# Patient Record
Sex: MALE | Race: OTHER | Employment: OTHER | URBAN - METROPOLITAN AREA
[De-identification: names, ages, dates, MRNs, and addresses within clinical notes are randomized per-mention and may not be internally consistent; named-entity substitution may affect disease eponyms.]

---

## 2017-06-12 ENCOUNTER — HOSPITAL ENCOUNTER (OUTPATIENT)
Age: 67
Discharge: HOME | End: 2017-06-12
Attending: PHYSICIAN ASSISTANT
Payer: MEDICARE

## 2017-06-12 DIAGNOSIS — M75.51 BURSITIS OF SHOULDER, RIGHT: ICD-10-CM

## 2017-06-12 PROCEDURE — 73221 MRI JOINT UPR EXTREM W/O DYE: CPT

## 2018-01-26 ENCOUNTER — HOSPITAL ENCOUNTER (OUTPATIENT)
Dept: PREADMISSION TESTING | Age: 68
Discharge: HOME | End: 2018-01-26
Payer: MEDICARE

## 2018-01-26 ENCOUNTER — HOSPITAL ENCOUNTER (OUTPATIENT)
Dept: GENERAL RADIOLOGY | Age: 68
Discharge: HOME | End: 2018-01-26
Payer: MEDICARE

## 2018-01-26 DIAGNOSIS — Z01.818 PRE-OP TESTING: ICD-10-CM

## 2018-01-26 DIAGNOSIS — Z01.811 PRE-OP CHEST EXAM: ICD-10-CM

## 2018-01-26 DIAGNOSIS — Z01.818 PRE-OP EXAMINATION: ICD-10-CM

## 2018-01-26 LAB
ABO + RH BLD: NORMAL
ALBUMIN SERPL-MCNC: 4.2 G/DL (ref 3.4–5)
ALBUMIN/GLOB SERPL: 1.4 {RATIO} (ref 0.8–1.7)
ALP SERPL-CCNC: 60 U/L (ref 45–117)
ALT SERPL-CCNC: 27 U/L (ref 16–61)
ANION GAP SERPL CALC-SCNC: 7 MMOL/L (ref 3–18)
APPEARANCE UR: CLEAR
APTT PPP: 26.3 SEC (ref 23–36.4)
AST SERPL-CCNC: 17 U/L (ref 15–37)
BASOPHILS # BLD: 0 K/UL (ref 0–0.1)
BASOPHILS NFR BLD: 1 % (ref 0–2)
BILIRUB SERPL-MCNC: 0.5 MG/DL (ref 0.2–1)
BILIRUB UR QL: NEGATIVE
BLOOD GROUP ANTIBODIES SERPL: NORMAL
BUN SERPL-MCNC: 19 MG/DL (ref 7–18)
BUN/CREAT SERPL: 22 (ref 12–20)
CALCIUM SERPL-MCNC: 9 MG/DL (ref 8.5–10.1)
CHLORIDE SERPL-SCNC: 102 MMOL/L (ref 100–108)
CO2 SERPL-SCNC: 29 MMOL/L (ref 21–32)
COLOR UR: YELLOW
CREAT SERPL-MCNC: 0.88 MG/DL (ref 0.6–1.3)
DIFFERENTIAL METHOD BLD: ABNORMAL
EOSINOPHIL # BLD: 0.1 K/UL (ref 0–0.4)
EOSINOPHIL NFR BLD: 2 % (ref 0–5)
ERYTHROCYTE [DISTWIDTH] IN BLOOD BY AUTOMATED COUNT: 13.2 % (ref 11.6–14.5)
GLOBULIN SER CALC-MCNC: 3 G/DL (ref 2–4)
GLUCOSE SERPL-MCNC: 94 MG/DL (ref 74–99)
GLUCOSE UR STRIP.AUTO-MCNC: NEGATIVE MG/DL
HCT VFR BLD AUTO: 40.6 % (ref 36–48)
HGB BLD-MCNC: 13.7 G/DL (ref 13–16)
HGB UR QL STRIP: NEGATIVE
INR PPP: 0.9 (ref 0.8–1.2)
KETONES UR QL STRIP.AUTO: NEGATIVE MG/DL
LEUKOCYTE ESTERASE UR QL STRIP.AUTO: NEGATIVE
LYMPHOCYTES # BLD: 1.1 K/UL (ref 0.9–3.6)
LYMPHOCYTES NFR BLD: 27 % (ref 21–52)
MCH RBC QN AUTO: 29.8 PG (ref 24–34)
MCHC RBC AUTO-ENTMCNC: 33.7 G/DL (ref 31–37)
MCV RBC AUTO: 88.5 FL (ref 74–97)
MONOCYTES # BLD: 0.3 K/UL (ref 0.05–1.2)
MONOCYTES NFR BLD: 8 % (ref 3–10)
NEUTS SEG # BLD: 2.6 K/UL (ref 1.8–8)
NEUTS SEG NFR BLD: 62 % (ref 40–73)
NITRITE UR QL STRIP.AUTO: NEGATIVE
PH UR STRIP: 7.5 [PH] (ref 5–8)
PLATELET # BLD AUTO: 178 K/UL (ref 135–420)
PMV BLD AUTO: 9.4 FL (ref 9.2–11.8)
POTASSIUM SERPL-SCNC: 4.3 MMOL/L (ref 3.5–5.5)
PROT SERPL-MCNC: 7.2 G/DL (ref 6.4–8.2)
PROT UR STRIP-MCNC: NEGATIVE MG/DL
PROTHROMBIN TIME: 12 SEC (ref 11.5–15.2)
RBC # BLD AUTO: 4.59 M/UL (ref 4.7–5.5)
SODIUM SERPL-SCNC: 138 MMOL/L (ref 136–145)
SP GR UR REFRACTOMETRY: 1.02 (ref 1–1.03)
SPECIMEN EXP DATE BLD: NORMAL
UROBILINOGEN UR QL STRIP.AUTO: 0.2 EU/DL (ref 0.2–1)
WBC # BLD AUTO: 4.2 K/UL (ref 4.6–13.2)

## 2018-01-26 PROCEDURE — 36415 COLL VENOUS BLD VENIPUNCTURE: CPT | Performed by: ORTHOPAEDIC SURGERY

## 2018-01-26 PROCEDURE — 93005 ELECTROCARDIOGRAM TRACING: CPT

## 2018-01-26 PROCEDURE — 87086 URINE CULTURE/COLONY COUNT: CPT | Performed by: ORTHOPAEDIC SURGERY

## 2018-01-26 PROCEDURE — 85610 PROTHROMBIN TIME: CPT | Performed by: ORTHOPAEDIC SURGERY

## 2018-01-26 PROCEDURE — 80053 COMPREHEN METABOLIC PANEL: CPT | Performed by: ORTHOPAEDIC SURGERY

## 2018-01-26 PROCEDURE — 85025 COMPLETE CBC W/AUTO DIFF WBC: CPT | Performed by: ORTHOPAEDIC SURGERY

## 2018-01-26 PROCEDURE — 86900 BLOOD TYPING SEROLOGIC ABO: CPT | Performed by: ORTHOPAEDIC SURGERY

## 2018-01-26 PROCEDURE — 81003 URINALYSIS AUTO W/O SCOPE: CPT | Performed by: ORTHOPAEDIC SURGERY

## 2018-01-26 PROCEDURE — 85730 THROMBOPLASTIN TIME PARTIAL: CPT | Performed by: ORTHOPAEDIC SURGERY

## 2018-01-26 PROCEDURE — 71046 X-RAY EXAM CHEST 2 VIEWS: CPT

## 2018-01-27 LAB
ATRIAL RATE: 63 BPM
BACTERIA SPEC CULT: NORMAL
CALCULATED P AXIS, ECG09: 70 DEGREES
CALCULATED R AXIS, ECG10: 21 DEGREES
CALCULATED T AXIS, ECG11: 23 DEGREES
DIAGNOSIS, 93000: NORMAL
P-R INTERVAL, ECG05: 138 MS
Q-T INTERVAL, ECG07: 382 MS
QRS DURATION, ECG06: 90 MS
QTC CALCULATION (BEZET), ECG08: 390 MS
SERVICE CMNT-IMP: NORMAL
VENTRICULAR RATE, ECG03: 63 BPM

## 2018-02-07 ENCOUNTER — ANESTHESIA EVENT (OUTPATIENT)
Dept: SURGERY | Age: 68
DRG: 483 | End: 2018-02-07
Payer: MEDICARE

## 2018-02-08 ENCOUNTER — HOSPITAL ENCOUNTER (INPATIENT)
Age: 68
LOS: 1 days | Discharge: HOME | DRG: 483 | End: 2018-02-09
Attending: ORTHOPAEDIC SURGERY | Admitting: ORTHOPAEDIC SURGERY
Payer: MEDICARE

## 2018-02-08 ENCOUNTER — ANESTHESIA (OUTPATIENT)
Dept: SURGERY | Age: 68
DRG: 483 | End: 2018-02-08
Payer: MEDICARE

## 2018-02-08 ENCOUNTER — APPOINTMENT (OUTPATIENT)
Dept: GENERAL RADIOLOGY | Age: 68
DRG: 483 | End: 2018-02-08
Attending: ORTHOPAEDIC SURGERY
Payer: MEDICARE

## 2018-02-08 DIAGNOSIS — M19.011 PRIMARY OSTEOARTHRITIS OF RIGHT SHOULDER: Primary | ICD-10-CM

## 2018-02-08 PROCEDURE — 74011000250 HC RX REV CODE- 250: Performed by: NURSE ANESTHETIST, CERTIFIED REGISTERED

## 2018-02-08 PROCEDURE — 77030021688 HC BIT DRL QC J&J -B: Performed by: ORTHOPAEDIC SURGERY

## 2018-02-08 PROCEDURE — 74011000258 HC RX REV CODE- 258

## 2018-02-08 PROCEDURE — 74011250636 HC RX REV CODE- 250/636: Performed by: NURSE ANESTHETIST, CERTIFIED REGISTERED

## 2018-02-08 PROCEDURE — 74011250636 HC RX REV CODE- 250/636: Performed by: ORTHOPAEDIC SURGERY

## 2018-02-08 PROCEDURE — 65270000029 HC RM PRIVATE

## 2018-02-08 PROCEDURE — C9290 INJ, BUPIVACAINE LIPOSOME: HCPCS | Performed by: ORTHOPAEDIC SURGERY

## 2018-02-08 PROCEDURE — 74011250637 HC RX REV CODE- 250/637: Performed by: ORTHOPAEDIC SURGERY

## 2018-02-08 PROCEDURE — 74011250636 HC RX REV CODE- 250/636

## 2018-02-08 PROCEDURE — 0RRJ0JZ REPLACEMENT OF RIGHT SHOULDER JOINT WITH SYNTHETIC SUBSTITUTE, OPEN APPROACH: ICD-10-PCS | Performed by: ORTHOPAEDIC SURGERY

## 2018-02-08 PROCEDURE — 77030018836 HC SOL IRR NACL ICUM -A: Performed by: ORTHOPAEDIC SURGERY

## 2018-02-08 PROCEDURE — 77030012547: Performed by: ORTHOPAEDIC SURGERY

## 2018-02-08 PROCEDURE — 74011250636 HC RX REV CODE- 250/636: Performed by: ANESTHESIOLOGY

## 2018-02-08 PROCEDURE — 77030020782 HC GWN BAIR PAWS FLX 3M -B: Performed by: ORTHOPAEDIC SURGERY

## 2018-02-08 PROCEDURE — 77030006576 HC BIT DRL QC J&J -C: Performed by: ORTHOPAEDIC SURGERY

## 2018-02-08 PROCEDURE — 77030027792 HC SMRTMX MIN KT J&J -B: Performed by: ORTHOPAEDIC SURGERY

## 2018-02-08 PROCEDURE — 3E0T3BZ INTRODUCTION OF ANESTHETIC AGENT INTO PERIPHERAL NERVES AND PLEXI, PERCUTANEOUS APPROACH: ICD-10-PCS | Performed by: ANESTHESIOLOGY

## 2018-02-08 PROCEDURE — 64415 NJX AA&/STRD BRCH PLXS IMG: CPT | Performed by: ANESTHESIOLOGY

## 2018-02-08 PROCEDURE — 77030019605: Performed by: ORTHOPAEDIC SURGERY

## 2018-02-08 PROCEDURE — 77030003029 HC SUT VCRL J&J -B: Performed by: ORTHOPAEDIC SURGERY

## 2018-02-08 PROCEDURE — 74011000258 HC RX REV CODE- 258: Performed by: ORTHOPAEDIC SURGERY

## 2018-02-08 PROCEDURE — 77030031410 HC IMMOB SHLDR SLNG SUP BREG -B: Performed by: ORTHOPAEDIC SURGERY

## 2018-02-08 PROCEDURE — 77030032490 HC SLV COMPR SCD KNE COVD -B: Performed by: ORTHOPAEDIC SURGERY

## 2018-02-08 PROCEDURE — 76210000006 HC OR PH I REC 0.5 TO 1 HR: Performed by: ORTHOPAEDIC SURGERY

## 2018-02-08 PROCEDURE — 74011000250 HC RX REV CODE- 250: Performed by: ORTHOPAEDIC SURGERY

## 2018-02-08 PROCEDURE — C1776 JOINT DEVICE (IMPLANTABLE): HCPCS | Performed by: ORTHOPAEDIC SURGERY

## 2018-02-08 PROCEDURE — 77030002966 HC SUT PDS J&J -A: Performed by: ORTHOPAEDIC SURGERY

## 2018-02-08 PROCEDURE — 77030002996 HC SUT SLK J&J -A: Performed by: ORTHOPAEDIC SURGERY

## 2018-02-08 PROCEDURE — 77030011640 HC PAD GRND REM COVD -A: Performed by: ORTHOPAEDIC SURGERY

## 2018-02-08 PROCEDURE — 77030002933 HC SUT MCRYL J&J -A: Performed by: ORTHOPAEDIC SURGERY

## 2018-02-08 PROCEDURE — 74011250637 HC RX REV CODE- 250/637: Performed by: NURSE ANESTHETIST, CERTIFIED REGISTERED

## 2018-02-08 PROCEDURE — 74011000250 HC RX REV CODE- 250

## 2018-02-08 PROCEDURE — 97161 PT EVAL LOW COMPLEX 20 MIN: CPT

## 2018-02-08 PROCEDURE — 77030003666 HC NDL SPINAL BD -A: Performed by: ORTHOPAEDIC SURGERY

## 2018-02-08 PROCEDURE — 76060000038 HC ANESTHESIA 3.5 TO 4 HR: Performed by: ORTHOPAEDIC SURGERY

## 2018-02-08 PROCEDURE — 76942 ECHO GUIDE FOR BIOPSY: CPT | Performed by: ANESTHESIOLOGY

## 2018-02-08 PROCEDURE — 77030013708 HC HNDPC SUC IRR PULS STRY –B: Performed by: ORTHOPAEDIC SURGERY

## 2018-02-08 PROCEDURE — 77030027138 HC INCENT SPIROMETER -A

## 2018-02-08 PROCEDURE — 76010000174 HC OR TIME 3.5 TO 4 HR INTENSV-TIER 1: Performed by: ORTHOPAEDIC SURGERY

## 2018-02-08 PROCEDURE — 73030 X-RAY EXAM OF SHOULDER: CPT

## 2018-02-08 PROCEDURE — 77030031139 HC SUT VCRL2 J&J -A: Performed by: ORTHOPAEDIC SURGERY

## 2018-02-08 PROCEDURE — 77030002922 HC SUT FBRWRE ARTH -B: Performed by: ORTHOPAEDIC SURGERY

## 2018-02-08 PROCEDURE — C1713 ANCHOR/SCREW BN/BN,TIS/BN: HCPCS | Performed by: ORTHOPAEDIC SURGERY

## 2018-02-08 PROCEDURE — 99218 HC RM OBSERVATION: CPT

## 2018-02-08 PROCEDURE — 77030011265 HC ELECTRD BLD HEX COVD -A: Performed by: ORTHOPAEDIC SURGERY

## 2018-02-08 PROCEDURE — 77030008467 HC STPLR SKN COVD -B: Performed by: ORTHOPAEDIC SURGERY

## 2018-02-08 PROCEDURE — 77030021370 HC WRP CLD THER ICMN DJOR -B: Performed by: ORTHOPAEDIC SURGERY

## 2018-02-08 DEVICE — IMPLANTABLE DEVICE: Type: IMPLANTABLE DEVICE | Site: SHOULDER | Status: FUNCTIONAL

## 2018-02-08 DEVICE — BODY HUM SZ 10 135DEG PROX SHLDR PORCOAT FOR ANAT PLATFRM: Type: IMPLANTABLE DEVICE | Site: SHOULDER | Status: FUNCTIONAL

## 2018-02-08 DEVICE — COMPONENT TOT SHLDR ARTHRPLSTY TOT: Type: IMPLANTABLE DEVICE | Site: SHOULDER | Status: FUNCTIONAL

## 2018-02-08 DEVICE — STEM HUM L113MM DIA10MM STD SHLDR PORCOAT FOR PLATFRM: Type: IMPLANTABLE DEVICE | Site: SHOULDER | Status: FUNCTIONAL

## 2018-02-08 DEVICE — CEMENT BNE GENTAMICIN 40 GM HI VISC SINGLE DOSE CMW 1: Type: IMPLANTABLE DEVICE | Site: SHOULDER | Status: FUNCTIONAL

## 2018-02-08 RX ORDER — ONDANSETRON 2 MG/ML
INJECTION INTRAMUSCULAR; INTRAVENOUS AS NEEDED
Status: DISCONTINUED | OUTPATIENT
Start: 2018-02-08 | End: 2018-02-08 | Stop reason: HOSPADM

## 2018-02-08 RX ORDER — OXYCODONE HYDROCHLORIDE 5 MG/1
5 TABLET ORAL
Status: DISCONTINUED | OUTPATIENT
Start: 2018-02-08 | End: 2018-02-09 | Stop reason: HOSPADM

## 2018-02-08 RX ORDER — ROPIVACAINE HYDROCHLORIDE 5 MG/ML
30 INJECTION, SOLUTION EPIDURAL; INFILTRATION; PERINEURAL
Status: DISCONTINUED | OUTPATIENT
Start: 2018-02-08 | End: 2018-02-08

## 2018-02-08 RX ORDER — OXYCODONE HYDROCHLORIDE 10 MG/1
10 TABLET ORAL
Status: DISCONTINUED | OUTPATIENT
Start: 2018-02-08 | End: 2018-02-09 | Stop reason: HOSPADM

## 2018-02-08 RX ORDER — LIDOCAINE HYDROCHLORIDE 10 MG/ML
0.1 INJECTION, SOLUTION EPIDURAL; INFILTRATION; INTRACAUDAL; PERINEURAL AS NEEDED
Status: DISCONTINUED | OUTPATIENT
Start: 2018-02-08 | End: 2018-02-08 | Stop reason: HOSPADM

## 2018-02-08 RX ORDER — ESMOLOL HYDROCHLORIDE 10 MG/ML
INJECTION INTRAVENOUS AS NEEDED
Status: DISCONTINUED | OUTPATIENT
Start: 2018-02-08 | End: 2018-02-08 | Stop reason: HOSPADM

## 2018-02-08 RX ORDER — ACETAMINOPHEN 325 MG/1
650 TABLET ORAL EVERY 6 HOURS
Status: DISCONTINUED | OUTPATIENT
Start: 2018-02-08 | End: 2018-02-09 | Stop reason: HOSPADM

## 2018-02-08 RX ORDER — EPHEDRINE SULFATE/0.9% NACL/PF 25 MG/5 ML
SYRINGE (ML) INTRAVENOUS AS NEEDED
Status: DISCONTINUED | OUTPATIENT
Start: 2018-02-08 | End: 2018-02-08 | Stop reason: HOSPADM

## 2018-02-08 RX ORDER — PROPOFOL 10 MG/ML
INJECTION, EMULSION INTRAVENOUS AS NEEDED
Status: DISCONTINUED | OUTPATIENT
Start: 2018-02-08 | End: 2018-02-08 | Stop reason: HOSPADM

## 2018-02-08 RX ORDER — DOCUSATE SODIUM 100 MG/1
100 CAPSULE, LIQUID FILLED ORAL 2 TIMES DAILY
Status: DISCONTINUED | OUTPATIENT
Start: 2018-02-08 | End: 2018-02-09 | Stop reason: HOSPADM

## 2018-02-08 RX ORDER — HYDROMORPHONE HYDROCHLORIDE 2 MG/ML
0.5 INJECTION, SOLUTION INTRAMUSCULAR; INTRAVENOUS; SUBCUTANEOUS
Status: DISCONTINUED | OUTPATIENT
Start: 2018-02-08 | End: 2018-02-08 | Stop reason: HOSPADM

## 2018-02-08 RX ORDER — SODIUM CHLORIDE 0.9 % (FLUSH) 0.9 %
5-10 SYRINGE (ML) INJECTION AS NEEDED
Status: DISCONTINUED | OUTPATIENT
Start: 2018-02-08 | End: 2018-02-08 | Stop reason: HOSPADM

## 2018-02-08 RX ORDER — FAMOTIDINE 20 MG/1
20 TABLET, FILM COATED ORAL ONCE
Status: COMPLETED | OUTPATIENT
Start: 2018-02-08 | End: 2018-02-08

## 2018-02-08 RX ORDER — ROSUVASTATIN CALCIUM 5 MG/1
10 TABLET, COATED ORAL
Status: DISCONTINUED | OUTPATIENT
Start: 2018-02-08 | End: 2018-02-09 | Stop reason: HOSPADM

## 2018-02-08 RX ORDER — KETOROLAC TROMETHAMINE 30 MG/ML
30 INJECTION, SOLUTION INTRAMUSCULAR; INTRAVENOUS
Status: DISCONTINUED | OUTPATIENT
Start: 2018-02-08 | End: 2018-02-08

## 2018-02-08 RX ORDER — CELECOXIB 100 MG/1
200 CAPSULE ORAL 2 TIMES DAILY
Status: DISCONTINUED | OUTPATIENT
Start: 2018-02-08 | End: 2018-02-09 | Stop reason: HOSPADM

## 2018-02-08 RX ORDER — NEOSTIGMINE METHYLSULFATE 5 MG/5 ML
SYRINGE (ML) INTRAVENOUS AS NEEDED
Status: DISCONTINUED | OUTPATIENT
Start: 2018-02-08 | End: 2018-02-08 | Stop reason: HOSPADM

## 2018-02-08 RX ORDER — KETOROLAC TROMETHAMINE 30 MG/ML
15 INJECTION, SOLUTION INTRAMUSCULAR; INTRAVENOUS
Status: COMPLETED | OUTPATIENT
Start: 2018-02-08 | End: 2018-02-08

## 2018-02-08 RX ORDER — ONDANSETRON 2 MG/ML
4 INJECTION INTRAMUSCULAR; INTRAVENOUS
Status: DISCONTINUED | OUTPATIENT
Start: 2018-02-08 | End: 2018-02-09 | Stop reason: HOSPADM

## 2018-02-08 RX ORDER — PHENYLEPHRINE HCL IN 0.9% NACL 1 MG/10 ML
SYRINGE (ML) INTRAVENOUS AS NEEDED
Status: DISCONTINUED | OUTPATIENT
Start: 2018-02-08 | End: 2018-02-08 | Stop reason: HOSPADM

## 2018-02-08 RX ORDER — FENTANYL CITRATE 50 UG/ML
100 INJECTION, SOLUTION INTRAMUSCULAR; INTRAVENOUS
Status: DISCONTINUED | OUTPATIENT
Start: 2018-02-08 | End: 2018-02-08 | Stop reason: HOSPADM

## 2018-02-08 RX ORDER — ROPIVACAINE HYDROCHLORIDE 2 MG/ML
30 INJECTION, SOLUTION EPIDURAL; INFILTRATION; PERINEURAL
Status: COMPLETED | OUTPATIENT
Start: 2018-02-08 | End: 2018-02-08

## 2018-02-08 RX ORDER — SUCCINYLCHOLINE CHLORIDE 20 MG/ML
INJECTION INTRAMUSCULAR; INTRAVENOUS AS NEEDED
Status: DISCONTINUED | OUTPATIENT
Start: 2018-02-08 | End: 2018-02-08 | Stop reason: HOSPADM

## 2018-02-08 RX ORDER — MELATONIN
1000 DAILY
Status: DISCONTINUED | OUTPATIENT
Start: 2018-02-09 | End: 2018-02-09 | Stop reason: HOSPADM

## 2018-02-08 RX ORDER — SODIUM CHLORIDE, SODIUM LACTATE, POTASSIUM CHLORIDE, CALCIUM CHLORIDE 600; 310; 30; 20 MG/100ML; MG/100ML; MG/100ML; MG/100ML
50 INJECTION, SOLUTION INTRAVENOUS CONTINUOUS
Status: DISCONTINUED | OUTPATIENT
Start: 2018-02-08 | End: 2018-02-08 | Stop reason: HOSPADM

## 2018-02-08 RX ORDER — MIDAZOLAM HYDROCHLORIDE 1 MG/ML
2 INJECTION, SOLUTION INTRAMUSCULAR; INTRAVENOUS
Status: DISCONTINUED | OUTPATIENT
Start: 2018-02-08 | End: 2018-02-08 | Stop reason: HOSPADM

## 2018-02-08 RX ORDER — FENTANYL CITRATE 50 UG/ML
INJECTION, SOLUTION INTRAMUSCULAR; INTRAVENOUS AS NEEDED
Status: DISCONTINUED | OUTPATIENT
Start: 2018-02-08 | End: 2018-02-08 | Stop reason: HOSPADM

## 2018-02-08 RX ORDER — ROCURONIUM BROMIDE 10 MG/ML
INJECTION, SOLUTION INTRAVENOUS AS NEEDED
Status: DISCONTINUED | OUTPATIENT
Start: 2018-02-08 | End: 2018-02-08 | Stop reason: HOSPADM

## 2018-02-08 RX ORDER — DEXAMETHASONE SODIUM PHOSPHATE 4 MG/ML
INJECTION, SOLUTION INTRA-ARTICULAR; INTRALESIONAL; INTRAMUSCULAR; INTRAVENOUS; SOFT TISSUE AS NEEDED
Status: DISCONTINUED | OUTPATIENT
Start: 2018-02-08 | End: 2018-02-08 | Stop reason: HOSPADM

## 2018-02-08 RX ORDER — ONDANSETRON 2 MG/ML
4 INJECTION INTRAMUSCULAR; INTRAVENOUS ONCE
Status: DISCONTINUED | OUTPATIENT
Start: 2018-02-08 | End: 2018-02-08 | Stop reason: HOSPADM

## 2018-02-08 RX ORDER — CEFAZOLIN SODIUM 2 G/50ML
2 SOLUTION INTRAVENOUS ONCE
Status: COMPLETED | OUTPATIENT
Start: 2018-02-08 | End: 2018-02-08

## 2018-02-08 RX ORDER — SODIUM CHLORIDE 0.9 % (FLUSH) 0.9 %
5-10 SYRINGE (ML) INJECTION EVERY 8 HOURS
Status: DISCONTINUED | OUTPATIENT
Start: 2018-02-08 | End: 2018-02-08 | Stop reason: HOSPADM

## 2018-02-08 RX ORDER — OXYCODONE HYDROCHLORIDE 15 MG/1
15 TABLET ORAL
Status: DISCONTINUED | OUTPATIENT
Start: 2018-02-08 | End: 2018-02-09 | Stop reason: HOSPADM

## 2018-02-08 RX ORDER — LIDOCAINE HYDROCHLORIDE 20 MG/ML
INJECTION, SOLUTION EPIDURAL; INFILTRATION; INTRACAUDAL; PERINEURAL AS NEEDED
Status: DISCONTINUED | OUTPATIENT
Start: 2018-02-08 | End: 2018-02-08 | Stop reason: HOSPADM

## 2018-02-08 RX ORDER — NALOXONE HYDROCHLORIDE 0.4 MG/ML
0.4 INJECTION, SOLUTION INTRAMUSCULAR; INTRAVENOUS; SUBCUTANEOUS AS NEEDED
Status: DISCONTINUED | OUTPATIENT
Start: 2018-02-08 | End: 2018-02-09 | Stop reason: HOSPADM

## 2018-02-08 RX ORDER — SODIUM CHLORIDE 0.9 % (FLUSH) 0.9 %
5-10 SYRINGE (ML) INJECTION EVERY 8 HOURS
Status: DISCONTINUED | OUTPATIENT
Start: 2018-02-08 | End: 2018-02-09 | Stop reason: HOSPADM

## 2018-02-08 RX ORDER — SODIUM CHLORIDE 0.9 % (FLUSH) 0.9 %
5-10 SYRINGE (ML) INJECTION AS NEEDED
Status: DISCONTINUED | OUTPATIENT
Start: 2018-02-08 | End: 2018-02-09 | Stop reason: HOSPADM

## 2018-02-08 RX ORDER — BENAZEPRIL HYDROCHLORIDE 10 MG/1
5 TABLET ORAL DAILY
Status: DISCONTINUED | OUTPATIENT
Start: 2018-02-09 | End: 2018-02-09 | Stop reason: HOSPADM

## 2018-02-08 RX ORDER — GLYCOPYRROLATE 0.2 MG/ML
INJECTION INTRAMUSCULAR; INTRAVENOUS AS NEEDED
Status: DISCONTINUED | OUTPATIENT
Start: 2018-02-08 | End: 2018-02-08 | Stop reason: HOSPADM

## 2018-02-08 RX ORDER — HEPARIN SODIUM 5000 [USP'U]/ML
5000 INJECTION, SOLUTION INTRAVENOUS; SUBCUTANEOUS EVERY 8 HOURS
Status: DISCONTINUED | OUTPATIENT
Start: 2018-02-08 | End: 2018-02-09 | Stop reason: HOSPADM

## 2018-02-08 RX ORDER — PROMETHAZINE HYDROCHLORIDE 25 MG/ML
12.5 INJECTION, SOLUTION INTRAMUSCULAR; INTRAVENOUS
Status: DISCONTINUED | OUTPATIENT
Start: 2018-02-08 | End: 2018-02-08 | Stop reason: HOSPADM

## 2018-02-08 RX ORDER — CEFAZOLIN SODIUM 2 G/50ML
2 SOLUTION INTRAVENOUS EVERY 8 HOURS
Status: COMPLETED | OUTPATIENT
Start: 2018-02-08 | End: 2018-02-09

## 2018-02-08 RX ADMIN — GLYCOPYRROLATE 0.8 MG: 0.2 INJECTION INTRAMUSCULAR; INTRAVENOUS at 11:01

## 2018-02-08 RX ADMIN — OXYCODONE HYDROCHLORIDE 10 MG: 10 TABLET ORAL at 23:32

## 2018-02-08 RX ADMIN — CEFAZOLIN SODIUM 2 G: 2 SOLUTION INTRAVENOUS at 23:34

## 2018-02-08 RX ADMIN — HEPARIN SODIUM 5000 UNITS: 5000 INJECTION, SOLUTION INTRAVENOUS; SUBCUTANEOUS at 23:33

## 2018-02-08 RX ADMIN — ROCURONIUM BROMIDE 20 MG: 10 INJECTION, SOLUTION INTRAVENOUS at 08:50

## 2018-02-08 RX ADMIN — LIDOCAINE HYDROCHLORIDE 100 MG: 20 INJECTION, SOLUTION EPIDURAL; INFILTRATION; INTRACAUDAL; PERINEURAL at 07:36

## 2018-02-08 RX ADMIN — ESMOLOL HYDROCHLORIDE 20 MG: 10 INJECTION INTRAVENOUS at 11:06

## 2018-02-08 RX ADMIN — FENTANYL CITRATE 50 MCG: 50 INJECTION, SOLUTION INTRAMUSCULAR; INTRAVENOUS at 08:50

## 2018-02-08 RX ADMIN — MIDAZOLAM HYDROCHLORIDE 1 MG: 1 INJECTION, SOLUTION INTRAMUSCULAR; INTRAVENOUS at 07:09

## 2018-02-08 RX ADMIN — ACETAMINOPHEN 650 MG: 325 TABLET, FILM COATED ORAL at 13:34

## 2018-02-08 RX ADMIN — Medication 100 MCG: at 10:53

## 2018-02-08 RX ADMIN — TRANEXAMIC ACID 1 G: 100 INJECTION, SOLUTION INTRAVENOUS at 07:40

## 2018-02-08 RX ADMIN — ROCURONIUM BROMIDE 10 MG: 10 INJECTION, SOLUTION INTRAVENOUS at 10:25

## 2018-02-08 RX ADMIN — Medication 200 MCG: at 07:44

## 2018-02-08 RX ADMIN — CEFAZOLIN SODIUM 2 G: 2 SOLUTION INTRAVENOUS at 07:28

## 2018-02-08 RX ADMIN — SODIUM CHLORIDE, SODIUM LACTATE, POTASSIUM CHLORIDE, AND CALCIUM CHLORIDE: 600; 310; 30; 20 INJECTION, SOLUTION INTRAVENOUS at 09:46

## 2018-02-08 RX ADMIN — DOCUSATE SODIUM 100 MG: 100 CAPSULE, LIQUID FILLED ORAL at 13:34

## 2018-02-08 RX ADMIN — SUCCINYLCHOLINE CHLORIDE 100 MG: 20 INJECTION INTRAMUSCULAR; INTRAVENOUS at 07:37

## 2018-02-08 RX ADMIN — ROCURONIUM BROMIDE 50 MG: 10 INJECTION, SOLUTION INTRAVENOUS at 07:45

## 2018-02-08 RX ADMIN — ACETAMINOPHEN 650 MG: 325 TABLET, FILM COATED ORAL at 23:33

## 2018-02-08 RX ADMIN — Medication 100 MCG: at 10:48

## 2018-02-08 RX ADMIN — Medication 5 MG: at 09:07

## 2018-02-08 RX ADMIN — ROSUVASTATIN CALCIUM 10 MG: 5 TABLET ORAL at 23:33

## 2018-02-08 RX ADMIN — FENTANYL CITRATE 50 MCG: 50 INJECTION, SOLUTION INTRAMUSCULAR; INTRAVENOUS at 07:36

## 2018-02-08 RX ADMIN — ROCURONIUM BROMIDE 10 MG: 10 INJECTION, SOLUTION INTRAVENOUS at 09:41

## 2018-02-08 RX ADMIN — HEPARIN SODIUM 5000 UNITS: 5000 INJECTION, SOLUTION INTRAVENOUS; SUBCUTANEOUS at 13:34

## 2018-02-08 RX ADMIN — Medication 5 MG: at 08:00

## 2018-02-08 RX ADMIN — LIDOCAINE HYDROCHLORIDE 0.1 ML: 10 INJECTION, SOLUTION EPIDURAL; INFILTRATION; INTRACAUDAL; PERINEURAL at 07:11

## 2018-02-08 RX ADMIN — ROPIVACAINE HYDROCHLORIDE 40 MG: 2 INJECTION, SOLUTION EPIDURAL; INFILTRATION at 07:13

## 2018-02-08 RX ADMIN — KETOROLAC TROMETHAMINE 15 MG: 30 INJECTION, SOLUTION INTRAMUSCULAR at 11:52

## 2018-02-08 RX ADMIN — FAMOTIDINE 20 MG: 20 TABLET, FILM COATED ORAL at 06:21

## 2018-02-08 RX ADMIN — Medication 10 ML: at 13:35

## 2018-02-08 RX ADMIN — FENTANYL CITRATE 50 MCG: 50 INJECTION, SOLUTION INTRAMUSCULAR; INTRAVENOUS at 08:13

## 2018-02-08 RX ADMIN — Medication 10 MG: at 07:44

## 2018-02-08 RX ADMIN — DEXAMETHASONE SODIUM PHOSPHATE 4 MG: 4 INJECTION, SOLUTION INTRA-ARTICULAR; INTRALESIONAL; INTRAMUSCULAR; INTRAVENOUS; SOFT TISSUE at 07:42

## 2018-02-08 RX ADMIN — FENTANYL CITRATE 50 MCG: 50 INJECTION, SOLUTION INTRAMUSCULAR; INTRAVENOUS at 10:12

## 2018-02-08 RX ADMIN — FENTANYL CITRATE 50 MCG: 50 INJECTION, SOLUTION INTRAMUSCULAR; INTRAVENOUS at 09:47

## 2018-02-08 RX ADMIN — SODIUM CHLORIDE, SODIUM LACTATE, POTASSIUM CHLORIDE, AND CALCIUM CHLORIDE 50 ML/HR: 600; 310; 30; 20 INJECTION, SOLUTION INTRAVENOUS at 06:12

## 2018-02-08 RX ADMIN — Medication 4 MG: at 11:01

## 2018-02-08 RX ADMIN — CELECOXIB 200 MG: 100 CAPSULE ORAL at 13:34

## 2018-02-08 RX ADMIN — PROPOFOL 150 MG: 10 INJECTION, EMULSION INTRAVENOUS at 07:36

## 2018-02-08 RX ADMIN — CEFAZOLIN SODIUM 2 G: 2 SOLUTION INTRAVENOUS at 15:00

## 2018-02-08 RX ADMIN — Medication 10 ML: at 23:41

## 2018-02-08 RX ADMIN — TRANEXAMIC ACID 1 G: 100 INJECTION, SOLUTION INTRAVENOUS at 10:45

## 2018-02-08 RX ADMIN — Medication 100 MCG: at 09:07

## 2018-02-08 RX ADMIN — Medication 100 MCG: at 08:00

## 2018-02-08 RX ADMIN — ONDANSETRON 4 MG: 2 INJECTION INTRAMUSCULAR; INTRAVENOUS at 07:42

## 2018-02-08 RX ADMIN — FENTANYL CITRATE 50 MCG: 50 INJECTION INTRAMUSCULAR; INTRAVENOUS at 07:09

## 2018-02-08 NOTE — PROGRESS NOTES
conducted a pre-surgery visit with Shajiamrita Marin, who is a 77 y.o.,male. The  provided the following Interventions:  Initiated a relationship of care and support. Plan:  Chaplains will continue to follow and will provide pastoral care on an as needed/requested basis.  recommends bedside caregivers page  on duty if patient shows signs of acute spiritual or emotional distress.     [address] Certified [address] [place] [address]   [phone]

## 2018-02-08 NOTE — IP AVS SNAPSHOT
[facility] 
 
 
 [address] Patient: Chadwick Benson MRN: [id number] MJP:[birth year] About your hospitalization You were admitted on:  February 8, 2018 You last received care in the:  [facility] [facility] You were discharged on:  February 9, 2018 Why you were hospitalized Your primary diagnosis was:  Not on File Your diagnoses also included:  Osteoarthritis Of Right Shoulder Follow-up Information Follow up With Details Comments Contact Info Eusebio Olivares MD On 2/14/2018 Appointment at 11:30 am [address] [facility] [address] [facility] [id number] 
[phone] Andressa Zavaleta MD On 2/21/2018 Appointment at 10:25 am 1600 [facility] 150 200 [facility] 
[phone] Discharge Orders None A check adenike indicates which time of day the medication should be taken. My Medications START taking these medications Instructions Each Dose to Equal  
 Morning Noon Evening Bedtime  
 acetaminophen 325 mg tablet Commonly known as:  TYLENOL Your last dose was: Your next dose is: Take 2 Tabs by mouth every six (6) hours as needed for Pain. 650 mg  
    
   
   
   
  
 celecoxib 200 mg capsule Commonly known as:  CELEBREX Your last dose was: Your next dose is: Take 1 Cap by mouth daily for 90 days. Indications: Postoperative Acute Pain 200 mg  
    
   
   
   
  
 docusate sodium 100 mg capsule Commonly known as:  Becky Pitts Your last dose was: Your next dose is: Take 1 Cap by mouth two (2) times a day for 90 days. 100 mg  
    
   
   
   
  
 oxyCODONE IR 5 mg immediate release tablet Commonly known as:  Marguerite De La Garza Your last dose was: Your next dose is:    
   
   
 Okay to take 1 to 3 tabs every 4 to 6 hours as needed for pain CONTINUE taking these medications Instructions Each Dose to Equal  
 Morning Noon Evening Bedtime CALCIUM PO Your last dose was: Your next dose is: Take  by mouth daily. CINNAMON PO Your last dose was: Your next dose is: Take  by mouth two (2) times a day. COQ10  100-100 mg-unit Cap Generic drug:  coenzyme q10-vitamin e Your last dose was: Your next dose is: Take  by mouth. CRESTOR 10 mg tablet Generic drug:  rosuvastatin Your last dose was: Your next dose is: Take 10 mg by mouth nightly. 10 mg FISH OIL 1,000 mg Cap Generic drug:  omega-3 fatty acids-vitamin e Your last dose was: Your next dose is: Take 1 Cap by mouth. 1 Cap  
    
   
   
   
  
 garlic Cap Your last dose was: Your next dose is: Take  by mouth. LOTENSIN 5 mg tablet Generic drug:  benazepril Your last dose was: Your next dose is: Take 5 mg by mouth daily. 5 mg  
    
   
   
   
  
 lutein 40 mg Cap Your last dose was: Your next dose is: Take  by mouth. lycopene 10 mg Cap Your last dose was: Your next dose is: Take  by mouth. MULTIVITAMIN WITH MINERALS PO Your last dose was: Your next dose is: Take  by mouth. saw palmetto 320 mg Cap Your last dose was: Your next dose is: Take  by mouth. VITAMIN D3 1,000 unit tablet Generic drug:  cholecalciferol Your last dose was: Your next dose is: Take  by mouth daily. STOP taking these medications   
 aspirin 81 mg tablet Where to Get Your Medications Information on where to get these meds will be given to you by the nurse or doctor. ! Ask your nurse or doctor about these medications  
  acetaminophen 325 mg tablet  
 celecoxib 200 mg capsule  
 docusate sodium 100 mg capsule  
 oxyCODONE IR 5 mg immediate release tablet Discharge Instructions DISCHARGE SUMMARY from Nurse PATIENT INSTRUCTIONS: 
 
After general anesthesia or intravenous sedation, for 24 hours or while taking prescription Narcotics: · Limit your activities · Do not drive and operate hazardous machinery · Do not make important personal or business decisions · Do  not drink alcoholic beverages · If you have not urinated within 8 hours after discharge, please contact your surgeon on call. Report the following to your surgeon: 
· Excessive pain, swelling, redness or odor of or around the surgical area · Temperature over 100.5 · Nausea and vomiting lasting longer than 4 hours or if unable to take medications · Any signs of decreased circulation or nerve impairment to extremity: change in color, persistent  numbness, tingling, coldness or increase pain · Any questions What to do at Home: 
Recommended activity: PT/OT per Home Health,  
 
If you experience any of the following symptoms , please follow up with . *  Please give a list of your current medications to your Primary Care Provider. *  Please update this list whenever your medications are discontinued, doses are 
    changed, or new medications (including over-the-counter products) are added. *  Please carry medication information at all times in case of emergency situations. These are general instructions for a healthy lifestyle: No smoking/ No tobacco products/ Avoid exposure to second hand smoke Surgeon General's Warning:  Quitting smoking now greatly reduces serious risk to your health. Obesity, smoking, and sedentary lifestyle greatly increases your risk for illness A healthy diet, regular physical exercise & weight monitoring are important for maintaining a healthy lifestyle You may be retaining fluid if you have a history of heart failure or if you experience any of the following symptoms:  Weight gain of 3 pounds or more overnight or 5 pounds in a week, increased swelling in our hands or feet or shortness of breath while lying flat in bed. Please call your doctor as soon as you notice any of these symptoms; do not wait until your next office visit. Recognize signs and symptoms of STROKE: 
 
F-face looks uneven A-arms unable to move or move unevenly S-speech slurred or non-existent T-time-call 911 as soon as signs and symptoms begin-DO NOT go Back to bed or wait to see if you get better-TIME IS BRAIN. Warning Signs of HEART ATTACK Call 911 if you have these symptoms: 
? Chest discomfort. Most heart attacks involve discomfort in the center of the chest that lasts more than a few minutes, or that goes away and comes back. It can feel like uncomfortable pressure, squeezing, fullness, or pain. ? Discomfort in other areas of the upper body. Symptoms can include pain or discomfort in one or both arms, the back, neck, jaw, or stomach. ? Shortness of breath with or without chest discomfort. ? Other signs may include breaking out in a cold sweat, nausea, or lightheadedness. Don't wait more than five minutes to call [address]! Fast action can save your life. Calling 911 is almost always the fastest way to get lifesaving treatment. Emergency Medical Services staff can begin treatment when they arrive  up to an hour sooner than if someone gets to the hospital by car. The discharge information has been reviewed with the patient. The patient verbalized understanding.  
Discharge medications reviewed with the patient and appropriate educational materials and side effects teaching were provided. ___________________________________________________________________________________________________________________________________ Patient Discharge Instructions Lamonte Mohr / [id number] : 1950 Admitted 2018 Discharged: 2018 Take Home Medications · It is important that you take the medication exactly as they are prescribed. · Keep your medication in the bottles provided by the pharmacist and keep a list of the medication names, dosages, and times to be taken in your wallet. · Do not take other medications without consulting your doctor. What to do at [facility] Recommended diet[de-identified] resume home diet. See printed instructions for further information. Call office for any additional follow up instructions [phone] Information obtained by : 
I understand that if any problems occur once I am at home I am to contact my physician. I understand and acknowledge receipt of the instructions indicated above. Physician's or R.N.'s Signature                                                                  Date/Time Patient or Representative Signature                                                          Date/Time MyChart Announcement We are excited to announce that we are making your provider's discharge notes available to you in [facility]. You will see these notes when they are completed and signed by the physician that discharged you from your recent hospital stay.   If you have any questions or concerns about any information you see in [facility], please call the [facility] Information Department where you were seen or reach out to your Primary Care Provider for more information about your plan of care. Providers Seen During Your Hospitalization Provider Specialty Primary office phone Pily Thompson MD Orthopedic Surgery [phone] Your Primary Care Physician (PCP) Primary Care Physician Office Phone Office Fax Marisol Crespo [phone] You are allergic to the following No active allergies Recent Documentation Height Weight BMI Smoking Status 1.727 m 81.8 kg 27.41 kg/m2 Never Smoker Emergency Contacts Name Discharge Info Relation Home Work Mobile Jaime Alexander N/A  AT THIS TIME [6] Brother [24] [phone] Patient Belongings The following personal items are in your possession at time of discharge: 
  Dental Appliances: None         Home Medications: None   Jewelry: None  Clothing: Pants, Shirt, Undergarments, Socks, Footwear, Jacket/Coat    Other Valuables: Money (comment) (insurance card, 10.00, medicare card )  Personal Items Sent to Safe: pre op locker Please provide this summary of care documentation to your next provider. Signatures-by signing, you are acknowledging that this After Visit Summary has been reviewed with you and you have received a copy. Patient Signature:  ____________________________________________________________ Date:  ____________________________________________________________  
  
[facility] November Provider Signature:  ____________________________________________________________ Date:  ____________________________________________________________

## 2018-02-08 NOTE — IP AVS SNAPSHOT
Yany [facility] 
 
 
 [address] Patient: Landon Rainey MRN: [id number] [facility]:7/2/5168 A check adenike indicates which time of day the medication should be taken. My Medications START taking these medications Instructions Each Dose to Equal  
 Morning Noon Evening Bedtime  
 acetaminophen 325 mg tablet Commonly known as:  TYLENOL Your last dose was: Your next dose is: Take 2 Tabs by mouth every six (6) hours as needed for Pain. 650 mg  
    
   
   
   
  
 celecoxib 200 mg capsule Commonly known as:  CELEBREX Your last dose was: Your next dose is: Take 1 Cap by mouth daily for 90 days. Indications: Postoperative Acute Pain 200 mg  
    
   
   
   
  
 docusate sodium 100 mg capsule Commonly known as:  Monteemile Pino Your last dose was: Your next dose is: Take 1 Cap by mouth two (2) times a day for 90 days. 100 mg  
    
   
   
   
  
 oxyCODONE IR 5 mg immediate release tablet Commonly known as:  Tristan Douglass Your last dose was: Your next dose is:    
   
   
 Okay to take 1 to 3 tabs every 4 to 6 hours as needed for pain CONTINUE taking these medications Instructions Each Dose to Equal  
 Morning Noon Evening Bedtime CALCIUM PO Your last dose was: Your next dose is: Take  by mouth daily. CINNAMON PO Your last dose was: Your next dose is: Take  by mouth two (2) times a day. COQ10  100-100 mg-unit Cap Generic drug:  coenzyme q10-vitamin e Your last dose was: Your next dose is: Take  by mouth. CRESTOR 10 mg tablet Generic drug:  rosuvastatin Your last dose was: Your next dose is: Take 10 mg by mouth nightly. 10 mg FISH OIL 1,000 mg Cap Generic drug:  omega-3 fatty acids-vitamin e Your last dose was: Your next dose is: Take 1 Cap by mouth. 1 Cap  
    
   
   
   
  
 garlic Cap Your last dose was: Your next dose is: Take  by mouth. LOTENSIN 5 mg tablet Generic drug:  benazepril Your last dose was: Your next dose is: Take 5 mg by mouth daily. 5 mg  
    
   
   
   
  
 lutein 40 mg Cap Your last dose was: Your next dose is: Take  by mouth. lycopene 10 mg Cap Your last dose was: Your next dose is: Take  by mouth. MULTIVITAMIN WITH MINERALS PO Your last dose was: Your next dose is: Take  by mouth. saw palmetto 320 mg Cap Your last dose was: Your next dose is: Take  by mouth. VITAMIN D3 1,000 unit tablet Generic drug:  cholecalciferol Your last dose was: Your next dose is: Take  by mouth daily. STOP taking these medications   
 aspirin 81 mg tablet Where to Get Your Medications Information on where to get these meds will be given to you by the nurse or doctor. ! Ask your nurse or doctor about these medications  
  acetaminophen 325 mg tablet  
 celecoxib 200 mg capsule  
 docusate sodium 100 mg capsule  
 oxyCODONE IR 5 mg immediate release tablet

## 2018-02-08 NOTE — ANESTHESIA PROCEDURE NOTES
Peripheral Block    Start time: 2/8/2018 7:05 AM  End time: 2/8/2018 7:20 AM  Performed by: Chandler Eid  Authorized by: Chandler Eid       Pre-procedure: Indications: at surgeon's request and post-op pain management    Preanesthetic Checklist: patient identified, risks and benefits discussed, site marked, timeout performed, anesthesia consent given and patient being monitored    Timeout Time: 07:08          Block Type:   Block Type:   Interscalene  Laterality:  Right  Monitoring:  Standard ASA monitoring, continuous pulse ox, frequent vital sign checks, heart rate, responsive to questions and oxygen  Injection Technique:  Single shot  Procedures: ultrasound guided and nerve stimulator    Patient Position: seated  Prep: chlorhexidine    Location:  Interscalene  Needle Type:  Stimuplex  Needle Gauge:  21 G  Needle Localization:  Ultrasound guidance and nerve stimulator  Motor Response: minimal motor response >0.4 mA    Medication Injected:  0.2%  ropivacaine  Volume (mL):  20  Add'l Medication Injected:  1.0%  lidocaine    Assessment:  Number of attempts:  1  Injection Assessment:  Incremental injection every 5 mL, negative aspiration for CSF, no paresthesia, ultrasound image on chart, local visualized surrounding nerve on ultrasound, negative aspiration for blood and no intravascular symptoms  Patient tolerance:  Patient tolerated the procedure well with no immediate complications

## 2018-02-08 NOTE — PROGRESS NOTES
Rounded on patient post shoulder surgery. Activity: Reinforced importance of getting OOB for all meals, going to bathroom to help prevent blood clots. VTE prophylaxis: Instructed patient to use their SCD's when not up and walking. To use while in bed and in the chair. Educated re: ankle pumps to assist with circulation when in hospital and at home. Medications: Reviewed pain medications patient is taking and the importance of keeping pain under control to help with getting OOB and therapy. Reminded the patient to always eat a snack with their pain medication to help to prevent nausea. Encouraged patient to monitor for constipation and to take a stool softner/laxative while recovering and on pain medication. Incentive Spirometry: Reinforced use of incentive spirometer with return demonstration by patient. Wound Care: Dressing to surgical site dry  . Patient instructed not to take dressing off at home. Patient given CHG wash to use in hospital and at home. Stressed importance of using a clean towel and washcloth daily. Reminded to put on clean clothes and night clothes daily. Ice Protocol: Ice machine in place per protocol. Patient Safety: Call light in reach. Patient  reminded to call for help toget OOB or when leaving bathroom for safety. Phone and other items also within reach per patient's request.     Diet: Educated patient on the importance of eating three well balanced meals a day with protein to promote bone/muscle healing. Reminded patient to drink lots of fluids to protect kidneys from all the medications being taken currently with recovery. Patient  verbalized understand of all information and education discussed. Patient  given the opportunity for asking questions.

## 2018-02-08 NOTE — H&P
1575 [facility] Ortho Admission H&P  A complete history and physical was performed within the last 7 days and is contained in the paper chart. The patient was seen and examined and no changes made. Will proceed with right total shoulder arthroplasty. Payal Hernandez MD

## 2018-02-08 NOTE — BRIEF OP NOTE
BRIEF OPERATIVE NOTE    Date of Procedure: 2/8/2018   Preoperative Diagnosis: Osteoarthritis of right shoulder, unspecified osteoarthritis type [M19.011]  Postoperative Diagnosis: Osteoarthritis of right shoulder, unspecified osteoarthritis type [M19.011]    Procedure(s):  RIGHT TOTAL SHOULDER REPLACEMENT/DEPUY/T-MAX/SPIDER/2 SA'S/NERVE BLOCK  Surgeon(s) and Role:     * Mike Jackson MD - Primary         Assistant Staff: None      Surgical Staff:  Circ-1: Romayne Reas, RN  Circ-Relief: Rowena Hernandez RN  Scrub Tech-1: Chadwick Humbles  Surg Asst-1: Charlie Sprawls  Surg Asst-2: Shelley Javier  Event Time In   Incision Start 0815   Incision Close 1115     Anesthesia: General   Estimated Blood Loss: 150cc  Specimens: * No specimens in log *   Findings: Advanced glenohumeral osteoarthritis  Complications: None  Implants:   Implant Name Type Inv. Item Serial No.  Lot No. LRB No. Used Action   ANCHOR CROSSLINK PG GLENOID 44 --  - [id number]  ANCHOR CROSSLINK PG GLENOID 44 --   [employer] [id number] Right 1 Implanted   CEMENT BNE ENDUR 40GM --  - [id number]  CEMENT BNE ENDUR 40GM --   [employer] [id number] Right 1 Implanted   HEAD HUM ECC [id number] -- GLOBAL UNITE - [id number]  HEAD HUM ECC [id number] -- GLOBAL UNITE  [employer] [id number] Right 1 Implanted   STEM HUM POROUS STD SZ 10 -- GLOBAL UNITE - [id number]  STEM HUM POROUS STD SZ 10 -- GLOBAL UNITE  [employer] [id number] Right 1 Implanted   BODY PROX HUM MOD 135D SZ 10 -- GLOBAL UNITE - [id number]   BODY PROX HUM MOD 135D SZ 10 -- GLOBAL UNITE   [employer] [id number] Right 1 Implanted       Payal Steinberg MD

## 2018-02-08 NOTE — ANESTHESIA PREPROCEDURE EVALUATION
Anesthetic History   No history of anesthetic complications            Review of Systems / Medical History  Patient summary reviewed and pertinent labs reviewed    Pulmonary          Undiagnosed apnea         Neuro/Psych   Within defined limits           Cardiovascular    Hypertension          Hyperlipidemia         GI/Hepatic/Renal                Endo/Other        Obesity and arthritis     Other Findings   Comments: Documentation of current medication  Current medications obtained, documented and obtained? YES      Risk Factors for Postoperative nausea/vomiting:       History of postoperative nausea/vomiting? NO       Female? NO       Motion sickness? NO       Intended opioid administration for postoperative analgesia? YES      Smoking Abstinence:  Current Smoker? NO  Elective Surgery? YES  Seen preoperatively by anesthesiologist or proxy prior to day of surgery? YES  Pt abstained from smoking 24 hours prior to anesthesia?  N/A    Preventive care/screening for High Blood Pressure:  Aged 18 years and older: YES  Screened for high blood pressure: YES  Patients with high blood pressure referred to primary care provider   for BP management: YES                 Physical Exam    Airway  Mallampati: III  TM Distance: 4 - 6 cm  Neck ROM: normal range of motion, short neck   Mouth opening: Diminished (comment)     Cardiovascular    Rhythm: irregular  Rate: normal         Dental    Dentition: Poor dentition and Implants     Pulmonary  Breath sounds clear to auscultation               Abdominal  GI exam deferred       Other Findings            Anesthetic Plan    ASA: 3  Anesthesia type: general      Post-op pain plan if not by surgeon: peripheral nerve block single    Induction: Intravenous  Anesthetic plan and risks discussed with: Patient

## 2018-02-08 NOTE — IP AVS SNAPSHOT
Summary of Care Report The Summary of Care report has been created to help improve care coordination. Users with access to [employer] or [address] (Web-based application) may access additional patient information including the Discharge Summary. If you are not currently a [address] user and need more information, please call the number listed below in the [place] [address] section and ask to be connected with Medical Records. Facility Information Name Address Phone [address] [address] [phone] Patient Information Patient Name Sex  Kacey Mosquera ([id number]) Male 1950 Discharge Information Admitting Provider Service Area Unit Tawanda Muñiz MD / [address] [address] [facility] / [phone] Discharge Provider Discharge Date/Time Discharge Disposition Destination (none) 2018 (Pending) AHR (none) Patient Language Language ENGLISH [13] Hospital Problems as of 2018  Reviewed: 2018  7:00 AM by Keleey Gaming MD  
  
  
  
 Class Noted - Resolved Last Modified POA Active Problems Osteoarthritis of right shoulder  2018 - Present 2018 by Tawanda Muñiz MD Unknown Entered by Tawanda Muñiz MD  
  
Non-Hospital Problems as of 2018  Reviewed: 2018  7:00 AM by Keeley Gaming MD  
 None You are allergic to the following No active allergies Current Discharge Medication List  
  
START taking these medications Dose & Instructions Dispensing Information Comments  
 acetaminophen 325 mg tablet Commonly known as:  TYLENOL Dose:  650 mg Take 2 Tabs by mouth every six (6) hours as needed for Pain. Quantity:  60 Tab Refills:  0  
   
 celecoxib 200 mg capsule Commonly known as:  CELEBREX  Dose:  200 mg  
 Take 1 Cap by mouth daily for 90 days. Indications: Postoperative Acute Pain Quantity:  30 Cap Refills:  0  
   
 docusate sodium 100 mg capsule Commonly known as:  Thedore Dial Dose:  100 mg Take 1 Cap by mouth two (2) times a day for 90 days. Quantity:  60 Cap Refills:  2  
   
 oxyCODONE IR 5 mg immediate release tablet Commonly known as:  Louis Newberry Okay to take 1 to 3 tabs every 4 to 6 hours as needed for pain Quantity:  80 Tab Refills:  0 CONTINUE these medications which have NOT CHANGED Dose & Instructions Dispensing Information Comments CALCIUM PO Take  by mouth daily. Refills:  0 CINNAMON PO Take  by mouth two (2) times a day. Refills:  0  
   
 COQ10  100-100 mg-unit Cap Generic drug:  coenzyme q10-vitamin e Take  by mouth. Refills:  0  
   
 CRESTOR 10 mg tablet Generic drug:  rosuvastatin Dose:  10 mg Take 10 mg by mouth nightly. Refills:  0  
   
 FISH OIL 1,000 mg Cap Generic drug:  omega-3 fatty acids-vitamin e Dose:  1 Cap Take 1 Cap by mouth. Refills:  0  
   
 garlic Cap Take  by mouth. Refills:  0 LOTENSIN 5 mg tablet Generic drug:  benazepril Dose:  5 mg Take 5 mg by mouth daily. Refills:  0  
   
 lutein 40 mg Cap Take  by mouth. Refills:  0  
   
 lycopene 10 mg Cap Take  by mouth. Refills:  0 MULTIVITAMIN WITH MINERALS PO Take  by mouth. Refills:  0  
   
 saw palmetto 320 mg Cap Take  by mouth. Refills:  0  
   
 VITAMIN D3 1,000 unit tablet Generic drug:  cholecalciferol Take  by mouth daily. Refills:  0 STOP taking these medications Comments  
 aspirin 81 mg tablet Surgery Information ID Date/Time Status Primary Surgeon All Procedures Location  [id number] 2/8/2018 0673 Unposted Ana Obando MD RIGHT TOTAL SHOULDER REPLACEMENT/DEPUY/T-MAX/SPIDER/2 SA'S/NERVE BLOCK [facility] OR    
  
 Follow-up Information Follow up With Details Comments Contact Info Parish Myers MD On 2/14/2018 Appointment at 11:30 am [address] [facility] [address] [facility] [address] [id number] 
[phone] Danyel Lamas MD On 2/21/2018 Appointment at 10:25 am [address] [facility] [address] [facility] 
[phone] Discharge Instructions DISCHARGE SUMMARY from Nurse PATIENT INSTRUCTIONS: 
 
After general anesthesia or intravenous sedation, for 24 hours or while taking prescription Narcotics: · Limit your activities · Do not drive and operate hazardous machinery · Do not make important personal or business decisions · Do  not drink alcoholic beverages · If you have not urinated within 8 hours after discharge, please contact your surgeon on call. Report the following to your surgeon: 
· Excessive pain, swelling, redness or odor of or around the surgical area · Temperature over 100.5 · Nausea and vomiting lasting longer than 4 hours or if unable to take medications · Any signs of decreased circulation or nerve impairment to extremity: change in color, persistent  numbness, tingling, coldness or increase pain · Any questions What to do at Home: 
Recommended activity: PT/OT per Home Health,  
 
If you experience any of the following symptoms , please follow up with . *  Please give a list of your current medications to your Primary Care Provider. *  Please update this list whenever your medications are discontinued, doses are 
    changed, or new medications (including over-the-counter products) are added. *  Please carry medication information at all times in case of emergency situations. These are general instructions for a healthy lifestyle: No smoking/ No tobacco products/ Avoid exposure to second hand smoke Surgeon General's Warning:  Quitting smoking now greatly reduces serious risk to your health. Obesity, smoking, and sedentary lifestyle greatly increases your risk for illness A healthy diet, regular physical exercise & weight monitoring are important for maintaining a healthy lifestyle You may be retaining fluid if you have a history of heart failure or if you experience any of the following symptoms:  Weight gain of 3 pounds or more overnight or 5 pounds in a week, increased swelling in our hands or feet or shortness of breath while lying flat in bed. Please call your doctor as soon as you notice any of these symptoms; do not wait until your next office visit. Recognize signs and symptoms of STROKE: 
 
F-face looks uneven A-arms unable to move or move unevenly S-speech slurred or non-existent T-time-call 911 as soon as signs and symptoms begin-DO NOT go Back to bed or wait to see if you get better-TIME IS BRAIN. Warning Signs of HEART ATTACK Call 911 if you have these symptoms: 
? Chest discomfort. Most heart attacks involve discomfort in the center of the chest that lasts more than a few minutes, or that goes away and comes back. It can feel like uncomfortable pressure, squeezing, fullness, or pain. ? Discomfort in other areas of the upper body. Symptoms can include pain or discomfort in one or both arms, the back, neck, jaw, or stomach. ? Shortness of breath with or without chest discomfort. ? Other signs may include breaking out in a cold sweat, nausea, or lightheadedness. Don't wait more than five minutes to call [address]! Fast action can save your life. Calling 911 is almost always the fastest way to get lifesaving treatment. Emergency Medical Services staff can begin treatment when they arrive  up to an hour sooner than if someone gets to the hospital by car. The discharge information has been reviewed with the patient. The patient verbalized understanding.  
Discharge medications reviewed with the patient and appropriate educational materials and side effects teaching were provided. ___________________________________________________________________________________________________________________________________ Patient Discharge Instructions Kaya Zako / [id number] : 1950 Admitted 2018 Discharged: 2018 Take Home Medications · It is important that you take the medication exactly as they are prescribed. · Keep your medication in the bottles provided by the pharmacist and keep a list of the medication names, dosages, and times to be taken in your wallet. · Do not take other medications without consulting your doctor. What to do at [facility] Recommended diet[de-identified] resume home diet. See printed instructions for further information. Call office for any additional follow up instructions [phone] Information obtained by : 
I understand that if any problems occur once I am at home I am to contact my physician. I understand and acknowledge receipt of the instructions indicated above. Physician's or R.N.'s Signature                                                                  Date/Time Patient or Representative Signature                                                          Date/Time Chart Review Routing History No Routing History on File

## 2018-02-08 NOTE — PROGRESS NOTES
Problem: Mobility Impaired (Adult and Pediatric)  Goal: *Acute Goals and Plan of Care (Insert Text)  STG's to be addressed within 3 days:  1. Bed mobility:  Supine to sit to supine S with HR for meals. 2. Activity tolerance: Tolerate up in chair 1-2 hrs for ADLs. 3. Transfers:  Sit to stand to chair S with LRAD for ADL's. LTG's to be addressed within 7 days:  1. Standing/Ambulation Balance:  Increase to Good with LRAD for safe transfers and gait. 2. Ambulation:  Ambulate > 250 ft. S with LRAD for home mobility. 3. Patient Education:  Independent with HEP for home safety. 4. Stairs:  Up/Down 4 steps CGA with HR for home entry. Outcome: Progressing Towards Goal  physical Therapy EVALUATION    Patient: Erika Diaz (88 y.o. male)  Date: 2/8/2018  Primary Diagnosis: Osteoarthritis of right shoulder, unspecified osteoarthritis type [M19.011]  Procedure(s) (LRB):  RIGHT TOTAL SHOULDER REPLACEMENT/DEPUY/T-MAX/SPIDER/2 SA'S/NERVE BLOCK (Right) Day of Surgery   Precautions:  Fall (R UE abduction sling), passive forward flexion to 130 degrees, passive ER to 30 degrees     ASSESSMENT :  Based on the objective data described below, the patient presents to PT s/p R TSA with decreased functional mobility with regard to bed mobility, transfers, gait and overall tolerance for activity. Patient reports that he was independent with ADLs PTA, ambulated without assistive device. Today, patient received semi-reclined position in bed, adjusted sling to R UE for proper fit, patient able to move fingers/wrist on R UE. Shoulder ROM deferred to OT. Patient demonstrated SBA with bed mobility, transitioned into standing SBA without assistive device. Patient able to ambulate ~ 200 ft CGA without assistive device, mild path deviations noted, increased assist from PT for safety and verbal cues provided for decreasing mirian for safety. Patient able to return back to bed, assisted with positioning for comfort.   Patient would benefit from PT to address above impairments and assist with discharge planning. Patient will benefit from skilled intervention to address the above impairments. Patients rehabilitation potential is considered to be Good  Factors which may influence rehabilitation potential include:   [x]         None noted  []         Mental ability/status  []         Medical condition  []         Home/family situation and support systems  []         Safety awareness  []         Pain tolerance/management  []         Other:      PLAN :  Recommendations and Planned Interventions:  [x]           Bed Mobility Training             [x]    Neuromuscular Re-Education  [x]           Transfer Training                   []    Orthotic/Prosthetic Training  [x]           Gait Training                          []    Modalities  [x]           Therapeutic Exercises          []    Edema Management/Control  [x]           Therapeutic Activities            [x]    Patient and Family Training/Education  []           Other (comment):    Frequency/Duration: Patient will be followed by physical therapy 1-2 times per day/4-7 days per week to address goals. Discharge Recommendations: Outpatient  Further Equipment Recommendations for Discharge: N/A     G-CODES     Mobility  Current  CI= 1-19%   Goal  CI= 1-19%. The severity rating is based on the Level of Assistance required for Functional Mobility and ADLs.        G-CODES     Eval Complexity: History: LOW Complexity : Zero comorbidities / personal factors that will impact the outcome / POCExam:LOW Complexity : 1-2 Standardized tests and measures addressing body structure, function, activity limitation and / or participation in recreation  Presentation: LOW Complexity : Stable, uncomplicated   Overall Complexity:LOW     SUBJECTIVE:   Patient stated I am doing pretty good.     OBJECTIVE DATA SUMMARY:     Past Medical History:   Diagnosis Date    Hypercholesterolemia     Hypertension Past Surgical History:   Procedure Laterality Date    ENDOSCOPY, COLON, DIAGNOSTIC      HX CATARACT REMOVAL       Barriers to Learning/Limitations: None  Compensate with: N/A  Prior Level of Function/Home Situation:   Home Situation  Home Environment: Private residence  # Steps to Enter: 3  Rails to Enter: Yes  Hand Rails : Bilateral  One/Two Story Residence: One story  Living Alone: Yes  Support Systems: Friends \ neighbors  Patient Expects to be Discharged to[de-identified] Private residence  Current DME Used/Available at Home: None  Critical Behavior:  Neurologic State: Alert; Appropriate for age  Orientation Level: Appropriate for age;Oriented X4  Cognition: Appropriate decision making; Appropriate for age attention/concentration; Appropriate safety awareness; Follows commands  Safety/Judgement: Awareness of environment; Fall prevention  Psychosocial  Patient Behaviors: Calm; Cooperative  Visitor Behaviors: Appropriate for situation;Calm; Cooperative;Supportive  Skin Condition/Temp: Dry;Warm  Skin Integrity: Incision (comment) (R shoulder incision covered with dressing)  Skin Integumentary  Skin Color: Appropriate for ethnicity  Skin Condition/Temp: Dry;Warm  Skin Integrity: Incision (comment) (R shoulder incision covered with dressing)  Strength:    Strength:  Within functional limits (R UE deferred to OT, all else [facility])  Tone & Sensation:   Tone: Normal  Sensation: Intact  Range Of Motion:  AROM: Within functional limits (R UE deferred to OT, all else [facility])  PROM: Within functional limits (R UE deferred to OT, all else [facility])  Functional Mobility:  Bed Mobility:  Rolling: Stand-by asssistance  Supine to Sit: Stand-by asssistance  Sit to Supine: Stand-by asssistance  Scooting: Stand-by asssistance  Transfers:  Sit to Stand: Stand-by asssistance (without )  Stand to Sit: Stand-by asssistance  Balance:   Sitting: Intact  Standing: Impaired  Standing - Static: Good  Standing - Dynamic : Fair  Ambulation/Gait Training:  Distance (ft): 200 Feet (ft)  Assistive Device:  (None)  Ambulation - Level of Assistance: Contact guard assistance  Base of Support: Widened  Speed/Emani: Pace decreased (<100 feet/min)  Stairs - Level of Assistance:  (N/A)      Pain:  Pt reports 0/10 pain or discomfort prior to treatment.    Pt reports 0/10 pain or discomfort post treatment. Activity Tolerance:   Good  Please refer to the flowsheet for vital signs taken during this treatment. After treatment:   []         Patient left in no apparent distress sitting up in chair  [x]         Patient left in no apparent distress in bed  [x]         Call bell left within reach  [x]         Nursing notified Sharon Bee RN)  [x]         Caregiver present  []         Bed alarm activated  [x]         SCDs in place to B LE     COMMUNICATION/EDUCATION:   [x]         Fall prevention education was provided and the patient/caregiver indicated understanding. [x]         Patient/family have participated as able in goal setting and plan of care. [x]         Patient/family agree to work toward stated goals and plan of care. []         Patient understands intent and goals of therapy, but is neutral about his/her participation. []         Patient is unable to participate in goal setting and plan of care.     Thank you for this referral.  Anamaria Cope, PT   Time Calculation: 18 mins

## 2018-02-08 NOTE — PERIOP NOTES
TRANSFER - OUT REPORT:    Verbal report given to Inga Fontanez on [employer]  being transferred to  for routine post - op       Report consisted of patients Situation, Background, Assessment and   Recommendations(SBAR). Information from the following report(s) SBAR and MAR was reviewed with the receiving nurse. Lines:   Peripheral IV 02/08/18 Left Hand (Active)   Site Assessment Clean, dry, & intact 2/8/2018 11:21 AM   Phlebitis Assessment 0 2/8/2018 11:21 AM   Infiltration Assessment 0 2/8/2018 11:21 AM   Dressing Status Clean, dry, & intact 2/8/2018 11:21 AM   Dressing Type Tape;Transparent 2/8/2018 11:21 AM   Hub Color/Line Status Pink; Infusing 2/8/2018 11:21 AM        Opportunity for questions and clarification was provided.       Patient transported with:   [email or web address]

## 2018-02-09 VITALS
HEART RATE: 87 BPM | WEIGHT: 180.25 LBS | OXYGEN SATURATION: 98 % | RESPIRATION RATE: 18 BRPM | SYSTOLIC BLOOD PRESSURE: 132 MMHG | TEMPERATURE: 98.6 F | HEIGHT: 68 IN | BODY MASS INDEX: 27.32 KG/M2 | DIASTOLIC BLOOD PRESSURE: 82 MMHG

## 2018-02-09 LAB
HCT VFR BLD AUTO: 32.8 % (ref 36–48)
HGB BLD-MCNC: 10.8 G/DL (ref 13–16)

## 2018-02-09 PROCEDURE — 97116 GAIT TRAINING THERAPY: CPT

## 2018-02-09 PROCEDURE — 97535 SELF CARE MNGMENT TRAINING: CPT

## 2018-02-09 PROCEDURE — 97166 OT EVAL MOD COMPLEX 45 MIN: CPT

## 2018-02-09 PROCEDURE — 36415 COLL VENOUS BLD VENIPUNCTURE: CPT | Performed by: ORTHOPAEDIC SURGERY

## 2018-02-09 PROCEDURE — 74011250637 HC RX REV CODE- 250/637: Performed by: ORTHOPAEDIC SURGERY

## 2018-02-09 PROCEDURE — 85018 HEMOGLOBIN: CPT | Performed by: ORTHOPAEDIC SURGERY

## 2018-02-09 PROCEDURE — 74011250636 HC RX REV CODE- 250/636: Performed by: ORTHOPAEDIC SURGERY

## 2018-02-09 RX ORDER — ACETAMINOPHEN 325 MG/1
650 TABLET ORAL
Qty: 60 TAB | Refills: 0 | Status: SHIPPED | OUTPATIENT
Start: 2018-02-09

## 2018-02-09 RX ORDER — DOCUSATE SODIUM 100 MG/1
100 CAPSULE, LIQUID FILLED ORAL 2 TIMES DAILY
Qty: 60 CAP | Refills: 2 | Status: SHIPPED | OUTPATIENT
Start: 2018-02-09 | End: 2018-05-10

## 2018-02-09 RX ORDER — OXYCODONE HYDROCHLORIDE 5 MG/1
TABLET ORAL
Qty: 80 TAB | Refills: 0 | Status: SHIPPED | OUTPATIENT
Start: 2018-02-09 | End: 2018-12-27

## 2018-02-09 RX ORDER — CELECOXIB 200 MG/1
200 CAPSULE ORAL DAILY
Qty: 30 CAP | Refills: 0 | Status: SHIPPED | OUTPATIENT
Start: 2018-02-09 | End: 2018-05-10

## 2018-02-09 RX ADMIN — Medication 10 ML: at 05:11

## 2018-02-09 RX ADMIN — ACETAMINOPHEN 650 MG: 325 TABLET, FILM COATED ORAL at 05:08

## 2018-02-09 RX ADMIN — DOCUSATE SODIUM 100 MG: 100 CAPSULE, LIQUID FILLED ORAL at 09:00

## 2018-02-09 RX ADMIN — OXYCODONE HYDROCHLORIDE 15 MG: 15 TABLET ORAL at 05:08

## 2018-02-09 RX ADMIN — CELECOXIB 200 MG: 100 CAPSULE ORAL at 08:39

## 2018-02-09 RX ADMIN — CHOLECALCIFEROL TAB 25 MCG (1000 UNIT) 1000 UNITS: 25 TAB at 08:40

## 2018-02-09 RX ADMIN — OXYCODONE HYDROCHLORIDE 15 MG: 15 TABLET ORAL at 10:32

## 2018-02-09 RX ADMIN — CEFAZOLIN SODIUM 2 G: 2 SOLUTION INTRAVENOUS at 08:42

## 2018-02-09 RX ADMIN — BENAZEPRIL HYDROCHLORIDE 5 MG: 10 TABLET, FILM COATED ORAL at 08:39

## 2018-02-09 RX ADMIN — HEPARIN SODIUM 5000 UNITS: 5000 INJECTION, SOLUTION INTRAVENOUS; SUBCUTANEOUS at 05:07

## 2018-02-09 NOTE — ANESTHESIA POSTPROCEDURE EVALUATION
Post-Anesthesia Evaluation and Assessment    Patient: Naomi Thomas MRN: [id number]  SSN: [id number]    YOB: 1950  Age: 77 y.o. Sex: male     VS from flow sheet    Cardiovascular Function/Vital Signs  Visit Vitals    /75 (BP 1 Location: Left arm, BP Patient Position: At rest)    Pulse 92    Temp 36.7 °C (98.1 °F)    Resp 16    Ht 5' 8\" (1.727 m)    Wt 81.8 kg (180 lb 4 oz)    SpO2 95%    BMI 27.41 kg/m2       Patient is status post general anesthesia for Procedure(s):  RIGHT TOTAL SHOULDER REPLACEMENT/DEPUY/T-MAX/SPIDER/2 SA'S/NERVE BLOCK. Nausea/Vomiting: None    Postoperative hydration reviewed and adequate. Pain:  Pain Scale 1: Numeric (0 - 10) (02/08/18 1211)  Pain Intensity 1: 0 (02/08/18 1211)   Managed    Neurological Status:   Neuro (WDL): Within Defined Limits (02/08/18 6647)  Neuro  Neurologic State: Alert; Appropriate for age (02/08/18 1700)  Orientation Level: Appropriate for age;Oriented X4 (02/08/18 1700)  Cognition: Appropriate decision making; Appropriate for age attention/concentration; Appropriate safety awareness; Follows commands (02/08/18 1700)   At baseline    Mental Status and Level of Consciousness: Arousable    Pulmonary Status:   O2 Device: Room air (02/08/18 1132)   Adequate oxygenation and airway patent    Complications related to anesthesia: None    Post-anesthesia assessment completed.  No concerns    Signed By: Adiel Gaitan MD     February 8, 2018

## 2018-02-09 NOTE — PROGRESS NOTES
Problem: Mobility Impaired (Adult and Pediatric)  Goal: *Acute Goals and Plan of Care (Insert Text)  STG's to be addressed within 3 days:  1. Bed mobility:  Supine to sit to supine S with HR for meals. 2. Activity tolerance: Tolerate up in chair 1-2 hrs for ADLs. 3. Transfers:  Sit to stand to chair S with LRAD for ADL's. LTG's to be addressed within 7 days:  1. Standing/Ambulation Balance:  Increase to Good with LRAD for safe transfers and gait. 2. Ambulation:  Ambulate > 250 ft. S with LRAD for home mobility. 3. Patient Education:  Independent with HEP for home safety. 4. Stairs:  Up/Down 4 steps CGA with HR for home entry. Outcome: Progressing Towards Goal  physical Therapy TREATMENT    Patient: Riddhi Porter (1 y.o. male)  Date: 2/9/2018  Diagnosis: Osteoarthritis of right shoulder, unspecified osteoarthritis type [M19.011] <principal problem not specified>  Procedure(s) (LRB):  RIGHT TOTAL SHOULDER REPLACEMENT/DEPUY/T-MAX/SPIDER/2 SA'S/NERVE BLOCK (Right) 1 Day Post-Op  Precautions: Fall (R UE abduction sling)  Chart, physical therapy assessment, plan of care and goals were reviewed. ASSESSMENT:  Pt complained of dizziness with sitting and standing however BP was assessed and ws WNL. Pt required min A for supine to sit transfer and SBA to stand from the bed. Pt ambulated 300 feet without assistive device and with CGA, as patient progressed decreased assistance was required for balance. Pt ascended/descended 4 steps with left railing and CGA. Patient was left sitting in recliner with needs in reach. Progression toward goals:  [x]      Improving appropriately and progressing toward goals  []      Improving slowly and progressing toward goals  []      Not making progress toward goals and plan of care will be adjusted     PLAN:  Patient continues to benefit from skilled intervention to address the above impairments. Continue treatment per established plan of care.   Discharge Recommendations: Outpatient  Further Equipment Recommendations for Discharge:  N/A     G-CODES:     Mobility  Current  CJ= 20-39%   Goal  CI= 1-19%. The severity rating is based on the Level of Assistance required for Functional Mobility and ADLs. SUBJECTIVE:   Patient stated I'm pretty dizzy but I guess it's because I haven't been up since yesterday.     OBJECTIVE DATA SUMMARY:   Critical Behavior:  Neurologic State: Alert  Orientation Level: Oriented X4  Cognition: Appropriate safety awareness  Safety/Judgement: Awareness of environment, Fall prevention  Functional Mobility Training:  Bed Mobility:  Supine to Sit: Minimum assistance  Transfers:  Sit to Stand: Stand-by asssistance  Stand to Sit: Stand-by asssistance  Bed to Chair: Contact guard assistance  Balance:  Sitting: Intact  Standing: Without support  Standing - Static: Good  Standing - Dynamic : Fair  Ambulation/Gait Training:  Distance (ft): 300 Feet (ft)  Assistive Device:  (none)  Ambulation - Level of Assistance: Contact guard assistance  Gait Abnormalities: Decreased step clearance  Base of Support: Widened  Speed/Emani: Slow    Stairs:  Number of Stairs Trained: 4  Stairs - Level of Assistance: Contact guard assistance  Rail Use: Left     Therapeutic Exercises: Ankle pumps  Pain:  Pt reports 8/10 pain or discomfort prior to treatment.    Pt reports 8/10 pain or discomfort post treatment. Activity Tolerance:   Fair+  Please refer to the flowsheet for vital signs taken during this treatment.   After treatment:   [x] Patient left in no apparent distress sitting up in chair  [] Patient left in no apparent distress in bed  [x] Call bell left within reach  [x] Nursing notified  [] Caregiver present  [] Bed alarm activated     Educated patient on having assistance with mobility due to dizziness    Peewee Fisher PT   Time Calculation: 20 mins

## 2018-02-09 NOTE — PROGRESS NOTES
Care Management Interventions  PCP Verified by CM: Yes (Dr. Ruben Pizarro)  Mode of Transport at Discharge: Other (see comment) (friend)  Transition of Care Consult (CM Consult): Discharge Planning (No needs, will follow up at [facility])  Discharge Durable Medical Equipment: No  Physical Therapy Consult: Yes  Occupational Therapy Consult: Yes  Current Support Network: Lives Alone (Pt lives alone, will have support of friends)  Confirm Follow Up Transport: Friends (drives at baseline. Friends will assist until able.)  Plan discussed with Pt/Family/Caregiver: Yes  Discharge Location  Discharge Placement: Home    Patient is a 78 yo male admitted for right shoulder replacement. Patient is alert and oriented. No discharge needs at this time. Patient will follow up in the office at [facility]. Patient lives alone, but his friend who is taking him home tonight will stay overnight. Patient also reports the support of other friends who will assist with needs until he is able to perform his ADLs/IADLs independently again. At baseline patient drives, does not use any DME, and is able to meet all his own needs.

## 2018-02-09 NOTE — OP NOTES
[address] [place] [address]   OPERATIVE REPORT    Isabella Flores  MR#: [id number]  : 1950  ACCOUNT #: [de-identified]   DATE OF SERVICE: 2018    PREOPERATIVE DIAGNOSIS:  Right shoulder glenohumeral osteoarthritis. POSTOPERATIVE DIAGNOSIS:  Right shoulder glenohumeral osteoarthritis. PROCEDURES PERFORMED:  Right total shoulder arthroplasty. ATTENDING SURGEON:  Santo Avila MD     ASSISTANTS:  Dia Jimenes and Jelani Alonso, surgical assistants. ANESTHESIA:  General with regional block and Exparel infiltration. ESTIMATED BLOOD LOSS:  150 mL. COMPLICATIONS:  None. SPECIMENS:  None. COUNTS:  Correct at the end of the case. STATEMENT OF MEDICAL NECESSITY:  The patient is a 64-year-old male who has had longstanding right shoulder pain. He has tried injections, physical therapy, and medications without significant relief and therefore elected to move forward with right total shoulder arthroplasty. He understood all risks, benefits and alternatives prior to surgery. IMPLANTS USED:  Included DeP[employer] Unite system including a standard stem size 10 and anatomic proximal body 135 degree size 10 eccentric humeral head 48 x 15 and a 44 mm global anchor peg glenoid. OPERATIVE COURSE:  The patient was taken to the operating room and placed on the operating table in supine position. General anesthesia was administered and preoperative intravenous antibiotics were given after the patient was correctly identified. The right arm was then prepped and draped in usual sterile fashion. It had been marked in the preop holding area. The operation began after surgical timeout was performed and then approximately a 10 cm incision was made over the anterior shoulder, medial to the coracoid process and down extending towards the pec insertion. This was taken down through skin and superficial subcutaneous tissue, achieving hemostasis with Bovie cautery.   The scissors were then used to identify the deltopectoral groove. This was dissected with the scissors, revealing cephalic vein, which was then mobilized and retracted laterally, opening up the deltopectoral interval, exposing the clavipectoral fascia, which was then incised. The conjoined tendon was identified and the biceps tendon was identified. The sheath for the biceps tendon was opened and I carried out this opening proximally into the shoulder joint itself. There was a large amount of joint fluid expressed at this time. The biceps tendon was released proximally. It was very thickened and swollen and it was tagged for later tenodesis. I then identified the lesser tuberosity and the subscapularis as well as three sisters vasculature at the inferior border of the subscapularis. This was tied off using silk ties and cauterized. Then, the subscap was released in the tendinous region for later repair and tagged. I then externally rotated the shoulder and was able to dislocate the shoulder and bring the humeral head into view. I was able to visualize the rotator cuff, which appeared to be intact and get my starting point for my opening reamer. I used the canal finder reamer and advanced this down the humeral canal and then reamed up until I was at a size 10, which was a good fit. I then made my cut using the cutting guide. I pinned this into position and then made my humeral head cut without difficulty and then placed a cap over the humerus and retracted this posteriorly to get exposure of the glenoid. We used a glenoid retractor posteriorly, as well as anteriorly and was able to visualize the glenoid. I removed excess labral tissue as well as some of the anterior capsule in order to gain better visualization. Once I had done so, I then used my guide to place my pin for the glenoid piece. I advanced this and then I was able to ream over the pin, first on power and then on hand and got a good bleeding base.   I then used the central drill and then placed the guide for the drill holes for the pegs. I was able to do this without difficulty and then trialed a 44, which fit well. I then thoroughly irrigated out the glenoid and placed the final 44 using cemented technique with the bone graft within the central peg. Once the cement had hardened, I then retracted the humerus anteriorly again and finished preparation of the humeral head. I used my version broach and broached this down and then placed my trial.  I set the version to approximately 20 degrees and then I trialed with a 48 x 15 eccentric humeral head. I reduced the shoulder and I felt that I overall had a good 50% posterior translation with good recoil and overall good range of motion, so I elected to go with this as my final construct. I then marked my rotation, removed the trial implants and then placed my final humeral stem after thoroughly irrigating out the humerus and malleted it down without difficulty. I then impacted on my eccentric head and reduced the shoulder and was again happy with the construct overall. I then repaired the subscapularis with #2 FiberWire including the rotator interval and then infiltrated the soft tissues with Exparel. I then tenodesed the biceps to the insertion tendon using a #2 FiberWire as well and then transected the remaining portion   of the biceps. I then closed the deltopectoral groove with #1 Vicryl, the superficial layer 2-0 Vicryl, and the skin with 4-0 Monocryl and covered this with Steri-Strips Op-Site dressing. The patient was then placed into a sling and awoke from anesthesia and transferred to PACU for recovery. POSTOPERATIVE COURSE:  The patient will start occupational therapy and physical therapy in the hospital and will carry on outpatient therapy.       MD GABI Bradley / [place]  D: 02/08/2018 18:06     T: 02/09/2018 08:07  JOB #: [id number]

## 2018-02-09 NOTE — ROUTINE PROCESS
Gave report to MUSA ROMERO RN to assume care. Bedside and Verbal shift change report given to Nereyda Sandoval RN (oncoming nurse) by Fer Pelayo RN   (offgoing nurse). Report included the following information SBAR, Kardex, Procedure Summary, Intake/Output, MAR and Recent Results.

## 2018-02-09 NOTE — ROUTINE PROCESS
6375: Stable. On pain management. Denies nausea or vomiting. No issue. Will continue to monitor the pt.

## 2018-02-09 NOTE — PROGRESS NOTES
Problem: Self Care Deficits Care Plan (Adult)  Goal: *Acute Goals and Plan of Care (Insert Text)  Occupational Therapy Goals  Initiated 2/9/2018 within 7 day(s). 1.  Patient will perform upper body dressing with supervision/set-up with adaptive strategies   2. Patient will myla/doff RUE sling with supervision/set-up  Outcome: Progressing Towards Goal  Occupational Therapy EVALUATION    Patient: Naomi Thomas (75 y.o. male)  Date: 2/9/2018  Primary Diagnosis: Osteoarthritis of right shoulder, unspecified osteoarthritis type [M19.011]  Procedure(s) (LRB):  RIGHT TOTAL SHOULDER REPLACEMENT/DEPUY/T-MAX/SPIDER/2 SA'S/NERVE BLOCK (Right) 1 Day Post-Op   Precautions:   Fall (RUE sling, RUE NWB) Dr. Shara Campo: PROM shoulder forward flexion in scapular plane up to 130 degrees, shoulder external rotation no more then 30 degrees    ASSESSMENT :  Based on the objective data described below, the patient was sitting in the chair upon arrival. Patient educated on Dr. Latoya Hunter restrictions for R shoulder, patient reported understanding. Patient educated on one-handed technique for donning/doffing pants and socks; patient was able to perform LE dressing tasks with supervision using LUE. Patient educated on how to doff/myla RUE sling. Patient needs min A for placement of R elbow in sling. Patient educated on how to perform UE dressing tasks (using pullover and button up shirt) within precautions. Patient needed min A for LUE button up shirt and simulated pullover shirt. Patient educated and needed additional time for UE dressing adaptive strategies. Recommend to have assistance with donning/doffing sling and shirt at home for safety; patient reported his friend will assist at home. Patient left comfortable in no distress in chair with sling in place. Patient will benefit from skilled intervention to address the above impairments.   Patients rehabilitation potential is considered to be Good  Factors which may influence rehabilitation potential include:   []             None noted  []             Mental ability/status  [x]             Medical condition  []             Home/family situation and support systems  []             Safety awareness  []             Pain tolerance/management  []             Other:      PLAN :  Recommendations and Planned Interventions:  [x]               Self Care Training                  [x]        Therapeutic Activities  [x]               Functional Mobility Training    []        Cognitive Retraining  [x]               Therapeutic Exercises           []        Endurance Activities  [x]               Balance Training                   []        Neuromuscular Re-Education  []               Visual/Perceptual Training     []   Home Safety Training  [x]               Patient Education                 []        Family Training/Education  []               Other (comment):    Frequency/Duration: Patient will be followed by occupational therapy 1-2 times per day/4-7 days per week to address goals. Discharge Recommendations: Home Health/Outpatient   Further Equipment Recommendations for Discharge: N/A     Barriers to Learning/Limitations: yes;  physical  Compensate with: visual, verbal, tactile, kinesthetic cues/model     PATIENT COMPLEXITY      Eval Complexity: History: LOW Complexity : Brief history review ; Examination: LOW Complexity : 1-3 performance deficits relating to physical, cognitive , or psychosocial skils that result in activity limitations and / or participation restrictions ; Decision Making:LOW Complexity : No comorbidities that affect functional and no verbal or physical assistance needed to complete eval tasks  Assessment: Low Complexity     G-CODES:     Self Care  Current  CJ= 20-39%   Goal  CI= 1-19%. The severity rating is based on the Level of Assistance required for Functional Mobility and ADLs. SUBJECTIVE:   Patient stated [place] I understand how to do it.     OBJECTIVE DATA SUMMARY:     Past Medical History:   Diagnosis Date    Hypercholesterolemia     Hypertension      Past Surgical History:   Procedure Laterality Date    ENDOSCOPY, COLON, DIAGNOSTIC      HX CATARACT REMOVAL       Prior Level of Function/Home Situation: Patient reported was independent in basic self care tasks and functional mobility PTA. Home Situation  Home Environment: Private residence  # Steps to Enter: 3  Rails to Enter: Yes  Hand Rails : Bilateral  One/Two Story Residence: One story  Living Alone: Yes (having a friend stay with him for a couple of days)  Support Systems: Friends \ neighbors  Patient Expects to be Discharged to[de-identified] Private residence  Current DME Used/Available at Home: Shower chair, Grab bars  Tub or Shower Type: Tub/Shower combination  [x]  Right hand dominant   []  Left hand dominant  Cognitive/Behavioral Status:  Neurologic State: Alert  Orientation Level: Oriented X4  Cognition: Follows commands    Skin: No skin changes noted    Edema: No edema noted    Vision/Perceptual:       Vision: WFL    Coordination:  Coordination:  (WFL LUE, not assessed RUE)     Balance:  Sitting: Intact  Standing: Impaired; Without support  Standing - Static: Good  Standing - Dynamic : Fair    Strength:  Strength:  (WFL LUE, not assessed RUE)      Tone & Sensation:  Tone: Normal (BUEs)     Range of Motion:  AROM:  (LUE and R gripWFL )  PROM:  (right shoulder flexion/scapular plane: approx 0-45 degrees)     Functional Mobility and Transfers for ADLs:  Bed Mobility:  Patient sitting in chair upon arrival  Scooting: Independent  Transfers:  Sit to Stand: Stand-by asssistance    Toilet Transfer : Stand-by asssistance (simulated )      ADL Assessment:(clinical judgement)  Feeding: Independent (LUE)    Oral Facial Hygiene/Grooming: Supervision (using LUE)    Bathing: Minimum assistance    Upper Body Dressing: Minimum assistance    Lower Body Dressing: Stand-by assistance;Supervision (adaptive strategies )    Toileting: Supervision (using LUE)     Pain:  Pt reports 10/10 pain or discomfort prior to treatment.    Pt reports 9/10 pain or discomfort post treatment. Activity Tolerance:   Fair    Please refer to the flowsheet for vital signs taken during this treatment. After treatment:   [x] Patient left in no apparent distress sitting up in chair  [] Patient left in no apparent distress in bed  [x] Call bell left within reach  [x] Nursing notified  [] Caregiver present  [] Bed alarm activated    COMMUNICATION/EDUCATION:   [] Home safety education was provided and the patient/caregiver indicated understanding. [] Patient/family have participated as able in goal setting and plan of care. [x] Patient/family agree to work toward stated goals and plan of care. [] Patient understands intent and goals of therapy, but is neutral about his/her participation. [] Patient is unable to participate in goal setting and plan of care.     Thank you for this referral.  Adore Thompson OTR/L  Time Calculation: 84 mins

## 2018-02-09 NOTE — PROGRESS NOTES
[facility]  Pt overall doing well, pain controlled with meds  AFVSS  RUE:  Dressing c/d/i  SILT  Motor intact throughout hand  2+ radial pulse, WWP    XR: Components in good position  A s/p R TSA  P  1.  OOB today  2. OT today to teach sling usage and dressing  3. Pain control  4. Home today with outpatient physical therapy    Payal Garay MD

## 2018-02-09 NOTE — ROUTINE PROCESS
Mobility Intervention:       [] Pt dangled at edge of bed    [] Pt assisted OOB to bedside commode    [] Pt assisted OOB to chair    [] Pt ambulated to bathroom    [] Patient was ambulated in room/hallway    Assistive Device Utilized:       [] Rolling walker   [] Crutches   [] Straight Cane   [] Knee immobilizer   [] IV pole    After Mobilization:     [x] Patient left in no apparent distress sitting up in chair  [x] Patient left in no apparent distress in bed  [x] Call bell left within reach  [x] SCDs on & machine turned on  [] Ice applied  [] RN notified  [] Caregiver present  [x] Bed alarm activated    Reason patient not mobilized:      [] Patient refused   [] Nausea/vomiting   [] Low blood pressure   [] Drowsy/lethargic    Pain Rating:     [] 0  [] 1  Assistive Device:        [] 2  [x] 3  [] 4  [] 5  [] 6  Assistive Device:        [] 7  [] 8  [] 9  [] 10    Comments:

## 2018-02-09 NOTE — DISCHARGE INSTRUCTIONS
DISCHARGE SUMMARY from Nurse    PATIENT INSTRUCTIONS:    After general anesthesia or intravenous sedation, for 24 hours or while taking prescription Narcotics:  · Limit your activities  · Do not drive and operate hazardous machinery  · Do not make important personal or business decisions  · Do  not drink alcoholic beverages  · If you have not urinated within 8 hours after discharge, please contact your surgeon on call. Report the following to your surgeon:  · Excessive pain, swelling, redness or odor of or around the surgical area  · Temperature over 100.5  · Nausea and vomiting lasting longer than 4 hours or if unable to take medications  · Any signs of decreased circulation or nerve impairment to extremity: change in color, persistent  numbness, tingling, coldness or increase pain  · Any questions    What to do at Home:  Recommended activity: PT/OT per Home Health,     If you experience any of the following symptoms , please follow up with . *  Please give a list of your current medications to your Primary Care Provider. *  Please update this list whenever your medications are discontinued, doses are      changed, or new medications (including over-the-counter products) are added. *  Please carry medication information at all times in case of emergency situations. These are general instructions for a healthy lifestyle:    No smoking/ No tobacco products/ Avoid exposure to second hand smoke  Surgeon General's Warning:  Quitting smoking now greatly reduces serious risk to your health.     Obesity, smoking, and sedentary lifestyle greatly increases your risk for illness    A healthy diet, regular physical exercise & weight monitoring are important for maintaining a healthy lifestyle    You may be retaining fluid if you have a history of heart failure or if you experience any of the following symptoms:  Weight gain of 3 pounds or more overnight or 5 pounds in a week, increased swelling in our hands or feet or shortness of breath while lying flat in bed. Please call your doctor as soon as you notice any of these symptoms; do not wait until your next office visit. Recognize signs and symptoms of STROKE:    F-face looks uneven    A-arms unable to move or move unevenly    S-speech slurred or non-existent    T-time-call 911 as soon as signs and symptoms begin-DO NOT go       Back to bed or wait to see if you get better-TIME IS BRAIN. Warning Signs of HEART ATTACK     Call 911 if you have these symptoms:   Chest discomfort. Most heart attacks involve discomfort in the center of the chest that lasts more than a few minutes, or that goes away and comes back. It can feel like uncomfortable pressure, squeezing, fullness, or pain.  Discomfort in other areas of the upper body. Symptoms can include pain or discomfort in one or both arms, the back, neck, jaw, or stomach.  Shortness of breath with or without chest discomfort.  Other signs may include breaking out in a cold sweat, nausea, or lightheadedness. Don't wait more than five minutes to call 911 - MINUTES MATTER! Fast action can save your life. Calling 911 is almost always the fastest way to get lifesaving treatment. Emergency Medical Services staff can begin treatment when they arrive -- up to an hour sooner than if someone gets to the hospital by car. The discharge information has been reviewed with the patient. The patient verbalized understanding. Discharge medications reviewed with the patient and appropriate educational materials and side effects teaching were provided. ___________________________________________________________________________________________________________________________________  Patient Discharge Instructions    Elizabeth Mayfield / [id number] : 1950    Admitted 2018 Discharged: 2018     Take Home Medications       · It is important that you take the medication exactly as they are prescribed.    · Keep your medication in the bottles provided by the pharmacist and keep a list of the medication names, dosages, and times to be taken in your wallet. · Do not take other medications without consulting your doctor. What to do at [address][de-identified] resume home diet. See printed instructions for further information. Call office for any additional follow up instructions [phone]      Information obtained by :  I understand that if any problems occur once I am at home I am to contact my physician. I understand and acknowledge receipt of the instructions indicated above.                                                                                                                                            Physician's or R.N.'s Signature                                                                  Date/Time                                                                                                                                              Patient or Representative Signature                                                          Date/Time

## 2018-02-09 NOTE — DISCHARGE SUMMARY
Discharge Summary    Patient: Juan Aguero               Sex: male          DOA: 2/8/2018         YOB: 1950      Age:  77 y.o.        LOS:  LOS: 1 day                Admit Date: 2/8/2018    Discharge Date: 2/9/2018    Admission Diagnoses: Osteoarthritis of right shoulder, unspecified osteoarthritis type [M19.011]    Discharge Diagnoses:    Problem List as of 2/9/2018  Date Reviewed: 2/8/2018          Codes Class Noted - Resolved    Osteoarthritis of right shoulder ICD-10-CM: M19.011  ICD-9-CM: 715.91  2/8/2018 - Present              Discharge Condition: Good    Hospital Course: The patient was admitted after an uncomplicated right total shoulder arthroplasty. Postoperatively he progressed well with pain control and physical and occupational therapy. By POD#1 he was ready for discharge. He was discharged home with outpatient therapy follow up. Consults: None    Significant Diagnostic Studies: radiology: X-Ray: Right shoulder with components in good position    Discharge Medications:     Current Discharge Medication List      START taking these medications    Details   acetaminophen (TYLENOL) 325 mg tablet Take 2 Tabs by mouth every six (6) hours as needed for Pain. Qty: 60 Tab, Refills: 0      celecoxib (CELEBREX) 200 mg capsule Take 1 Cap by mouth daily for 90 days. Indications: Postoperative Acute Pain  Qty: 30 Cap, Refills: 0      docusate sodium (COLACE) 100 mg capsule Take 1 Cap by mouth two (2) times a day for 90 days. Qty: 60 Cap, Refills: 2      oxyCODONE IR (ROXICODONE) 5 mg immediate release tablet Okay to take 1 to 3 tabs every 4 to 6 hours as needed for pain  Qty: 80 Tab, Refills: 0    Associated Diagnoses: Primary osteoarthritis of right shoulder         CONTINUE these medications which have NOT CHANGED    Details   CINNAMON BARK (CINNAMON PO) Take  by mouth two (2) times a day. CALCIUM PO Take  by mouth daily.       lycopene 10 mg cap Take  by mouth.      lutein 40 mg cap Take  by mouth.      coenzyme q10-vitamin e (COQ10 ) 100-100 mg-unit cap Take  by mouth.      garlic cap Take  by mouth. rosuvastatin (CRESTOR) 10 mg tablet Take 10 mg by mouth nightly. benazepril (LOTENSIN) 5 mg tablet Take 5 mg by mouth daily. saw palmetto 320 mg Cap Take  by mouth. cholecalciferol, vitamin d3, (VITAMIN D3) 1,000 unit tablet Take  by mouth daily. MULTIVITAMIN WITH MINERALS PO Take  by mouth. omega-3 fatty acids-vitamin e (FISH OIL) 1,000 mg cap Take 1 Cap by mouth. STOP taking these medications       aspirin 81 mg tablet Comments:   Reason for Stopping:               Activity: Activity as tolerated and continue use of sling as instructed    Diet: Regular Diet    Wound Care: Keep wound clean and dry and Reinforce dressing PRN    Follow-up: 2 Weeks with Dr. Noy Hernandez (2/21/2018) - [phone]    Payal Hernandez MD

## 2018-02-12 ENCOUNTER — PATIENT OUTREACH (OUTPATIENT)
Dept: CASE MANAGEMENT | Age: 68
End: 2018-02-12

## 2018-02-12 NOTE — PROGRESS NOTES
Shoulder Bundle initial progress notes. Surgery date: 2/8/18    Hospital d/c: 2/9/18    POD # 4    Current dispo: home    RRAT score  5 - Low    Spoke with patient via phone, confirmed with 2 identifiers. NN introduced self and explained reason for this outreach/NN role. ortho follow up: 2/21/18    Outpatient PT-  To begin at [address] after follow up appointment with Dr. Carlee Zhou    Pain level-  6/10    Sling- continuous, removes for approximately 1 hour per day total, to bath and perform home exercises    Performed medication reconciliation with patient, and patient verbalizes understanding of administration of home medications. There were no barriers to obtaining medications identified at this time. Patient reports that discharge paperwork from Dr. Ruby Dove office directs him to take aspirin 325mg daily for 14 days, NN instructed patient to call Dr. Ruby Dove office for clarification of this medication order, states he will call after this phone call. Patient has follow up appointment with PCP on 2/14/18. Patient complains of \"some constipation\". NN instructed patient to force fluids to 6-8 glasses per day, to eat diet that includes fiber and remain active (walking) throughout the day, patient verbalizes understanding. Reports that he is taking Colace as ordered. NN instructed patient on what to report to physician: increased pain not relieved with medication, increased incisional drainage, drainage that is green or bloody in color, temp > 100.5, swelling/redness at incisional site; patient verbalizes understanding.

## 2018-02-20 ENCOUNTER — PATIENT OUTREACH (OUTPATIENT)
Dept: CASE MANAGEMENT | Age: 68
End: 2018-02-20

## 2018-02-20 NOTE — PROGRESS NOTES
Shoulder Bundle follow up progress notes.          Surgery date: 2/8/18     Hospital d/c: 2/9/18    POD # 12    NN attempted to contact patient at listed number. VM left identifying self, direct contact information given with request for return call. NN will attempt to contact patient at later time.

## 2018-03-12 ENCOUNTER — PATIENT OUTREACH (OUTPATIENT)
Dept: CASE MANAGEMENT | Age: 68
End: 2018-03-12

## 2018-03-22 ENCOUNTER — PATIENT OUTREACH (OUTPATIENT)
Dept: CASE MANAGEMENT | Age: 68
End: 2018-03-22

## 2018-03-22 NOTE — PROGRESS NOTES
Shoulder Bundle follow up progress notes. Surgery date: 2/8/18  [facility] d/c: 2/9/18      POD # 43     Spoke with patient via phone, confirmed with 2 identifiers. Current dispo: home     Next scheduled ortho f/u: 4/18/18    Outpatient PT - [facility]. Will continue 2x week thru mid-May per patient    Pain level  - 3-4/10    Sling - discontinued at Ortho appointment on 3/21/18    Performed medication reconciliation with patient, and patient verbalizes understanding of administration of home medications. There were no barriers to obtaining medications identified at this time.

## 2018-04-12 ENCOUNTER — PATIENT OUTREACH (OUTPATIENT)
Dept: CASE MANAGEMENT | Age: 68
End: 2018-04-12

## 2018-04-12 NOTE — PROGRESS NOTES
Shoulder Bundle follow up progress notes. Surgery date: 2/8/18  [facility] d/c: 2/9/18      POD # 61    NN attempted to contact patient at listed number. VM left identifying self. Direct contact information given with request for return call. NN will attempt to contact patient at later time.

## 2018-04-20 ENCOUNTER — PATIENT OUTREACH (OUTPATIENT)
Dept: CASE MANAGEMENT | Age: 68
End: 2018-04-20

## 2018-04-27 ENCOUNTER — PATIENT OUTREACH (OUTPATIENT)
Dept: CASE MANAGEMENT | Age: 68
End: 2018-04-27

## 2018-04-27 NOTE — PROGRESS NOTES
Shoulder Bundle follow up progress notes. Surgery date: 2/8/18  [facility] d/c: 2/9/18    POD # 66    NN attempted to contact patient at listed number. VM left identifying self. Direct contact information given with request for return call. NN will attempt to contact patient at later time.

## 2018-05-11 ENCOUNTER — PATIENT OUTREACH (OUTPATIENT)
Dept: CASE MANAGEMENT | Age: 68
End: 2018-05-11

## 2018-05-11 NOTE — PROGRESS NOTES
Shoulder Bundle follow up progress notes. Surgery date: 2/8/18  [facility] d/c: 2/9/18    POD # 80  NN attempted to contact patient at listed number. VM left identifying self. Direct contact information given.

## 2018-05-25 ENCOUNTER — PATIENT OUTREACH (OUTPATIENT)
Dept: CASE MANAGEMENT | Age: 68
End: 2018-05-25

## 2018-05-25 NOTE — PROGRESS NOTES
Shoulder Bundle follow up progress notes. Surgery date: 2/8/18  [facility] d/c: 2/9/18    NN attempted to contact patient for discharge assessment. VM left identifying self, explained that NN closing out episode and left direct contact information. Patient has not had any ED visits/hospitalizations since [facility] discharge 2/9/18. NN will resolve this episode.

## 2018-09-04 ENCOUNTER — HOSPITAL ENCOUNTER (OUTPATIENT)
Age: 68
Discharge: HOME | End: 2018-09-04
Attending: ORTHOPAEDIC SURGERY
Payer: MEDICARE

## 2018-09-04 DIAGNOSIS — M51.16 LUMBAR DISC DISEASE WITH RADICULOPATHY: ICD-10-CM

## 2018-09-04 PROCEDURE — 72148 MRI LUMBAR SPINE W/O DYE: CPT

## 2018-10-29 ENCOUNTER — OFFICE VISIT (OUTPATIENT)
Dept: ORTHOPEDIC SURGERY | Facility: CLINIC | Age: 68
End: 2018-10-29

## 2018-10-29 VITALS
OXYGEN SATURATION: 96 % | DIASTOLIC BLOOD PRESSURE: 81 MMHG | TEMPERATURE: 97.5 F | HEIGHT: 68 IN | HEART RATE: 81 BPM | WEIGHT: 186.2 LBS | RESPIRATION RATE: 18 BRPM | SYSTOLIC BLOOD PRESSURE: 140 MMHG | BODY MASS INDEX: 28.22 KG/M2

## 2018-10-29 DIAGNOSIS — M47.812 NECK ARTHRITIS: ICD-10-CM

## 2018-10-29 DIAGNOSIS — Z96.611 H/O TOTAL SHOULDER REPLACEMENT, RIGHT: Primary | ICD-10-CM

## 2018-10-29 DIAGNOSIS — M25.512 LEFT SHOULDER PAIN, UNSPECIFIED CHRONICITY: ICD-10-CM

## 2018-10-29 DIAGNOSIS — M54.2 NECK PAIN: ICD-10-CM

## 2018-10-29 DIAGNOSIS — M47.816 ARTHRITIS, LUMBAR SPINE: ICD-10-CM

## 2018-10-29 DIAGNOSIS — M19.012 PRIMARY OSTEOARTHRITIS OF LEFT SHOULDER: ICD-10-CM

## 2018-10-29 RX ORDER — GUAIFENESIN 100 MG/5ML
81 LIQUID (ML) ORAL DAILY
COMMUNITY
End: 2019-01-08

## 2018-10-29 RX ORDER — LANOLIN ALCOHOL/MO/W.PET/CERES
1000 CREAM (GRAM) TOPICAL DAILY
COMMUNITY

## 2018-10-29 RX ORDER — CELECOXIB 200 MG/1
200 CAPSULE ORAL DAILY
COMMUNITY
End: 2018-12-27
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
## 2018-11-02 ENCOUNTER — HOSPITAL ENCOUNTER (OUTPATIENT)
Age: 68
Discharge: HOME | End: 2018-11-02
Attending: ORTHOPAEDIC SURGERY
Payer: MEDICARE

## 2018-11-02 DIAGNOSIS — M19.012 PRIMARY OSTEOARTHRITIS OF LEFT SHOULDER: ICD-10-CM

## 2018-11-02 PROCEDURE — 73221 MRI JOINT UPR EXTREM W/O DYE: CPT

## 2018-11-13 ENCOUNTER — OFFICE VISIT (OUTPATIENT)
Dept: ORTHOPEDIC SURGERY | Facility: CLINIC | Age: 68
End: 2018-11-13

## 2018-11-13 VITALS
HEART RATE: 86 BPM | BODY MASS INDEX: 27.58 KG/M2 | TEMPERATURE: 96.6 F | SYSTOLIC BLOOD PRESSURE: 123 MMHG | OXYGEN SATURATION: 94 % | HEIGHT: 68 IN | DIASTOLIC BLOOD PRESSURE: 83 MMHG | WEIGHT: 182 LBS

## 2018-11-13 DIAGNOSIS — M19.012 PRIMARY OSTEOARTHRITIS OF LEFT SHOULDER: Primary | ICD-10-CM

## 2018-11-13 NOTE — PROGRESS NOTES
Patient: Mirza Casillas                MRN: [id number]       SSN: [id number]  YOB: 1950        AGE: 77 y.o. SEX: male  Body mass index is 27.67 kg/m². PCP: Ted Mistry MD  11/13/18    Chief Complaint: Left shoulder follow up    HISTORY OF PRESENT ILLNESS:  Epifanio Peabody is a 75-year-old male who returns to the office today for his left shoulder. He continues to have left shoulder pain. He got his MRI done, and he is here for review. He has also been in physical therapy for his back, which he says has been helping. He is due to get another injection in his back coming up and is also having traction, which is helping with his pain down his left leg. He has no new complaints for his left shoulder. Past Medical History:   Diagnosis Date    Hypercholesterolemia     Hypertension        Family History   Problem Relation Age of Onset    Cancer Mother     Heart Disease Father        Current Outpatient Medications   Medication Sig Dispense Refill    cyanocobalamin 1,000 mcg tablet Take 1,000 mcg by mouth daily.  aspirin 81 mg chewable tablet Take 81 mg by mouth daily.  celecoxib (CELEBREX) 200 mg capsule Take 200 mg by mouth daily.  brimonidine (ALPHAGAN P) 0.1 % ophthalmic solution every eight (8) hours.  acetaminophen (TYLENOL) 325 mg tablet Take 2 Tabs by mouth every six (6) hours as needed for Pain. 60 Tab 0    oxyCODONE IR (ROXICODONE) 5 mg immediate release tablet Okay to take 1 to 3 tabs every 4 to 6 hours as needed for pain 80 Tab 0    CINNAMON BARK (CINNAMON PO) Take  by mouth two (2) times a day.  CALCIUM PO Take  by mouth daily.  lycopene 10 mg cap Take  by mouth.  lutein 40 mg cap Take  by mouth.  omega-3 fatty acids-vitamin e (FISH OIL) 1,000 mg cap Take 1 Cap by mouth.  coenzyme q10-vitamin e (COQ10 ) 100-100 mg-unit cap Take  by mouth.  garlic cap Take  by mouth.       rosuvastatin (CRESTOR) 10 mg tablet Take 10 mg by mouth nightly.  benazepril (LOTENSIN) 5 mg tablet Take 5 mg by mouth daily.  saw palmetto 320 mg Cap Take  by mouth.  cholecalciferol, vitamin d3, (VITAMIN D3) 1,000 unit tablet Take  by mouth daily.  MULTIVITAMIN WITH MINERALS PO Take  by mouth. No Known Allergies    Past Surgical History:   Procedure Laterality Date    ENDOSCOPY, COLON, DIAGNOSTIC      HX CATARACT REMOVAL         Social History     Socioeconomic History    Marital status: SINGLE     Spouse name: Not on file    Number of children: Not on file    Years of education: Not on file    Highest education level: Not on file   Social Needs    Financial resource strain: Not on file    Food insecurity - worry: Not on file    Food insecurity - inability: Not on file    Transportation needs - medical: Not on file   [employer] needs - non-medical: Not on file   Occupational History    Not on file   Tobacco Use    Smoking status: Never Smoker    Smokeless tobacco: Never Used   Substance and Sexual Activity    Alcohol use: Yes     Comment: social    Drug use: No    Sexual activity: Not on file   Other Topics Concern    Not on file   Social History Narrative    Not on file       REVIEW OF SYSTEMS:      No changes from previous review of systems unless noted. PHYSICAL EXAMINATION:  Visit Vitals  /83 (BP 1 Location: Left arm, BP Patient Position: Sitting)   Pulse 86   Temp 96.6 °F (35.9 °C)   Ht 5' 8\" (1.727 m)   Wt 182 lb (82.6 kg)   SpO2 94%   BMI 27.67 kg/m²     Body mass index is 27.67 kg/m². GENERAL: Alert and oriented x3, in no acute distress. HEENT: Normocephalic, atraumatic. RESP: Non labored breathing. SKIN: No rashes or lesions noted. Physical exam of the left shoulder is mostly unchanged from his previous visit. He has limitations in forward flexion, external rotation, and internal rotation. He has crepitus with shoulder range of motion.   Gentle rotator cuff strength testing is within normal limits. IMAGING:  An MRI of the left shoulder was reviewed. This shows glenohumeral arthritis with some posterior subluxation of his humeral head. I do not appreciate any biconcavity of his glenoid. There are no full-thickness rotator cuff tears noted. ASSESSMENT/PLAN:  Marilyn King is a 78-year-old male with left shoulder osteoarthritis. At this point, he has had conservative treatment and had his right shoulder replaced and done well. He would like to move forward with a left shoulder replacement. Therefore, we will have him set up for surgery. We will have the surgery scheduler meet with him, and we will see him back in postoperative followup.             Electronically signed by: Rivas Ureña MD

## 2018-12-19 DIAGNOSIS — Z01.818 PREOPERATIVE TESTING: ICD-10-CM

## 2018-12-19 DIAGNOSIS — M19.012 PRIMARY OSTEOARTHRITIS OF LEFT SHOULDER: Primary | ICD-10-CM

## 2018-12-19 DIAGNOSIS — Z01.811 PRE-OP CHEST EXAM: ICD-10-CM

## 2018-12-19 DIAGNOSIS — Z01.810 PREOP CARDIOVASCULAR EXAM: ICD-10-CM

## 2018-12-19 DIAGNOSIS — D68.9 COAGULOPATHY (HCC): ICD-10-CM

## 2018-12-19 DIAGNOSIS — Z01.812 PRE-OPERATIVE LABORATORY EXAMINATION: ICD-10-CM

## 2018-12-27 RX ORDER — TAMSULOSIN HYDROCHLORIDE 0.4 MG/1
0.4 CAPSULE ORAL
COMMUNITY
End: 2020-05-14

## 2018-12-27 NOTE — PERIOP NOTES
Heather Magana had his health assessment via phone interview. Instructions were given regarding NPO status, medications, Hibiclens washes, and removal of all jewelry and/or body piercing. Instructed patient not to remove the red [employer] armband that was placed on their arm when the Type and Screen was drawn. Opportunity was given to ask questions and all questions were answered. Understanding of instructions was verbalized.

## 2018-12-28 ENCOUNTER — HOSPITAL ENCOUNTER (OUTPATIENT)
Dept: PREADMISSION TESTING | Age: 68
Discharge: HOME | End: 2018-12-28
Payer: MEDICARE

## 2018-12-28 ENCOUNTER — OFFICE VISIT (OUTPATIENT)
Dept: ORTHOPEDIC SURGERY | Facility: CLINIC | Age: 68
End: 2018-12-28

## 2018-12-28 ENCOUNTER — HOSPITAL ENCOUNTER (OUTPATIENT)
Dept: GENERAL RADIOLOGY | Age: 68
Discharge: HOME | End: 2018-12-28
Payer: MEDICARE

## 2018-12-28 VITALS
DIASTOLIC BLOOD PRESSURE: 79 MMHG | RESPIRATION RATE: 18 BRPM | WEIGHT: 182.2 LBS | OXYGEN SATURATION: 98 % | BODY MASS INDEX: 27.61 KG/M2 | SYSTOLIC BLOOD PRESSURE: 141 MMHG | HEART RATE: 77 BPM | HEIGHT: 68 IN | TEMPERATURE: 97.4 F

## 2018-12-28 DIAGNOSIS — M19.012 PRIMARY OSTEOARTHRITIS OF LEFT SHOULDER: ICD-10-CM

## 2018-12-28 DIAGNOSIS — Z01.811 PRE-OP CHEST EXAM: ICD-10-CM

## 2018-12-28 DIAGNOSIS — Z01.818 PREOPERATIVE TESTING: ICD-10-CM

## 2018-12-28 DIAGNOSIS — Z01.818 PRE-OP EXAM: ICD-10-CM

## 2018-12-28 DIAGNOSIS — D68.9 COAGULOPATHY (HCC): ICD-10-CM

## 2018-12-28 DIAGNOSIS — Z01.810 PREOP CARDIOVASCULAR EXAM: ICD-10-CM

## 2018-12-28 DIAGNOSIS — M19.012 PRIMARY OSTEOARTHRITIS OF LEFT SHOULDER: Primary | ICD-10-CM

## 2018-12-28 DIAGNOSIS — Z01.812 PRE-OPERATIVE LABORATORY EXAMINATION: ICD-10-CM

## 2018-12-28 LAB
ABO + RH BLD: NORMAL
ALBUMIN SERPL-MCNC: 4 G/DL (ref 3.4–5)
ALBUMIN/GLOB SERPL: 1.2 {RATIO} (ref 0.8–1.7)
ALP SERPL-CCNC: 61 U/L (ref 45–117)
ALT SERPL-CCNC: 26 U/L (ref 16–61)
ANION GAP SERPL CALC-SCNC: 3 MMOL/L (ref 3–18)
APPEARANCE UR: CLEAR
APTT PPP: 30.1 SEC (ref 23–36.4)
AST SERPL-CCNC: 18 U/L (ref 15–37)
ATRIAL RATE: 75 BPM
BASOPHILS # BLD: 0 K/UL (ref 0–0.1)
BASOPHILS NFR BLD: 1 % (ref 0–2)
BILIRUB SERPL-MCNC: 0.5 MG/DL (ref 0.2–1)
BILIRUB UR QL: NEGATIVE
BLOOD GROUP ANTIBODIES SERPL: NORMAL
BUN SERPL-MCNC: 18 MG/DL (ref 7–18)
BUN/CREAT SERPL: 20 (ref 12–20)
CALCIUM SERPL-MCNC: 8.9 MG/DL (ref 8.5–10.1)
CALCULATED P AXIS, ECG09: 70 DEGREES
CALCULATED R AXIS, ECG10: 23 DEGREES
CALCULATED T AXIS, ECG11: 39 DEGREES
CHLORIDE SERPL-SCNC: 103 MMOL/L (ref 100–108)
CO2 SERPL-SCNC: 31 MMOL/L (ref 21–32)
COLOR UR: YELLOW
CREAT SERPL-MCNC: 0.89 MG/DL (ref 0.6–1.3)
DIAGNOSIS, 93000: NORMAL
DIFFERENTIAL METHOD BLD: ABNORMAL
EOSINOPHIL # BLD: 0.1 K/UL (ref 0–0.4)
EOSINOPHIL NFR BLD: 3 % (ref 0–5)
ERYTHROCYTE [DISTWIDTH] IN BLOOD BY AUTOMATED COUNT: 13.7 % (ref 11.6–14.5)
GLOBULIN SER CALC-MCNC: 3.3 G/DL (ref 2–4)
GLUCOSE SERPL-MCNC: 96 MG/DL (ref 74–99)
GLUCOSE UR STRIP.AUTO-MCNC: NEGATIVE MG/DL
HCT VFR BLD AUTO: 38.7 % (ref 36–48)
HGB BLD-MCNC: 13.4 G/DL (ref 13–16)
HGB UR QL STRIP: NEGATIVE
INR PPP: 1 (ref 0.8–1.2)
KETONES UR QL STRIP.AUTO: NEGATIVE MG/DL
LEUKOCYTE ESTERASE UR QL STRIP.AUTO: NEGATIVE
LYMPHOCYTES # BLD: 1.2 K/UL (ref 0.9–3.6)
LYMPHOCYTES NFR BLD: 23 % (ref 21–52)
MCH RBC QN AUTO: 30.7 PG (ref 24–34)
MCHC RBC AUTO-ENTMCNC: 34.6 G/DL (ref 31–37)
MCV RBC AUTO: 88.6 FL (ref 74–97)
MONOCYTES # BLD: 0.5 K/UL (ref 0.05–1.2)
MONOCYTES NFR BLD: 10 % (ref 3–10)
NEUTS SEG # BLD: 3.3 K/UL (ref 1.8–8)
NEUTS SEG NFR BLD: 63 % (ref 40–73)
NITRITE UR QL STRIP.AUTO: NEGATIVE
P-R INTERVAL, ECG05: 138 MS
PH UR STRIP: 7 [PH] (ref 5–8)
PLATELET # BLD AUTO: 216 K/UL (ref 135–420)
PMV BLD AUTO: 9.4 FL (ref 9.2–11.8)
POTASSIUM SERPL-SCNC: 4.5 MMOL/L (ref 3.5–5.5)
PROT SERPL-MCNC: 7.3 G/DL (ref 6.4–8.2)
PROT UR STRIP-MCNC: NEGATIVE MG/DL
PROTHROMBIN TIME: 12.8 SEC (ref 11.5–15.2)
Q-T INTERVAL, ECG07: 346 MS
QRS DURATION, ECG06: 88 MS
QTC CALCULATION (BEZET), ECG08: 386 MS
RBC # BLD AUTO: 4.37 M/UL (ref 4.7–5.5)
SODIUM SERPL-SCNC: 137 MMOL/L (ref 136–145)
SP GR UR REFRACTOMETRY: 1.01 (ref 1–1.03)
SPECIMEN EXP DATE BLD: NORMAL
UROBILINOGEN UR QL STRIP.AUTO: 0.2 EU/DL (ref 0.2–1)
VENTRICULAR RATE, ECG03: 75 BPM
WBC # BLD AUTO: 5.3 K/UL (ref 4.6–13.2)

## 2018-12-28 PROCEDURE — 85610 PROTHROMBIN TIME: CPT

## 2018-12-28 PROCEDURE — 36415 COLL VENOUS BLD VENIPUNCTURE: CPT

## 2018-12-28 PROCEDURE — 93005 ELECTROCARDIOGRAM TRACING: CPT

## 2018-12-28 PROCEDURE — 71046 X-RAY EXAM CHEST 2 VIEWS: CPT

## 2018-12-28 PROCEDURE — 85730 THROMBOPLASTIN TIME PARTIAL: CPT

## 2018-12-28 PROCEDURE — 85025 COMPLETE CBC W/AUTO DIFF WBC: CPT

## 2018-12-28 PROCEDURE — 81003 URINALYSIS AUTO W/O SCOPE: CPT

## 2018-12-28 PROCEDURE — 86900 BLOOD TYPING SEROLOGIC ABO: CPT

## 2018-12-28 PROCEDURE — 80053 COMPREHEN METABOLIC PANEL: CPT

## 2018-12-28 NOTE — PROGRESS NOTES
History and Physical Performed today and documented in chart ANASTACIO Valle 
12/28/2018. 
3:50 PM

## 2018-12-28 NOTE — H&P
[address] [place] [address], [address] [place] [address] 
[phone] HISTORY & PHYSICAL Patient: Db Sweeney                MRN: [id number]       SSN: [id number] YOB: 1950        AGE: 77 y.o. SEX: male Body mass index is 27.7 kg/m². PCP: Dorene Chaidez MD 
12/28/18 CC: Left  shoulder end stage OA Problem List Items Addressed This Visit None Visit Diagnoses Primary osteoarthritis of left shoulder    -  Primary Pre-op exam      
  
 
 
 
HPI:  The patient is a pleasant 77 y.o. whom has end stage OA of their Left shoulder and has failed conservative treatment including but not limited to NSAIDS, cortisone injections, viscosupplementation, PT, and pain medicine. Due to the current findings and affected activities of daily living, surgical intervention is indicated. The alternatives, risks, complications, as well as expected outcome were discussed. These include but are not limited to infection, blood loss, need for blood transfusion, neurovascular damage, DVT, PE,  post-op stiffness and pain, leg length discrepancy, dislocation, anesthetic complications, prothesis longevity, need for more surgery, MI, stroke, and even death. The patient understands and wishes to proceed with surgery. Past Medical History:  
Diagnosis Date  Hypercholesterolemia  Hypertension Current Outpatient Medications:  
  tamsulosin (FLOMAX) 0.4 mg capsule, Take 0.4 mg by mouth daily (after dinner). , Disp: , Rfl:  
  cyanocobalamin 1,000 mcg tablet, Take 1,000 mcg by mouth daily. , Disp: , Rfl:  
  aspirin 81 mg chewable tablet, Take 81 mg by mouth daily. , Disp: , Rfl:  
  brimonidine (ALPHAGAN P) 0.1 % ophthalmic solution, Administer 1 Drop to both eyes two (2) times a day., Disp: , Rfl:  
  acetaminophen (TYLENOL) 325 mg tablet, Take 2 Tabs by mouth every six (6) hours as needed for Pain., Disp: 60 Tab, Rfl: 0 
  CINNAMON BARK (CINNAMON PO), Take  by mouth two (2) times a day., Disp: , Rfl:  
  CALCIUM PO, Take  by mouth daily. , Disp: , Rfl:  
  lycopene 10 mg cap, Take  by mouth., Disp: , Rfl:  
  lutein 40 mg cap, Take  by mouth., Disp: , Rfl:  
  omega-3 fatty acids-vitamin e (FISH OIL) 1,000 mg cap, Take 1 Cap by mouth., Disp: , Rfl:  
  coenzyme q10-vitamin e (COQ10 ) 100-100 mg-unit cap, Take  by mouth., Disp: , Rfl:  
  garlic cap, Take  by mouth., Disp: , Rfl:  
  rosuvastatin (CRESTOR) 10 mg tablet, Take 10 mg by mouth nightly.  , Disp: , Rfl:  
  benazepril (LOTENSIN) 5 mg tablet, Take 10 mg by mouth daily. , Disp: , Rfl:  
  saw palmetto 320 mg Cap, Take  by mouth.  , Disp: , Rfl:  
  cholecalciferol, vitamin d3, (VITAMIN D3) 1,000 unit tablet, Take  by mouth daily. , Disp: , Rfl:  
  MULTIVITAMIN WITH MINERALS PO, Take  by mouth.  , Disp: , Rfl:  
 
No Known Allergies Social History Socioeconomic History  Marital status: SINGLE Spouse name: Not on file  Number of children: Not on file  Years of education: Not on file  Highest education level: Not on file Social Needs  Financial resource strain: Not on file  Food insecurity - worry: Not on file  Food insecurity - inability: Not on file  Transportation needs - medical: Not on file  Transportation needs - non-medical: Not on file Occupational History  Not on file Tobacco Use  Smoking status: Never Smoker  Smokeless tobacco: Never Used Substance and Sexual Activity  Alcohol use: Yes Comment: social  
 Drug use: No  
 Sexual activity: Not on file Other Topics Concern  Not on file Social History Narrative  Not on file Past Surgical History:  
Procedure Laterality Date  ENDOSCOPY, COLON, DIAGNOSTIC    
 HX CATARACT REMOVAL    
 HX SHOULDER REPLACEMENT Right 02/2018 Family History:  Non-contributory.   
 
PE: 
 Visit Vitals /79 Pulse 77 Temp 97.4 °F (36.3 °C) (Oral) Resp 18 Ht 5' 8\" (1.727 m) Wt 182 lb 3.2 oz (82.6 kg) SpO2 98% BMI 27.70 kg/m² A&O X3, NAD, well develop, well nourished Heart: S1-S2, rrr 
Lungs: CTA bilat Abd: soft, nt, nt, + bs in all quadrants Ext:  Pos distal pulses to DP, PT 
 
X-ray: left shoulder shows end stage OA Labs: labs, EKG, CXR were reviewed and wnl. A:  Left  shoulder end stage OA 
 
P:  At this point we will move forward with surgery LEFT TOTAL SHOULDER REPLACEMENT. Again, the alternatives, risks, complications, as well as expected outcome were discussed and the patient wishes to proceed with surgery. Pt has been instructed to stop aspirin, nsaids, rheumatologic medications and blood thinners. They have also been instructed to continue on any heart and bp meds and to take them the morning of surgery with sips of water. The patient will require in patient admission due to above stated medical conditions as well the the surgical challenges given the anatomy and bone quality. Delgado Moeller

## 2018-12-28 NOTE — H&P (VIEW-ONLY)
[address] [place] [address], [address] [place] [id number] 
[phone] HISTORY & PHYSICAL Patient: Sherrine Bumpers                MRN: [id number]       SSN: [id number] YOB: 1950        AGE: 77 y.o. SEX: male Body mass index is 27.7 kg/m². PCP: Alison Pérez MD 
12/28/18 CC: Left  shoulder end stage OA Problem List Items Addressed This Visit None Visit Diagnoses Primary osteoarthritis of left shoulder    -  Primary Pre-op exam      
  
 
 
 
HPI:  The patient is a pleasant 77 y.o. whom has end stage OA of their Left shoulder and has failed conservative treatment including but not limited to NSAIDS, cortisone injections, viscosupplementation, PT, and pain medicine. Due to the current findings and affected activities of daily living, surgical intervention is indicated. The alternatives, risks, complications, as well as expected outcome were discussed. These include but are not limited to infection, blood loss, need for blood transfusion, neurovascular damage, DVT, PE,  post-op stiffness and pain, leg length discrepancy, dislocation, anesthetic complications, prothesis longevity, need for more surgery, MI, stroke, and even death. The patient understands and wishes to proceed with surgery. Past Medical History:  
Diagnosis Date  Hypercholesterolemia  Hypertension Current Outpatient Medications:  
  tamsulosin (FLOMAX) 0.4 mg capsule, Take 0.4 mg by mouth daily (after dinner). , Disp: , Rfl:  
  cyanocobalamin 1,000 mcg tablet, Take 1,000 mcg by mouth daily. , Disp: , Rfl:  
  aspirin 81 mg chewable tablet, Take 81 mg by mouth daily. , Disp: , Rfl:  
  brimonidine (ALPHAGAN P) 0.1 % ophthalmic solution, Administer 1 Drop to both eyes two (2) times a day., Disp: , Rfl:  
  acetaminophen (TYLENOL) 325 mg tablet, Take 2 Tabs by mouth every six (6) hours as needed for Pain., Disp: 60 Tab, Rfl: 0 
  CINNAMON BARK (CINNAMON PO), Take  by mouth two (2) times a day., Disp: , Rfl:  
  CALCIUM PO, Take  by mouth daily. , Disp: , Rfl:  
  lycopene 10 mg cap, Take  by mouth., Disp: , Rfl:  
  lutein 40 mg cap, Take  by mouth., Disp: , Rfl:  
  omega-3 fatty acids-vitamin e (FISH OIL) 1,000 mg cap, Take 1 Cap by mouth., Disp: , Rfl:  
  coenzyme q10-vitamin e (COQ10 ) 100-100 mg-unit cap, Take  by mouth., Disp: , Rfl:  
  garlic cap, Take  by mouth., Disp: , Rfl:  
  rosuvastatin (CRESTOR) 10 mg tablet, Take 10 mg by mouth nightly.  , Disp: , Rfl:  
  benazepril (LOTENSIN) 5 mg tablet, Take 10 mg by mouth daily. , Disp: , Rfl:  
  saw palmetto 320 mg Cap, Take  by mouth.  , Disp: , Rfl:  
  cholecalciferol, vitamin d3, (VITAMIN D3) 1,000 unit tablet, Take  by mouth daily. , Disp: , Rfl:  
  MULTIVITAMIN WITH MINERALS PO, Take  by mouth.  , Disp: , Rfl:  
 
No Known Allergies Social History Socioeconomic History  Marital status: SINGLE Spouse name: Not on file  Number of children: Not on file  Years of education: Not on file  Highest education level: Not on file Social Needs  Financial resource strain: Not on file  Food insecurity - worry: Not on file  Food insecurity - inability: Not on file  Transportation needs - medical: Not on file  Transportation needs - non-medical: Not on file Occupational History  Not on file Tobacco Use  Smoking status: Never Smoker  Smokeless tobacco: Never Used Substance and Sexual Activity  Alcohol use: Yes Comment: social  
 Drug use: No  
 Sexual activity: Not on file Other Topics Concern  Not on file Social History Narrative  Not on file Past Surgical History:  
Procedure Laterality Date  ENDOSCOPY, COLON, DIAGNOSTIC    
 HX CATARACT REMOVAL    
 HX SHOULDER REPLACEMENT Right 02/2018 Family History:  Non-contributory.   
 
PE: 
 Visit Vitals /79 Pulse 77 Temp 97.4 °F (36.3 °C) (Oral) Resp 18 Ht 5' 8\" (1.727 m) Wt 182 lb 3.2 oz (82.6 kg) SpO2 98% BMI 27.70 kg/m² A&O X3, NAD, well develop, well nourished Heart: S1-S2, rrr 
Lungs: CTA bilat Abd: soft, nt, nt, + bs in all quadrants Ext:  Pos distal pulses to DP, PT 
 
X-ray: left shoulder shows end stage OA Labs: labs, EKG, CXR were reviewed and wnl. A:  Left  shoulder end stage OA 
 
P:  At this point we will move forward with surgery LEFT TOTAL SHOULDER REPLACEMENT. Again, the alternatives, risks, complications, as well as expected outcome were discussed and the patient wishes to proceed with surgery. Pt has been instructed to stop aspirin, nsaids, rheumatologic medications and blood thinners. They have also been instructed to continue on any heart and bp meds and to take them the morning of surgery with sips of water. The patient will require in patient admission due to above stated medical conditions as well the the surgical challenges given the anatomy and bone quality. Barb Hays

## 2019-01-05 ENCOUNTER — ANESTHESIA EVENT (OUTPATIENT)
Dept: SURGERY | Age: 69
DRG: 483 | End: 2019-01-05
Payer: MEDICARE

## 2019-01-07 ENCOUNTER — ANESTHESIA (OUTPATIENT)
Dept: SURGERY | Age: 69
DRG: 483 | End: 2019-01-07
Payer: MEDICARE

## 2019-01-07 ENCOUNTER — HOSPITAL ENCOUNTER (INPATIENT)
Age: 69
LOS: 1 days | Discharge: HOME | DRG: 483 | End: 2019-01-08
Attending: ORTHOPAEDIC SURGERY | Admitting: ORTHOPAEDIC SURGERY
Payer: MEDICARE

## 2019-01-07 ENCOUNTER — APPOINTMENT (OUTPATIENT)
Dept: GENERAL RADIOLOGY | Age: 69
DRG: 483 | End: 2019-01-07
Attending: ORTHOPAEDIC SURGERY
Payer: MEDICARE

## 2019-01-07 DIAGNOSIS — G89.18 POST-OP PAIN: ICD-10-CM

## 2019-01-07 DIAGNOSIS — Z96.612 STATUS POST REPLACEMENT OF LEFT SHOULDER JOINT: Primary | ICD-10-CM

## 2019-01-07 PROBLEM — Z96.619 S/P SHOULDER JOINT REPLACEMENT: Status: ACTIVE | Noted: 2019-01-07

## 2019-01-07 PROCEDURE — 77030012547: Performed by: ORTHOPAEDIC SURGERY

## 2019-01-07 PROCEDURE — 74011250637 HC RX REV CODE- 250/637: Performed by: ORTHOPAEDIC SURGERY

## 2019-01-07 PROCEDURE — 74011000272 HC RX REV CODE- 272: Performed by: ORTHOPAEDIC SURGERY

## 2019-01-07 PROCEDURE — 77030002912 HC SUT ETHBND J&J -A: Performed by: ORTHOPAEDIC SURGERY

## 2019-01-07 PROCEDURE — 77030031139 HC SUT VCRL2 J&J -A: Performed by: ORTHOPAEDIC SURGERY

## 2019-01-07 PROCEDURE — 77030027138 HC INCENT SPIROMETER -A

## 2019-01-07 PROCEDURE — 74011250637 HC RX REV CODE- 250/637: Performed by: NURSE ANESTHETIST, CERTIFIED REGISTERED

## 2019-01-07 PROCEDURE — 77030006835 HC BLD SAW SAG STRY -B: Performed by: ORTHOPAEDIC SURGERY

## 2019-01-07 PROCEDURE — 77030021688 HC BIT DRL QC J&J -B: Performed by: ORTHOPAEDIC SURGERY

## 2019-01-07 PROCEDURE — 74011250636 HC RX REV CODE- 250/636: Performed by: ORTHOPAEDIC SURGERY

## 2019-01-07 PROCEDURE — 77030027792 HC SMRTMX MIN KT J&J -B: Performed by: ORTHOPAEDIC SURGERY

## 2019-01-07 PROCEDURE — 77030018074 HC RTVR SUT ARTH4 S&N -B: Performed by: ORTHOPAEDIC SURGERY

## 2019-01-07 PROCEDURE — 77030003601 HC NDL NRV BLK BBMI -A: Performed by: ORTHOPAEDIC SURGERY

## 2019-01-07 PROCEDURE — 77030032490 HC SLV COMPR SCD KNE COVD -B: Performed by: ORTHOPAEDIC SURGERY

## 2019-01-07 PROCEDURE — 77030037400 HC ADH TISS HI VISC EXOFIN CHMP -B: Performed by: ORTHOPAEDIC SURGERY

## 2019-01-07 PROCEDURE — 74011250636 HC RX REV CODE- 250/636

## 2019-01-07 PROCEDURE — 77030002922 HC SUT FBRWRE ARTH -B: Performed by: ORTHOPAEDIC SURGERY

## 2019-01-07 PROCEDURE — C1713 ANCHOR/SCREW BN/BN,TIS/BN: HCPCS | Performed by: ORTHOPAEDIC SURGERY

## 2019-01-07 PROCEDURE — 77030026091 HC NDL SUT TAPR HMRK -A: Performed by: ORTHOPAEDIC SURGERY

## 2019-01-07 PROCEDURE — 74011000250 HC RX REV CODE- 250: Performed by: ORTHOPAEDIC SURGERY

## 2019-01-07 PROCEDURE — C1776 JOINT DEVICE (IMPLANTABLE): HCPCS | Performed by: ORTHOPAEDIC SURGERY

## 2019-01-07 PROCEDURE — 65270000029 HC RM PRIVATE

## 2019-01-07 PROCEDURE — 77030037878 HC DRSG MEPILEX >48IN BORD MOLN -B: Performed by: ORTHOPAEDIC SURGERY

## 2019-01-07 PROCEDURE — 77030018836 HC SOL IRR NACL ICUM -A: Performed by: ORTHOPAEDIC SURGERY

## 2019-01-07 PROCEDURE — 76210000006 HC OR PH I REC 0.5 TO 1 HR: Performed by: ORTHOPAEDIC SURGERY

## 2019-01-07 PROCEDURE — 76010000132 HC OR TIME 2.5 TO 3 HR: Performed by: ORTHOPAEDIC SURGERY

## 2019-01-07 PROCEDURE — 76060000036 HC ANESTHESIA 2.5 TO 3 HR: Performed by: ORTHOPAEDIC SURGERY

## 2019-01-07 PROCEDURE — 74011250636 HC RX REV CODE- 250/636: Performed by: NURSE ANESTHETIST, CERTIFIED REGISTERED

## 2019-01-07 PROCEDURE — 74011250636 HC RX REV CODE- 250/636: Performed by: ANESTHESIOLOGY

## 2019-01-07 PROCEDURE — 74011000250 HC RX REV CODE- 250

## 2019-01-07 PROCEDURE — 64415 NJX AA&/STRD BRCH PLXS IMG: CPT | Performed by: ANESTHESIOLOGY

## 2019-01-07 PROCEDURE — 77030032497 HC WRP SHLDR WO BGS SOLM -B: Performed by: ORTHOPAEDIC SURGERY

## 2019-01-07 PROCEDURE — 0RRK0JZ REPLACEMENT OF LEFT SHOULDER JOINT WITH SYNTHETIC SUBSTITUTE, OPEN APPROACH: ICD-10-PCS | Performed by: ORTHOPAEDIC SURGERY

## 2019-01-07 PROCEDURE — 76942 ECHO GUIDE FOR BIOPSY: CPT | Performed by: ANESTHESIOLOGY

## 2019-01-07 PROCEDURE — 77030006576 HC BIT DRL QC J&J -C: Performed by: ORTHOPAEDIC SURGERY

## 2019-01-07 PROCEDURE — 77030008683 HC TU ET CUF COVD -A: Performed by: ANESTHESIOLOGY

## 2019-01-07 PROCEDURE — 77030026438 HC STYL ET INTUB CARD -A: Performed by: ANESTHESIOLOGY

## 2019-01-07 PROCEDURE — 77030013708 HC HNDPC SUC IRR PULS STRY –B: Performed by: ORTHOPAEDIC SURGERY

## 2019-01-07 PROCEDURE — 77030012406 HC DRN WND PENRS BARD -A: Performed by: ORTHOPAEDIC SURGERY

## 2019-01-07 PROCEDURE — 97161 PT EVAL LOW COMPLEX 20 MIN: CPT

## 2019-01-07 PROCEDURE — 73020 X-RAY EXAM OF SHOULDER: CPT

## 2019-01-07 PROCEDURE — 77030002933 HC SUT MCRYL J&J -A: Performed by: ORTHOPAEDIC SURGERY

## 2019-01-07 PROCEDURE — 0LS40ZZ REPOSITION LEFT UPPER ARM TENDON, OPEN APPROACH: ICD-10-PCS | Performed by: ORTHOPAEDIC SURGERY

## 2019-01-07 PROCEDURE — 77030013079 HC BLNKT BAIR HGGR 3M -A: Performed by: ANESTHESIOLOGY

## 2019-01-07 DEVICE — COMPONENT GLEN FIX DIA48MM SHLDR XLPE ANCHR PEG FOR: Type: IMPLANTABLE DEVICE | Site: SHOULDER | Status: FUNCTIONAL

## 2019-01-07 DEVICE — IMPLANTABLE DEVICE: Type: IMPLANTABLE DEVICE | Site: SHOULDER | Status: FUNCTIONAL

## 2019-01-07 DEVICE — CEMENT BNE GENTAMICIN 40 GM HI VISC SINGLE DOSE CMW 1: Type: IMPLANTABLE DEVICE | Site: SHOULDER | Status: FUNCTIONAL

## 2019-01-07 DEVICE — STEM HUM L121MM DIA12MM STD SHLDR PORCOAT FOR PLATFRM: Type: IMPLANTABLE DEVICE | Site: SHOULDER | Status: FUNCTIONAL

## 2019-01-07 DEVICE — COMPONENT TOT SHLDR ARTHRPLSTY TOT: Type: IMPLANTABLE DEVICE | Site: SHOULDER | Status: FUNCTIONAL

## 2019-01-07 RX ORDER — OXYCODONE HYDROCHLORIDE 5 MG/1
10 TABLET ORAL
Status: DISCONTINUED | OUTPATIENT
Start: 2019-01-07 | End: 2019-01-08 | Stop reason: HOSPADM

## 2019-01-07 RX ORDER — SUCCINYLCHOLINE CHLORIDE 20 MG/ML
INJECTION INTRAMUSCULAR; INTRAVENOUS AS NEEDED
Status: DISCONTINUED | OUTPATIENT
Start: 2019-01-07 | End: 2019-01-07 | Stop reason: HOSPADM

## 2019-01-07 RX ORDER — SENNOSIDES 8.6 MG/1
1 TABLET ORAL 2 TIMES DAILY
Status: DISCONTINUED | OUTPATIENT
Start: 2019-01-07 | End: 2019-01-08 | Stop reason: HOSPADM

## 2019-01-07 RX ORDER — ONDANSETRON 2 MG/ML
4 INJECTION INTRAMUSCULAR; INTRAVENOUS ONCE
Status: DISCONTINUED | OUTPATIENT
Start: 2019-01-07 | End: 2019-01-07 | Stop reason: HOSPADM

## 2019-01-07 RX ORDER — LIDOCAINE HYDROCHLORIDE 10 MG/ML
2 INJECTION, SOLUTION EPIDURAL; INFILTRATION; INTRACAUDAL; PERINEURAL ONCE
Status: COMPLETED | OUTPATIENT
Start: 2019-01-07 | End: 2019-01-07

## 2019-01-07 RX ORDER — ROPIVACAINE HYDROCHLORIDE 5 MG/ML
30 INJECTION, SOLUTION EPIDURAL; INFILTRATION; PERINEURAL
Status: COMPLETED | OUTPATIENT
Start: 2019-01-07 | End: 2019-01-07

## 2019-01-07 RX ORDER — DEXAMETHASONE SODIUM PHOSPHATE 4 MG/ML
INJECTION, SOLUTION INTRA-ARTICULAR; INTRALESIONAL; INTRAMUSCULAR; INTRAVENOUS; SOFT TISSUE AS NEEDED
Status: DISCONTINUED | OUTPATIENT
Start: 2019-01-07 | End: 2019-01-07 | Stop reason: HOSPADM

## 2019-01-07 RX ORDER — ASPIRIN 325 MG
325 TABLET, DELAYED RELEASE (ENTERIC COATED) ORAL 2 TIMES DAILY
Status: DISCONTINUED | OUTPATIENT
Start: 2019-01-08 | End: 2019-01-08 | Stop reason: HOSPADM

## 2019-01-07 RX ORDER — FENTANYL CITRATE 50 UG/ML
INJECTION, SOLUTION INTRAMUSCULAR; INTRAVENOUS AS NEEDED
Status: DISCONTINUED | OUTPATIENT
Start: 2019-01-07 | End: 2019-01-07 | Stop reason: HOSPADM

## 2019-01-07 RX ORDER — INSULIN LISPRO 100 [IU]/ML
INJECTION, SOLUTION INTRAVENOUS; SUBCUTANEOUS ONCE
Status: DISCONTINUED | OUTPATIENT
Start: 2019-01-07 | End: 2019-01-07 | Stop reason: HOSPADM

## 2019-01-07 RX ORDER — ROSUVASTATIN CALCIUM 10 MG/1
10 TABLET, COATED ORAL
Status: DISCONTINUED | OUTPATIENT
Start: 2019-01-07 | End: 2019-01-08 | Stop reason: HOSPADM

## 2019-01-07 RX ORDER — LIDOCAINE HYDROCHLORIDE 20 MG/ML
INJECTION, SOLUTION EPIDURAL; INFILTRATION; INTRACAUDAL; PERINEURAL AS NEEDED
Status: DISCONTINUED | OUTPATIENT
Start: 2019-01-07 | End: 2019-01-07 | Stop reason: HOSPADM

## 2019-01-07 RX ORDER — FAMOTIDINE 20 MG/1
20 TABLET, FILM COATED ORAL ONCE
Status: COMPLETED | OUTPATIENT
Start: 2019-01-07 | End: 2019-01-07

## 2019-01-07 RX ORDER — SODIUM CHLORIDE 0.9 % (FLUSH) 0.9 %
5-10 SYRINGE (ML) INJECTION EVERY 8 HOURS
Status: DISCONTINUED | OUTPATIENT
Start: 2019-01-07 | End: 2019-01-08 | Stop reason: HOSPADM

## 2019-01-07 RX ORDER — UBIDECARENONE 30 MG
30 CAPSULE ORAL DAILY
Status: DISCONTINUED | OUTPATIENT
Start: 2019-01-08 | End: 2019-01-07 | Stop reason: SDUPTHER

## 2019-01-07 RX ORDER — MELATONIN
1000 DAILY
Status: DISCONTINUED | OUTPATIENT
Start: 2019-01-07 | End: 2019-01-08 | Stop reason: HOSPADM

## 2019-01-07 RX ORDER — ROPIVACAINE HYDROCHLORIDE 5 MG/ML
INJECTION, SOLUTION EPIDURAL; INFILTRATION; PERINEURAL
Status: COMPLETED | OUTPATIENT
Start: 2019-01-07 | End: 2019-01-07

## 2019-01-07 RX ORDER — HYDROMORPHONE HYDROCHLORIDE 2 MG/ML
0.5 INJECTION, SOLUTION INTRAMUSCULAR; INTRAVENOUS; SUBCUTANEOUS
Status: DISCONTINUED | OUTPATIENT
Start: 2019-01-07 | End: 2019-01-07 | Stop reason: HOSPADM

## 2019-01-07 RX ORDER — FENTANYL CITRATE 50 UG/ML
INJECTION, SOLUTION INTRAMUSCULAR; INTRAVENOUS
Status: COMPLETED | OUTPATIENT
Start: 2019-01-07 | End: 2019-01-07

## 2019-01-07 RX ORDER — SODIUM CHLORIDE 0.9 % (FLUSH) 0.9 %
5-40 SYRINGE (ML) INJECTION EVERY 8 HOURS
Status: DISCONTINUED | OUTPATIENT
Start: 2019-01-07 | End: 2019-01-07 | Stop reason: HOSPADM

## 2019-01-07 RX ORDER — TAMSULOSIN HYDROCHLORIDE 0.4 MG/1
0.4 CAPSULE ORAL
Status: DISCONTINUED | OUTPATIENT
Start: 2019-01-07 | End: 2019-01-08 | Stop reason: HOSPADM

## 2019-01-07 RX ORDER — LISINOPRIL 10 MG/1
10 TABLET ORAL DAILY
Status: DISCONTINUED | OUTPATIENT
Start: 2019-01-07 | End: 2019-01-08 | Stop reason: HOSPADM

## 2019-01-07 RX ORDER — SODIUM CHLORIDE, SODIUM LACTATE, POTASSIUM CHLORIDE, CALCIUM CHLORIDE 600; 310; 30; 20 MG/100ML; MG/100ML; MG/100ML; MG/100ML
25 INJECTION, SOLUTION INTRAVENOUS CONTINUOUS
Status: DISCONTINUED | OUTPATIENT
Start: 2019-01-07 | End: 2019-01-07 | Stop reason: HOSPADM

## 2019-01-07 RX ORDER — CELECOXIB 100 MG/1
200 CAPSULE ORAL 2 TIMES DAILY
Status: DISCONTINUED | OUTPATIENT
Start: 2019-01-07 | End: 2019-01-08 | Stop reason: HOSPADM

## 2019-01-07 RX ORDER — MIDAZOLAM HYDROCHLORIDE 1 MG/ML
INJECTION, SOLUTION INTRAMUSCULAR; INTRAVENOUS
Status: COMPLETED | OUTPATIENT
Start: 2019-01-07 | End: 2019-01-07

## 2019-01-07 RX ORDER — DEXTROSE 50 % IN WATER (D50W) INTRAVENOUS SYRINGE
25-50 AS NEEDED
Status: DISCONTINUED | OUTPATIENT
Start: 2019-01-07 | End: 2019-01-07 | Stop reason: HOSPADM

## 2019-01-07 RX ORDER — SODIUM CHLORIDE 0.9 % (FLUSH) 0.9 %
5-40 SYRINGE (ML) INJECTION AS NEEDED
Status: DISCONTINUED | OUTPATIENT
Start: 2019-01-07 | End: 2019-01-07 | Stop reason: HOSPADM

## 2019-01-07 RX ORDER — GLUCOSAM/CHONDRO/HERB 149/HYAL 750-100 MG
1 TABLET ORAL DAILY
Status: DISCONTINUED | OUTPATIENT
Start: 2019-01-07 | End: 2019-01-08 | Stop reason: HOSPADM

## 2019-01-07 RX ORDER — CEFAZOLIN SODIUM 2 G/50ML
2 SOLUTION INTRAVENOUS ONCE
Status: COMPLETED | OUTPATIENT
Start: 2019-01-07 | End: 2019-01-07

## 2019-01-07 RX ORDER — ACETAMINOPHEN 500 MG
1000 TABLET ORAL EVERY 8 HOURS
Status: COMPLETED | OUTPATIENT
Start: 2019-01-07 | End: 2019-01-08

## 2019-01-07 RX ORDER — NEOSTIGMINE METHYLSULFATE 5 MG/5 ML
SYRINGE (ML) INTRAVENOUS AS NEEDED
Status: DISCONTINUED | OUTPATIENT
Start: 2019-01-07 | End: 2019-01-07 | Stop reason: HOSPADM

## 2019-01-07 RX ORDER — DIPHENHYDRAMINE HCL 25 MG
25 CAPSULE ORAL
Status: DISCONTINUED | OUTPATIENT
Start: 2019-01-07 | End: 2019-01-08 | Stop reason: HOSPADM

## 2019-01-07 RX ORDER — MIDAZOLAM HYDROCHLORIDE 1 MG/ML
2 INJECTION, SOLUTION INTRAMUSCULAR; INTRAVENOUS ONCE
Status: COMPLETED | OUTPATIENT
Start: 2019-01-07 | End: 2019-01-07

## 2019-01-07 RX ORDER — FENTANYL CITRATE 50 UG/ML
100 INJECTION, SOLUTION INTRAMUSCULAR; INTRAVENOUS ONCE
Status: COMPLETED | OUTPATIENT
Start: 2019-01-07 | End: 2019-01-07

## 2019-01-07 RX ORDER — ROCURONIUM BROMIDE 10 MG/ML
INJECTION, SOLUTION INTRAVENOUS AS NEEDED
Status: DISCONTINUED | OUTPATIENT
Start: 2019-01-07 | End: 2019-01-07 | Stop reason: HOSPADM

## 2019-01-07 RX ORDER — PROPOFOL 10 MG/ML
INJECTION, EMULSION INTRAVENOUS AS NEEDED
Status: DISCONTINUED | OUTPATIENT
Start: 2019-01-07 | End: 2019-01-07 | Stop reason: HOSPADM

## 2019-01-07 RX ORDER — BRIMONIDINE TARTRATE 2 MG/ML
1 SOLUTION/ DROPS OPHTHALMIC 2 TIMES DAILY
Status: DISCONTINUED | OUTPATIENT
Start: 2019-01-07 | End: 2019-01-08 | Stop reason: HOSPADM

## 2019-01-07 RX ORDER — SODIUM CHLORIDE 0.9 % (FLUSH) 0.9 %
5-10 SYRINGE (ML) INJECTION AS NEEDED
Status: DISCONTINUED | OUTPATIENT
Start: 2019-01-07 | End: 2019-01-08 | Stop reason: HOSPADM

## 2019-01-07 RX ORDER — TRAMADOL HYDROCHLORIDE 50 MG/1
50 TABLET ORAL EVERY 8 HOURS
Status: DISCONTINUED | OUTPATIENT
Start: 2019-01-07 | End: 2019-01-08 | Stop reason: HOSPADM

## 2019-01-07 RX ORDER — ONDANSETRON 2 MG/ML
INJECTION INTRAMUSCULAR; INTRAVENOUS AS NEEDED
Status: DISCONTINUED | OUTPATIENT
Start: 2019-01-07 | End: 2019-01-07 | Stop reason: HOSPADM

## 2019-01-07 RX ORDER — FENTANYL CITRATE 50 UG/ML
25 INJECTION, SOLUTION INTRAMUSCULAR; INTRAVENOUS
Status: DISCONTINUED | OUTPATIENT
Start: 2019-01-07 | End: 2019-01-07 | Stop reason: HOSPADM

## 2019-01-07 RX ORDER — LANOLIN ALCOHOL/MO/W.PET/CERES
1000 CREAM (GRAM) TOPICAL DAILY
Status: DISCONTINUED | OUTPATIENT
Start: 2019-01-07 | End: 2019-01-08 | Stop reason: HOSPADM

## 2019-01-07 RX ORDER — CEFAZOLIN SODIUM 2 G/50ML
2 SOLUTION INTRAVENOUS EVERY 8 HOURS
Status: COMPLETED | OUTPATIENT
Start: 2019-01-07 | End: 2019-01-08

## 2019-01-07 RX ORDER — NALOXONE HYDROCHLORIDE 0.4 MG/ML
0.4 INJECTION, SOLUTION INTRAMUSCULAR; INTRAVENOUS; SUBCUTANEOUS AS NEEDED
Status: DISCONTINUED | OUTPATIENT
Start: 2019-01-07 | End: 2019-01-08 | Stop reason: HOSPADM

## 2019-01-07 RX ORDER — CHOLECALCIFEROL (VITAMIN D3) 125 MCG
100 CAPSULE ORAL DAILY
Status: DISCONTINUED | OUTPATIENT
Start: 2019-01-08 | End: 2019-01-08 | Stop reason: HOSPADM

## 2019-01-07 RX ORDER — GLYCOPYRROLATE 0.2 MG/ML
INJECTION INTRAMUSCULAR; INTRAVENOUS AS NEEDED
Status: DISCONTINUED | OUTPATIENT
Start: 2019-01-07 | End: 2019-01-07 | Stop reason: HOSPADM

## 2019-01-07 RX ORDER — MAGNESIUM SULFATE 100 %
4 CRYSTALS MISCELLANEOUS AS NEEDED
Status: DISCONTINUED | OUTPATIENT
Start: 2019-01-07 | End: 2019-01-07 | Stop reason: HOSPADM

## 2019-01-07 RX ORDER — MORPHINE SULFATE 2 MG/ML
2 INJECTION, SOLUTION INTRAMUSCULAR; INTRAVENOUS
Status: DISCONTINUED | OUTPATIENT
Start: 2019-01-07 | End: 2019-01-08 | Stop reason: HOSPADM

## 2019-01-07 RX ORDER — EPHEDRINE SULFATE/0.9% NACL/PF 25 MG/5 ML
SYRINGE (ML) INTRAVENOUS AS NEEDED
Status: DISCONTINUED | OUTPATIENT
Start: 2019-01-07 | End: 2019-01-07 | Stop reason: HOSPADM

## 2019-01-07 RX ORDER — ONDANSETRON 2 MG/ML
4 INJECTION INTRAMUSCULAR; INTRAVENOUS
Status: DISCONTINUED | OUTPATIENT
Start: 2019-01-07 | End: 2019-01-08 | Stop reason: HOSPADM

## 2019-01-07 RX ORDER — PHENYLEPHRINE HCL IN 0.9% NACL 1 MG/10 ML
SYRINGE (ML) INTRAVENOUS AS NEEDED
Status: DISCONTINUED | OUTPATIENT
Start: 2019-01-07 | End: 2019-01-07 | Stop reason: HOSPADM

## 2019-01-07 RX ADMIN — MIDAZOLAM HYDROCHLORIDE 2 MG: 1 INJECTION, SOLUTION INTRAMUSCULAR; INTRAVENOUS at 07:09

## 2019-01-07 RX ADMIN — Medication 100 MCG: at 08:24

## 2019-01-07 RX ADMIN — FENTANYL CITRATE 50 MCG: 50 INJECTION, SOLUTION INTRAMUSCULAR; INTRAVENOUS at 10:08

## 2019-01-07 RX ADMIN — ACETAMINOPHEN 1000 MG: 500 TABLET ORAL at 13:20

## 2019-01-07 RX ADMIN — ROPIVACAINE HYDROCHLORIDE 150 MG: 5 INJECTION, SOLUTION EPIDURAL; INFILTRATION; PERINEURAL at 07:03

## 2019-01-07 RX ADMIN — PROPOFOL 160 MG: 10 INJECTION, EMULSION INTRAVENOUS at 07:34

## 2019-01-07 RX ADMIN — FENTANYL CITRATE 50 MCG: 50 INJECTION, SOLUTION INTRAMUSCULAR; INTRAVENOUS at 09:26

## 2019-01-07 RX ADMIN — Medication 5 MG: at 07:40

## 2019-01-07 RX ADMIN — Medication 100 MCG: at 08:48

## 2019-01-07 RX ADMIN — LIDOCAINE HYDROCHLORIDE 2 ML: 10 INJECTION, SOLUTION EPIDURAL; INFILTRATION; INTRACAUDAL; PERINEURAL at 07:02

## 2019-01-07 RX ADMIN — Medication 4 MG: at 09:50

## 2019-01-07 RX ADMIN — ROPIVACAINE HYDROCHLORIDE 10 ML: 5 INJECTION, SOLUTION EPIDURAL; INFILTRATION; PERINEURAL at 07:09

## 2019-01-07 RX ADMIN — SENNOSIDES 8.6 MG: 8.6 TABLET, FILM COATED ORAL at 11:57

## 2019-01-07 RX ADMIN — VITAMIN D, TAB 1000IU (100/BT) 1000 UNITS: 25 TAB at 11:58

## 2019-01-07 RX ADMIN — FAMOTIDINE 20 MG: 20 TABLET ORAL at 06:49

## 2019-01-07 RX ADMIN — OMEGA-3 FATTY ACIDS CAP 1000 MG 1 CAPSULE: 1000 CAP at 11:58

## 2019-01-07 RX ADMIN — SUCCINYLCHOLINE CHLORIDE 120 MG: 20 INJECTION INTRAMUSCULAR; INTRAVENOUS at 07:34

## 2019-01-07 RX ADMIN — ROSUVASTATIN CALCIUM 10 MG: 10 TABLET, FILM COATED ORAL at 22:30

## 2019-01-07 RX ADMIN — MIDAZOLAM HYDROCHLORIDE 2 MG: 2 INJECTION, SOLUTION INTRAMUSCULAR; INTRAVENOUS at 07:00

## 2019-01-07 RX ADMIN — CEFAZOLIN SODIUM 2 G: 2 SOLUTION INTRAVENOUS at 07:28

## 2019-01-07 RX ADMIN — TAMSULOSIN HYDROCHLORIDE 0.4 MG: 0.4 CAPSULE ORAL at 17:30

## 2019-01-07 RX ADMIN — TRAMADOL HYDROCHLORIDE 50 MG: 50 TABLET, FILM COATED ORAL at 13:20

## 2019-01-07 RX ADMIN — DEXAMETHASONE SODIUM PHOSPHATE 4 MG: 4 INJECTION, SOLUTION INTRA-ARTICULAR; INTRALESIONAL; INTRAMUSCULAR; INTRAVENOUS; SOFT TISSUE at 07:54

## 2019-01-07 RX ADMIN — ONDANSETRON 4 MG: 2 INJECTION INTRAMUSCULAR; INTRAVENOUS at 09:48

## 2019-01-07 RX ADMIN — TRAMADOL HYDROCHLORIDE 50 MG: 50 TABLET, FILM COATED ORAL at 22:30

## 2019-01-07 RX ADMIN — Medication 100 MCG: at 08:07

## 2019-01-07 RX ADMIN — LIDOCAINE HYDROCHLORIDE 40 MG: 20 INJECTION, SOLUTION EPIDURAL; INFILTRATION; INTRACAUDAL; PERINEURAL at 07:34

## 2019-01-07 RX ADMIN — CYANOCOBALAMIN TAB 1000 MCG 1000 MCG: 1000 TAB at 11:57

## 2019-01-07 RX ADMIN — Medication 100 MCG: at 08:05

## 2019-01-07 RX ADMIN — FENTANYL CITRATE 50 MCG: 50 INJECTION, SOLUTION INTRAMUSCULAR; INTRAVENOUS at 08:37

## 2019-01-07 RX ADMIN — SODIUM CHLORIDE, SODIUM LACTATE, POTASSIUM CHLORIDE, AND CALCIUM CHLORIDE 25 ML/HR: 600; 310; 30; 20 INJECTION, SOLUTION INTRAVENOUS at 06:49

## 2019-01-07 RX ADMIN — ROCURONIUM BROMIDE 10 MG: 10 INJECTION, SOLUTION INTRAVENOUS at 08:54

## 2019-01-07 RX ADMIN — GLYCOPYRROLATE 0.8 MG: 0.2 INJECTION INTRAMUSCULAR; INTRAVENOUS at 09:50

## 2019-01-07 RX ADMIN — ACETAMINOPHEN 1000 MG: 500 TABLET ORAL at 22:30

## 2019-01-07 RX ADMIN — Medication 5 MG: at 07:54

## 2019-01-07 RX ADMIN — Medication 5 MG: at 08:13

## 2019-01-07 RX ADMIN — SODIUM CHLORIDE, SODIUM LACTATE, POTASSIUM CHLORIDE, AND CALCIUM CHLORIDE: 600; 310; 30; 20 INJECTION, SOLUTION INTRAVENOUS at 08:58

## 2019-01-07 RX ADMIN — SENNOSIDES 8.6 MG: 8.6 TABLET, FILM COATED ORAL at 17:31

## 2019-01-07 RX ADMIN — CEFAZOLIN SODIUM 2 G: 2 SOLUTION INTRAVENOUS at 15:28

## 2019-01-07 RX ADMIN — CELECOXIB 200 MG: 100 CAPSULE ORAL at 17:30

## 2019-01-07 RX ADMIN — ROCURONIUM BROMIDE 10 MG: 10 INJECTION, SOLUTION INTRAVENOUS at 09:20

## 2019-01-07 RX ADMIN — ROCURONIUM BROMIDE 40 MG: 10 INJECTION, SOLUTION INTRAVENOUS at 07:50

## 2019-01-07 RX ADMIN — FENTANYL CITRATE 50 MCG: 50 INJECTION, SOLUTION INTRAMUSCULAR; INTRAVENOUS at 07:09

## 2019-01-07 RX ADMIN — MULTIPLE VITAMINS W/ MINERALS TAB 1 TABLET: TAB at 11:58

## 2019-01-07 RX ADMIN — FENTANYL CITRATE 50 MCG: 50 INJECTION INTRAMUSCULAR; INTRAVENOUS at 07:00

## 2019-01-07 RX ADMIN — Medication 5 MG: at 08:08

## 2019-01-07 NOTE — PROGRESS NOTES
Problem: Mobility Impaired (Adult and Pediatric) Goal: *Acute Goals and Plan of Care (Insert Text) physical Therapy EVALUATION & Discharge Patient: Zara Monet (34 y.o. male) Date: 1/7/2019 Primary Diagnosis: M19.012 LEFT SHOULDER DEGENERATIVE JOINT DISEASE 
S/P shoulder joint replacement Procedure(s) (LRB): LEFT TOTAL SHOULDER REPLACEMENT/BICEPS TENDONESIS DEPUY/SPYDER/TMAX/2 SA'S/NERVE BLOCK (Left) Day of Surgery Precautions: Left UE in sling, no active shoulder movement ASSESSMENT AND RECOMMENDATIONS: 
Based on the objective data described below, the patient presents at independent level with ambulation and transfer without an assistive device including ascending/descending [facility] therapy is not indicated at this time. Discharge Recommendations: Outpatient Further Equipment Recommendations for Discharge: N/A   
 
G-CODES:  
 
Mobility  Current  CI= 1-19%   Goal  CI= 1-19%  D/C  CI= 1-19%. The severity rating is based on the Level of Assistance required for Functional Mobility and ADLs. Eval Complexity: History: MEDIUM  Complexity : 1-2 comorbidities / personal factors will impact the outcome/ POC Exam:LOW Complexity : 1-2 Standardized tests and measures addressing body structure, function, activity limitation and / or participation in recreation  Presentation: LOW Complexity : Stable, uncomplicated  Clinical Decision Making:Low Complexity , Overall Complexity:LOW SUBJECTIVE:  
Patient stated I feel really good.  OBJECTIVE DATA SUMMARY:  
 
Past Medical History:  
Diagnosis Date  Hypercholesterolemia  Hypertension Past Surgical History:  
Procedure Laterality Date  ENDOSCOPY, COLON, DIAGNOSTIC    
 HX CATARACT REMOVAL    
 HX SHOULDER REPLACEMENT Right 02/2018  HX SHOULDER REPLACEMENT Left 01/07/2019  
 left shoulder replacement Barriers to Learning/Limitations: None Compensate with: visual, verbal, tactile, kinesthetic cues/model Prior Level of Function/Home Situation: independent with mobility without an assistive device; patient plans to sleep in lift chair during initial recovery Home Situation Home Environment: Private residence # Steps to Enter: 4 One/Two Story Residence: One story Living Alone: Yes Support Systems: Family member(s), Friends \ neighbors Patient Expects to be Discharged to[de-identified] Private residence Current DME Used/Available at Home: NoneCritical Behavior: 
 calm and cooperative, motivated Strength:   
Strength: Within functional limits(B LEs) Tone & Sensation:  
Tone: Normal(B LEs) c/o numbness in left thumb Range Of Motion: 
AROM: Within functional limits(B LEs) Functional Mobility: 
Bed Mobility: 
Supine to Sit: Supervision(with head of bed elevated) Transfers: 
Sit to Stand: Supervision Stand to Sit: Modified independent Balance:  
Sitting: Intact Standing: Intact; Without supportAmbulation/Gait Training: 
Distance (ft): 300 Feet (ft) Assistive Device: (none) Ambulation - Level of Assistance: Independent Pain: 
Pt reports 0/10 pain or discomfort prior to treatment.   
Pt reports 0/10 pain or discomfort post treatment. Activity Tolerance:  
good Please refer to the flowsheet for vital signs taken during this treatment. After treatment:  
[x]         Patient left in no apparent distress sitting up in chair 
[]         Patient left in no apparent distress in bed 
[x]         Call bell left within reach [x]         Nursing notified 
[]         Caregiver present 
[]         Bed alarm activated COMMUNICATION/EDUCATION:  
[x]         Fall prevention education was provided and the patient/caregiver indicated understanding. [x]         Patient/family have participated as able in goal setting and plan of care.-eval only 
[]         Patient/family agree to work toward stated goals and plan of care. []         Patient understands intent and goals of therapy, but is neutral about his/her participation. []         Patient is unable to participate in goal setting and plan of care. Educated patient on transfer techniques and calling for assistance with mobility Thank you for this referral. 
Paris Bowers, PT Time Calculation: 22 mins

## 2019-01-07 NOTE — ROUTINE PROCESS
Neuro check of upper extremities: 
 
 strength unequal: LUE weaker than RUE. Skin: dry, warm, cap refill 3> seconds Movement: able to move 3/5 fingers of left side; all fingers on right Patient denies pain, numbness or tingling at this time.

## 2019-01-07 NOTE — ANESTHESIA POSTPROCEDURE EVALUATION
Procedure(s): LEFT TOTAL SHOULDER REPLACEMENT/BICEPS TENDONESIS DEPUY/SPYDER/TMAX/2 SA'S/NERVE BLOCK. Anesthesia Post Evaluation Multimodal analgesia: multimodal analgesia used between 6 hours prior to anesthesia start to PACU discharge Patient location during evaluation: bedside Patient participation: complete - patient participated Level of consciousness: awake Pain score: 0 Pain management: adequate Airway patency: patent Anesthetic complications: no 
Cardiovascular status: stable Respiratory status: acceptable Hydration status: acceptable Post anesthesia nausea and vomiting:  none Visit Vitals /72 Pulse 86 Temp 36.8 °C (98.3 °F) Resp 14 Ht 5' 8\" (1.727 m) Wt 81.4 kg (179 lb 6.4 oz) SpO2 99% BMI 27.28 kg/m²

## 2019-01-07 NOTE — OP NOTES
[address] [place] [address] Dr  OPERATIVE REPORT    Nj Saxena  MR#: [id number]  : 1950  ACCOUNT #: [de-identified]   DATE OF SERVICE: 2019    PREOPERATIVE DIAGNOSES:    1. Left shoulder osteoarthritis. 2.  Left shoulder biceps tendinopathy. POSTOPERATIVE DIAGNOSES:    1. Left shoulder osteoarthritis. 2.  Left shoulder biceps tendinopathy. PROCEDURES PERFORMED:  1. Left total shoulder arthroplasty, CPT code 40648.  2.  Left shoulder open biceps tenodesis, CPT code 83292. SURGEON:  Ирина Magana MD    ASSISTANT:  Taryn Crockett PA-C    ANESTHESIA:  General with interscalene nerve block. IV FLUIDS:  1600 mL of LR.    ESTIMATED BLOOD LOSS:  150 mL. IMPLANTS:  A [employer] Unite total shoulder arthroplasty with a size 12 stem, a size 52 eccentric humeral head and a size 48 Agate Peg Glenoid. COMPLICATIONS:  None. SPECIMENS REMOVED:  None. INDICATIONS FOR PROCEDURE:  The patient is a 64-year-old male who presented to the office with degenerative arthritis of the left shoulder. After discussing the treatment options, he elected to undergo the procedures as described. DESCRIPTION OF PROCEDURE:  The patient was identified in the preoperative holding area. The left shoulder was marked as the operative extremity after confirmation with the patient. After discussion of risks and benefits of the procedure, informed consent was confirmed. Anesthesia performed an interscalene nerve block in the preoperative holding area. The patient was then taken to the operating room. He was moved from the stretcher to the OR table. General anesthesia was induced and he was intubated. He was brought into the semi-beach chair position. All bony prominences were well padded. The left arm was prepped and draped in normal sterile fashion. A timeout was performed verifying correct  patient, procedure, and operative extremity, with all in agreement.   Antibiotics were given through the IV prior to skin incision. Surgery commenced through an incision from the base of the coracoid to the mid humeral line. This was carried down through the skin and subcutaneous tissue. Subcutaneous bleeders were coagulated. The deltopectoral interval was identified. The cephalic vein was taken medially with the pectoralis major muscle. The conjoined tendon was identified. Retractors were placed. The lateral border of the conjoined tendon was incised and with blunt finger dissection, the undersurface of the conjoined tendon was dissected and the axillary nerve was identified. The retractor was placed deep to the conjoined tendon, taking care to protect the axillary nerve. Retractor was placed superiorly over the humeral head. A large amount of synovitis was noted anteriorly. The biceps tendon was unroofed at the pectoralis and opened through the rotator interval.  The biceps tendon was tenodesed and then cut proximal to the tenodesed to the pec and excised. Osteophytes were removed in the bicipital groove. The subscapularis was tagged. The anterior humeral circumflex vessels were coagulated. A subscapularis peel was performed. The shoulder was then able to be dislocated with external rotation and extension. Osteophytes were removed off of the humeral neck all the way around medially. The opening reamer was then used through the humeral head and it was reamed to a size 12. The cutting guide was placed. The head cut was made in 135 degrees neck shaft angle and 30 degrees of retroversion. Attention was then turned to the glenoid. Retractors were placed. The subscapularis and capsule were released. The capsule was excised. A circumferential excision of the labrum was performed. The guide was then placed for an Blount Peg Glenoid. The guide pin was placed. The reamer was used to ream the subchondral bone. Excess osteophytes were removed. The central drill peg was drilled.   The peg guide was then used to drill the 3 pegs. Thrombin-soaked Gelfoam was placed into the peg holes. A trial glenoid was placed and found to be in good position. This was removed. The cement was pressurized into the peg holes and the implant was seated with bone graft in the central peg. The attention was then turned to the humerus. The humerus was broached for a size 12. Size 12 stem was placed. The eccentric head was placed. The shoulder was reduced. It was noted to have good mobility with a 50% posterior translation with spontaneous reduction. The shoulder was then dislocated. The trial components were removed. FiberWire sutures were placed through drill holes around the lesser tuberosity for subscapularis repair. The wound was irrigated. The implant was impacted into place. The eccentric head was placed. Final osteophytes were removed around the neck as well as any loose bodies in the shoulder joint. The shoulder was reduced and again found to be stable. Subscapularis was repaired with FiberWire sutures through bone tunnels. Another suture was placed in the rotator interval.  The wound was irrigated. The wound was closed in a layered fashion. A sterile dressing was placed about the left shoulder. The arm   was placed into a sling. The patient was awakened from anesthesia and taken to the PACU in stable condition.       MD FREDRICK Medel / CARA  D: 01/07/2019 10:29     T: 01/07/2019 12:25  JOB #: [id number]

## 2019-01-07 NOTE — PROGRESS NOTES
conducted an initial consultation and Spiritual Assessment for Brittnee Onofre, who is a 68 y.o.,male. Patients Primary Language is: [place]. According to the patients EMR Yarsanism Affiliation is: Yusef Harper. The reason the Patient came to the hospital is:  
Patient Active Problem List  
 Diagnosis Date Noted  S/P shoulder joint replacement 01/07/2019  Osteoarthritis of right shoulder 02/08/2018 The  provided the following Interventions: 
Initiated a relationship of care and support. Explored issues of mayi, belief, spirituality and Gnosticism/ritual needs while hospitalized. Listened empathically. Provided chaplaincy education. Provided information about Spiritual Care Services. Chart reviewed. The following outcomes where achieved: 
Patient shared limited information about both their medical narrative and spiritual journey/beliefs.  confirmed Patient's Yarsanism Affiliation. Patient processed feeling about current hospitalization. Patient expressed gratitude for 's visit. Assessment: 
Patient does not have any Gnosticism/cultural needs that will affect patients preferences in health care. There are no spiritual or Gnosticism issues which require intervention at this time. Plan: 
Chaplains will continue to follow and will provide pastoral care on an as needed/requested basis.  recommends bedside caregivers page  on duty if patient shows signs of acute spiritual or emotional distress. Chaplain Marquis Valadez Spiritual Care  
[phone]

## 2019-01-07 NOTE — ANESTHESIA PROCEDURE NOTES
Peripheral Block Start time: 1/7/2019 6:55 AM 
End time: 1/7/2019 7:02 AM 
Performed by: Ravin Pack MD 
Authorized by: Ravin Pack MD  
 
 
Pre-procedure: Indications: at surgeon's request, post-op pain management and procedure for pain Preanesthetic Checklist: patient identified, risks and benefits discussed, site marked, timeout performed, anesthesia consent given and patient being monitored Block Type:  
Block Type:  Brachial plexus Laterality:  Left Monitoring:  Standard ASA monitoring, continuous pulse ox, frequent vital sign checks, oxygen, responsive to questions and heart rate Injection Technique:  Single shot Procedures: ultrasound guided and nerve stimulator Prep: chlorhexidine Needle Type:  Stimuplex Needle Gauge:  22 G Needle Localization:  Ultrasound guidance and nerve stimulator Assessment: 
Number of attempts:  1 Injection Assessment:  No intravascular symptoms, negative aspiration for blood, local visualized surrounding nerve on ultrasound, ultrasound image on chart, no paresthesia and incremental injection every 5 mL Patient tolerance:  Patient tolerated the procedure well with no immediate complications Location:  PREOP HOLDING 
 
 
1/7/2019     7:09 AM     Tracey Mohan MD

## 2019-01-07 NOTE — PROGRESS NOTES
PT orders received and chart was reviewed. Attempted to see patient for PT evaluation however he is currently eating lunch. Will re-attempt evaluation after patient has eaten.   Sami Santoro, PT

## 2019-01-07 NOTE — ANESTHESIA PREPROCEDURE EVALUATION
Anesthetic History No history of anesthetic complications Review of Systems / Medical History Patient summary reviewed and pertinent labs reviewed Pulmonary Within defined limits Neuro/Psych Within defined limits Cardiovascular Within defined limits Hypertension Exercise tolerance: >4 METS 
  
GI/Hepatic/Renal 
Within defined limits Endo/Other Within defined limits Arthritis Other Findings Physical Exam 
 
Airway Mallampati: II 
TM Distance: 4 - 6 cm Neck ROM: normal range of motion Mouth opening: Normal 
 
 Cardiovascular Regular rate and rhythm,  S1 and S2 normal,  no murmur, click, rub, or gallop Rhythm: regular Rate: normal 
 
 
 
 Dental 
 
Dentition: Lower dentition intact, Upper dentition intact and Caps/crowns Comments: Broken crown L lower molar Pulmonary Breath sounds clear to auscultation Abdominal 
GI exam deferred Other Findings Anesthetic Plan ASA: 2 Anesthesia type: general and regional - interscalene block Induction: Intravenous Anesthetic plan and risks discussed with: Patient

## 2019-01-07 NOTE — PERIOP NOTES
TRANSFER - OUT REPORT: 
 
Verbal report given to [id number] Chacorta Puckett RN(name) on Lona Landin  being transferred to [address] [place] [address](unit) for routine post - op Report consisted of patients Situation, Background, Assessment and  
Recommendations(SBAR). Information from the following report(s) SBAR, Kardex, OR Summary, Procedure Summary, Intake/Output, MAR, Recent Results and Med Rec Status was reviewed with the receiving nurse. Lines:  
Peripheral IV 01/07/19 Right Hand (Active) Site Assessment Clean, dry, & intact 1/7/2019 10:16 AM  
Phlebitis Assessment 0 1/7/2019 10:16 AM  
Infiltration Assessment 0 1/7/2019 10:16 AM  
Dressing Status Clean, dry, & intact 1/7/2019 10:16 AM  
Dressing Type Tape;Transparent 1/7/2019 10:16 AM  
Hub Color/Line Status Pink; Infusing 1/7/2019 10:16 AM  
  
 
Opportunity for questions and clarification was provided. Patient transported with: 
 [facility]

## 2019-01-07 NOTE — BRIEF OP NOTE
BRIEF OPERATIVE NOTE Date of Procedure: 1/7/2019 Preoperative Diagnosis: M19.012 LEFT SHOULDER DEGENERATIVE JOINT DISEASE Postoperative Diagnosis: M19.012 LEFT SHOULDER DEGENERATIVE JOINT DISEASE Procedure(s): LEFT TOTAL SHOULDER REPLACEMENT/BICEPS TENDONESIS DEPUY/SPYDER/TMAX/2 SA'S/NERVE BLOCK Surgeon(s) and Role: 
   * Nurys Cedeño MD - Primary Surgical Assistant: Isela Martin Surgical Staff: 
Circ-1: Lorena Arevalo RN Physician Assistant: ANASTACIO Barrett Scrub Tech-1: Sebastian Romero Surg Asst-1: Vicki Plejordan Event Time In Time Out Incision Start 0887 Incision Close 1000 Anesthesia: General  
Estimated Blood Loss: 150 Specimens: * No specimens in log * Findings: Left shoulder arthritis Left shoulder biceps tendinitis Complications: None Implants:  
Implant Name Type Inv. Item Serial No.  Lot No. LRB No. Used Action CEMENT BNE ENDUR 40GM --  - [id number]  CEMENT BNE ENDUR 40GM --   [facility] [employer] [id number] Left 1 Implanted ANCHOR CRSLNK PG GLENOID 48 --  - [id number]  ANCHOR CRSLNK PG GLENOID 48 --   [employer] [id number] Left 1 Implanted BODY PROX HUM MOD 135D SZ 12 -- GLOBAL UNITE - [id number]  BODY PROX HUM MOD 135D SZ 12 -- GLOBAL UNITE  [facility] [employer] [id number] Left 1 Implanted STEM HUM POROUS STD SZ 12 -- Mohit [employer] - [id number]  STEM HUM POROUS STD SZ 12 -- GLOBAL UNITE  [facility] [employer] [id number] Left 1 Implanted HEAD HUM ECC [id number] -- Mohit [employer] - [id number]  HEAD HUM ECC [id number] -- GLOBAL UNITE  J [employer]TH[employer] [id number] Left 1 Implanted

## 2019-01-07 NOTE — PROGRESS NOTES
Reason for Admission:  M19.012 LEFT SHOULDER DEGENERATIVE JOINT DISEASE 
S/P shoulder joint replacement RRAT Score:    12 Plan for utilizing home health:    No orders Likelihood of Readmission:   LOW Transition of Care Plan:         
 
 
Initial assessment completed with patient. Face sheet information confirmed:  Yes. The patient requests we communicate with patient  in reference to medical care. This patient lives alone in a 1 story home with 4 steps to enter. .  Patient was able to navigate steps as needed. Prior to hospitalization, patient was considered to be independent : Yes . Cognitive status of patient:  Alert and oriented to person, place, situation and time. Patient has a current ACP document on file: No 
The patient's friend will be available to transport patient home upon discharge. The patient already has cane available in the home. Patient is not currently active with home health. Patient has not stayed in a skilled nursing facility or rehab. Patient has  outpatient rehab services for 2 years with Dr. Osman Mullen. This patient is on dialysis : No 
Freedom of choice signed: No  
 
Currently, the discharge plan is for home. Care Management Interventions PCP Verified by CM: Yes(per pt, saw pcp 4 weeks ago.) Mode of Transport at Discharge: Self Transition of Care Consult (CM Consult): Discharge Planning MyChart Signup: No 
Discharge Durable Medical Equipment: No 
Physical Therapy Consult: Yes Occupational Therapy Consult: Yes Speech Therapy Consult: No 
Current Support Network: Lives Alone Confirm Follow Up Transport: Friends Plan discussed with Pt/Family/Caregiver: Yes Discharge Location Discharge Placement: Home DWIGHT Romano, RN Pager # [phone] Care Manager

## 2019-01-07 NOTE — INTERVAL H&P NOTE
H&P Update: 
Sherrine Bumpers was seen and examined. History and physical has been reviewed. The patient has been examined.  There have been no significant clinical changes since the completion of the originally dated History and Physical. 
 
Signed By: Yari Ashby MD   
 January 7, 2019 7:11 AM

## 2019-01-08 VITALS
RESPIRATION RATE: 16 BRPM | HEIGHT: 68 IN | OXYGEN SATURATION: 98 % | HEART RATE: 98 BPM | BODY MASS INDEX: 27.19 KG/M2 | SYSTOLIC BLOOD PRESSURE: 91 MMHG | DIASTOLIC BLOOD PRESSURE: 52 MMHG | TEMPERATURE: 97.9 F | WEIGHT: 179.4 LBS

## 2019-01-08 LAB
ANION GAP SERPL CALC-SCNC: 7 MMOL/L (ref 3–18)
BUN SERPL-MCNC: 25 MG/DL (ref 7–18)
BUN/CREAT SERPL: 23 (ref 12–20)
CALCIUM SERPL-MCNC: 8.1 MG/DL (ref 8.5–10.1)
CHLORIDE SERPL-SCNC: 104 MMOL/L (ref 100–108)
CO2 SERPL-SCNC: 26 MMOL/L (ref 21–32)
CREAT SERPL-MCNC: 1.11 MG/DL (ref 0.6–1.3)
ERYTHROCYTE [DISTWIDTH] IN BLOOD BY AUTOMATED COUNT: 13.7 % (ref 11.6–14.5)
GLUCOSE SERPL-MCNC: 125 MG/DL (ref 74–99)
HCT VFR BLD AUTO: 29.5 % (ref 36–48)
HGB BLD-MCNC: 10.1 G/DL (ref 13–16)
MCH RBC QN AUTO: 30.4 PG (ref 24–34)
MCHC RBC AUTO-ENTMCNC: 34.2 G/DL (ref 31–37)
MCV RBC AUTO: 88.9 FL (ref 74–97)
PLATELET # BLD AUTO: 165 K/UL (ref 135–420)
PMV BLD AUTO: 9.4 FL (ref 9.2–11.8)
POTASSIUM SERPL-SCNC: 4.1 MMOL/L (ref 3.5–5.5)
RBC # BLD AUTO: 3.32 M/UL (ref 4.7–5.5)
SODIUM SERPL-SCNC: 137 MMOL/L (ref 136–145)
WBC # BLD AUTO: 7.9 K/UL (ref 4.6–13.2)

## 2019-01-08 PROCEDURE — 97535 SELF CARE MNGMENT TRAINING: CPT

## 2019-01-08 PROCEDURE — 74011250637 HC RX REV CODE- 250/637: Performed by: ORTHOPAEDIC SURGERY

## 2019-01-08 PROCEDURE — 85027 COMPLETE CBC AUTOMATED: CPT

## 2019-01-08 PROCEDURE — 36415 COLL VENOUS BLD VENIPUNCTURE: CPT

## 2019-01-08 PROCEDURE — 74011000250 HC RX REV CODE- 250: Performed by: ORTHOPAEDIC SURGERY

## 2019-01-08 PROCEDURE — 80048 BASIC METABOLIC PNL TOTAL CA: CPT

## 2019-01-08 PROCEDURE — 74011250636 HC RX REV CODE- 250/636: Performed by: ORTHOPAEDIC SURGERY

## 2019-01-08 PROCEDURE — 97165 OT EVAL LOW COMPLEX 30 MIN: CPT

## 2019-01-08 RX ORDER — CELECOXIB 200 MG/1
200 CAPSULE ORAL DAILY
Qty: 30 CAP | Refills: 0 | Status: SHIPPED | OUTPATIENT
Start: 2019-01-08 | End: 2020-01-08

## 2019-01-08 RX ORDER — ASPIRIN 325 MG
325 TABLET, DELAYED RELEASE (ENTERIC COATED) ORAL 2 TIMES DAILY
Qty: 14 TAB | Refills: 0 | Status: SHIPPED | OUTPATIENT
Start: 2019-01-08 | End: 2020-03-12

## 2019-01-08 RX ORDER — OXYCODONE HYDROCHLORIDE 10 MG/1
10 TABLET ORAL
Qty: 60 TAB | Refills: 0 | Status: SHIPPED | OUTPATIENT
Start: 2019-01-08 | End: 2020-01-08

## 2019-01-08 RX ORDER — SENNOSIDES 8.6 MG/1
1 TABLET ORAL 2 TIMES DAILY
Qty: 30 TAB | Refills: 0 | Status: SHIPPED | OUTPATIENT
Start: 2019-01-08 | End: 2020-01-08

## 2019-01-08 RX ADMIN — CELECOXIB 200 MG: 100 CAPSULE ORAL at 08:42

## 2019-01-08 RX ADMIN — TRAMADOL HYDROCHLORIDE 50 MG: 50 TABLET, FILM COATED ORAL at 06:17

## 2019-01-08 RX ADMIN — OMEGA-3 FATTY ACIDS CAP 1000 MG 1 CAPSULE: 1000 CAP at 08:45

## 2019-01-08 RX ADMIN — OXYCODONE HYDROCHLORIDE 10 MG: 5 TABLET ORAL at 03:09

## 2019-01-08 RX ADMIN — CEFAZOLIN SODIUM 2 G: 2 SOLUTION INTRAVENOUS at 01:40

## 2019-01-08 RX ADMIN — LISINOPRIL 10 MG: 10 TABLET ORAL at 08:49

## 2019-01-08 RX ADMIN — VITAMIN D, TAB 1000IU (100/BT) 1000 UNITS: 25 TAB at 08:43

## 2019-01-08 RX ADMIN — Medication 10 ML: at 01:44

## 2019-01-08 RX ADMIN — BRIMONIDINE TARTRATE 1 DROP: 2 SOLUTION/ DROPS OPHTHALMIC at 09:00

## 2019-01-08 RX ADMIN — REGULAR STRENGTH 325 MG: 325 TABLET ORAL at 08:42

## 2019-01-08 RX ADMIN — Medication 100 MG: at 08:43

## 2019-01-08 RX ADMIN — CYANOCOBALAMIN TAB 1000 MCG 1000 MCG: 1000 TAB at 08:44

## 2019-01-08 RX ADMIN — ACETAMINOPHEN 1000 MG: 500 TABLET ORAL at 06:17

## 2019-01-08 RX ADMIN — MULTIPLE VITAMINS W/ MINERALS TAB 1 TABLET: TAB at 08:44

## 2019-01-08 RX ADMIN — SENNOSIDES 8.6 MG: 8.6 TABLET, FILM COATED ORAL at 08:45

## 2019-01-08 NOTE — ROUTINE PROCESS
Bedside shift report given to [facility] Reviewed: · Kardex · SBAR 
· MAR 
· I/Os

## 2019-01-08 NOTE — PROGRESS NOTES
Mobility Intervention:  
 
  [] Pt dangled at edge of bed 
  [] Pt assisted OOB to bedside commode 
  [] Pt assisted OOB to chair 
  [] Pt ambulated to bathroom [x] Patient was ambulated in room/hallway Assistive Device Utilized:  
  
 [] Rolling walker 
 [] Crutches 
 [] Straight Cane 
 [] Knee immobilizer [] IV pole After Mobilization:  
 
[] Patient left in no apparent distress sitting up in chair 
[x] Patient left in no apparent distress in bed 
[x] Call bell left within reach [x] SCDs on & machine turned on 
[x] Ice applied 
[] RN notified 
[] Caregiver present 
[] Bed alarm activated Reason patient not mobilized:  
 
 [] Patient refused 
 [] Nausea/vomiting 
 [] Low blood pressure 
 [] Drowsy/lethargic Pain Rating:  
 
[] 0 [] 1 Assistive Device:       
[] 2 [] 3 [] 4 
[] 5 
[] 6 Assistive Device:       
[] 7 
[] 8 
[] 9 [] 10 Comments:

## 2019-01-08 NOTE — PROGRESS NOTES
Bedside and Verbal shift change report given to Yovana RN/Lashay LOMELI (oncoming nurse) by Rafael Solorio RN 
 (offgoing nurse). Report included the following information SBAR, Kardex, Intake/Output, MAR and Recent Results.

## 2019-01-08 NOTE — DISCHARGE INSTRUCTIONS
Patient Education        Acute Pain After Surgery: Care Instructions  Your Care Instructions    It's common to have some pain after surgery. Pain doesn't mean that something is wrong or that the surgery didn't go well. But when the pain is severe, it's important to work with your doctor to manage it. It's also important to be aware of a few facts about pain and pain medicine. · You are the only person who knows what your pain feels like. So be sure to tell your doctor when you are in pain or when the pain changes. Then he or she will know how to adjust your medicines. · Pain is often easier to control right after it starts. So it may be better to take regular doses of pain medicine and not wait until the pain gets bad. · Medicine can help control pain. But this doesn't mean you'll have no pain. Medicine works to keep the pain at a level you can live with. With time, you will feel better. Follow-up care is a key part of your treatment and safety. Be sure to make and go to all appointments, and call your doctor if you are having problems. It's also a good idea to know your test results and keep a list of the medicines you take. How can you care for yourself at home? · Be safe with medicines. Read and follow all instructions on the label. ? If the doctor gave you a prescription medicine for pain, take it as prescribed. ? If you are not taking a prescription pain medicine, ask your doctor if you can take an over-the-counter medicine. · If you take an over-the-counter pain medicine, such as acetaminophen (Tylenol), ibuprofen (Advil, Motrin), or naproxen (Aleve), read and follow all instructions on the label. · Do not take two or more pain medicines at the same time unless the doctor told you to. · Do not drink alcohol while you are taking pain medicines. · Try to walk each day if your doctor recommends it. Start by walking a little more than you did the day before. Bit by bit, increase the amount you walk. Walking increases blood flow. It also helps prevent pneumonia and constipation. · To prevent constipation from opioid pain medicines:  ? Talk to your doctor about a laxative. ? Include fruits, vegetables, beans, and whole grains in your diet each day. These foods are high in fiber. ? Drink plenty of fluids, enough so that your urine is light yellow or clear like water. Drink water, fruit juice, or other drinks that do not contain caffeine or alcohol. If you have kidney, heart, or liver disease and have to limit fluids, talk with your doctor before you increase the amount of fluids you drink. ? Take a fiber supplement, such as Citrucel or Metamucil, every day if needed. Read and follow all instructions on the label. If you take pain medicine for more than a few days, talk to your doctor before you take fiber. When should you call for help? Call your doctor now or seek immediate medical care if:    · Your pain gets worse.     · Your pain is not controlled by medicine.    Watch closely for changes in your health, and be sure to contact your doctor if you have any problems. Where can you learn more? Go to http://reji-sigifredo.info/. Enter ([phone] 975-995 in the search box to learn more about \"Acute Pain After Surgery: Care Instructions. \"  Current as of: November 20, 2017  Content Version: 11.8  © 2845-7675 [employer]. Care instructions adapted under license by [employer] (which disclaims liability or warranty for this information). If you have questions about a medical condition or this instruction, always ask your healthcare professional. [place] [address] any warranty or liability for your use of this information.          DISCHARGE SUMMARY from Nurse    PATIENT INSTRUCTIONS:    After general anesthesia or intravenous sedation, for 24 hours or while taking prescription Narcotics:  · Limit your activities  · Do not drive and operate hazardous machinery  · Do not make important personal or business decisions  · Do  not drink alcoholic beverages  · If you have not urinated within 8 hours after discharge, please contact your surgeon on call. Report the following to your surgeon:  · Excessive pain, swelling, redness or odor of or around the surgical area  · Temperature over 100.5  · Nausea and vomiting lasting longer than 4 hours or if unable to take medications  · Any signs of decreased circulation or nerve impairment to extremity: change in color, persistent  numbness, tingling, coldness or increase pain  · Any questions    What to do at Home:  Recommended activity: Activity as tolerated, no heavy lifting. No baths. No strenuous activity. If you experience any of the following symptoms: fever, bleeding, pain that doesn't get better with pain medication or ice, numbness or tingling in your left hand or fingers, purulent drainage from incision site, please follow up with Dr. Ina Davies. *  Please give a list of your current medications to your Primary Care Provider. *  Please update this list whenever your medications are discontinued, doses are      changed, or new medications (including over-the-counter products) are added. *  Please carry medication information at all times in case of emergency situations. These are general instructions for a healthy lifestyle:    No smoking/ No tobacco products/ Avoid exposure to second hand smoke  Surgeon General's Warning:  Quitting smoking now greatly reduces serious risk to your health.     Obesity, smoking, and sedentary lifestyle greatly increases your risk for illness    A healthy diet, regular physical exercise & weight monitoring are important for maintaining a healthy lifestyle    You may be retaining fluid if you have a history of heart failure or if you experience any of the following symptoms:  Weight gain of 3 pounds or more overnight or 5 pounds in a week, increased swelling in our hands or feet or shortness of breath while lying flat in bed. Please call your doctor as soon as you notice any of these symptoms; do not wait until your next office visit. Recognize signs and symptoms of STROKE:    F-face looks uneven    A-arms unable to move or move unevenly    S-speech slurred or non-existent    T-time-call 911 as soon as signs and symptoms begin-DO NOT go       Back to bed or wait to see if you get better-TIME IS BRAIN. Warning Signs of HEART ATTACK     Call 911 if you have these symptoms:   Chest discomfort. Most heart attacks involve discomfort in the center of the chest that lasts more than a few minutes, or that goes away and comes back. It can feel like uncomfortable pressure, squeezing, fullness, or pain.  Discomfort in other areas of the upper body. Symptoms can include pain or discomfort in one or both arms, the back, neck, jaw, or stomach.  Shortness of breath with or without chest discomfort.  Other signs may include breaking out in a cold sweat, nausea, or lightheadedness. Don't wait more than five minutes to call 911 - MINUTES MATTER! Fast action can save your life. Calling 911 is almost always the fastest way to get lifesaving treatment. Emergency Medical Services staff can begin treatment when they arrive -- up to an hour sooner than if someone gets to the hospital by car. The discharge information has been reviewed with the patient. The patient verbalized understanding. Discharge medications reviewed with the patient and appropriate educational materials and side effects teaching were provided. ___________________________________________________________________________________________________________________________________    MyChart Activation    Thank you for requesting access to [employer]. Please follow the instructions below to securely access and download your online medical record.  [employer] allows you to send messages to your doctor, view your test results, renew your prescriptions, schedule appointments, and more. How Do I Sign Up? 1. In your internet browser, go to [email or web address]  2. Click on the First Time User? Click Here link in the Sign In box. You will be redirect to the New Member Sign Up page. 3. Enter your [facility] Access Code exactly as it appears below. You will not need to use this code after youve completed the sign-up process. If you do not sign up before the expiration date, you must request a new code. MyChart Access Code: Activation code not generated  Current [facility] Status: Patient Declined (This is the date your [employer] access code will )    4. Enter the last four digits of your Social Security Number (xxxx) and Date of Birth (mm/dd/yyyy) as indicated and click Submit. You will be taken to the next sign-up page. 5. Create a [facility] ID. This will be your [facility] login ID and cannot be changed, so think of one that is secure and easy to remember. 6. Create a [facility] password. You can change your password at any time. 7. Enter your Password Reset Question and Answer. This can be used at a later time if you forget your password. 8. Enter your e-mail address. You will receive e-mail notification when new information is available in [address] 9. Click Sign Up. You can now view and download portions of your medical record. 10. Click the Download Summary menu link to download a portable copy of your medical information. Additional Information    If you have questions, please visit the Frequently Asked Questions section of the [facility] website at [email or web address] Remember, [facility] is NOT to be used for urgent needs. For medical emergencies, dial 911.

## 2019-01-08 NOTE — PROGRESS NOTES
Discharge Planning Discharge order noted for today. Met with pt and are agreeable to the transition plan today. No need identified for CM. Transport has been arranged with a friend. CM will continue to monitor for transitional needs for a safe discharge. DWIGHT Sol, RN Pager # [phone] Care Manager

## 2019-01-08 NOTE — PROGRESS NOTES
Shift Progress note Assumed care of patient in chair, alert and oriented. Patient with increasing pain level requiring roxicodone. Patient able to wiggle all fingers @ present and states he has full sensation. VSS, oob in chair most of evening. Uneventful night. Patient Vitals for the past 12 hrs: 
 Temp Pulse Resp BP SpO2  
01/08/19 0603 97.9 °F (36.6 °C) 81 14 108/66 96 % 01/08/19 0304 97.8 °F (36.6 °C) 78 16 108/70 95 % 01/07/19 2226 98 °F (36.7 °C) 82 18 104/62 96 %

## 2019-01-08 NOTE — PROGRESS NOTES
Problem: Self Care Deficits Care Plan (Adult) Goal: *Acute Goals and Plan of Care (Insert Text) Outcome: Resolved/Met Date Met: 01/08/19 Occupational Therapy EVALUATION/discharge Patient: Jaun Sargent (29 y.o. male) Date: 1/8/2019 Primary Diagnosis: M19.012 LEFT SHOULDER DEGENERATIVE JOINT DISEASE 
S/P shoulder joint replacement Procedure(s) (LRB): LEFT TOTAL SHOULDER REPLACEMENT/BICEPS TENDONESIS DEPUY/SPYDER/TMAX/2 SA'S/NERVE BLOCK (Left) 1 Day Post-Op Precautions:   (Total shoulder) ASSESSMENT AND RECOMMENDATIONS: 
Based on the objective data described below, the patient is able to perform basic self care tasks and functional transfers without assistance given extra time. He practiced donning/doffing his sling and is able to complete task with modified independence. Patient familiar with adaptive dressing strategies from previous shoulder surgery. Reviewed pendulum exercises for the left shoulder and AROM exercises for his left elbow, wrist and hand. Patient verbalized/demonstrated understanding. He has needed DME for bathroom safety. Skilled occupational therapy is not indicated at this time. Discharge Recommendations: Outpatient when appropriate per MD 
Further Equipment Recommendations for Discharge: N/A Barriers to Learning/Limitations: None Compensate with: visual, verbal, tactile, kinesthetic cues/model COMPLEXITY Eval Complexity: History: LOW Complexity : Brief history review ; Examination: LOW Complexity : 1-3 performance deficits relating to physical, cognitive , or psychosocial skils that result in activity limitations and / or participation restrictions ; Decision Making:LOW Complexity : No comorbidities that affect functional and no verbal or physical assistance needed to complete eval tasks  Assessment: Low Complexity G-CODES:  
 
Self Care  Current  CI= 1-19%  Goal  CI= 1-19%  D/C  CI= 1-19%. The severity rating is based on the Level of Assistance required for Functional Mobility and ADLs. SUBJECTIVE:  
Patient stated Mark Smith had my other shoulder done.  OBJECTIVE DATA SUMMARY:  
 
Past Medical History:  
Diagnosis Date  Hypercholesterolemia  Hypertension Past Surgical History:  
Procedure Laterality Date  ENDOSCOPY, COLON, DIAGNOSTIC    
 HX CATARACT REMOVAL    
 HX SHOULDER REPLACEMENT Right 02/2018  HX SHOULDER REPLACEMENT Left 01/07/2019  
 left shoulder replacement Prior Level of Function/Home Situation: Pt was independent with basic self care tasks and functional mobility PTA. Home Situation Home Environment: Private residence # Steps to Enter: 4 One/Two Story Residence: One story Living Alone: Yes Support Systems: Family member(s), Friends \ neighbors Patient Expects to be Discharged to[de-identified] Private residence Current DME Used/Available at Home: None Tub or Shower Type: Tub/Shower combination(with seat/hand held shower) [x]     Right hand dominant   []     Left hand dominantCognitive/Behavioral Status: 
Neurologic State: Alert Orientation Level: Oriented X4 Cognition: Appropriate decision making; Follows commands Safety/Judgement: Awareness of environment; Fall prevention Skin: Intact on UEs Edema: None noted in UEs Vision/Perceptual:   
Acuity: Within Defined Limits(Sees better out of his right eye) Coordination: 
Fine Motor Skills-Upper: Left Intact; Right Intact Gross Motor Skills-Upper: Left Intact; Right Intact Balance: 
Sitting: Intact Standing: Intact; Without support Strength: 
Strength: Within functional limits(UEs; L shoulder NT) Tone & Sensation: 
Tone: Normal(UEs) Sensation: Intact(UEs) Range of Motion: 
AROM: Within functional limits(UEs; L shoulder NT) Functional Mobility and Transfers for ADLs: 
Bed Mobility: 
Supine to Sit: (Pt up in chair upon arrival.) Transfers: Sit to Stand: Modified independent Toilet Transfer : Modified independent ADL Assessment: 
Feeding: Modified independent Oral Facial Hygiene/Grooming: Modified Independent Bathing: Modified independent Upper Body Dressing: Modified independent Lower Body Dressing: Modified independent Toileting: Modified independent ADL Intervention: 
Patient practiced UB/LB dressing while seated and in standing including donning/doffing sling multiple times. No assist given after initial VCs for safety/ease of performance. No LOB noted. Extra time needed for button up shirt. Cognitive Retraining Safety/Judgement: Awareness of environment; Fall prevention Pain: 
Pt reports 2/10 pain or discomfort prior to treatment, in left shoulder.   
Pt reports 2/10 pain or discomfort post treatment, in left shoulder. Activity Tolerance:  
Good Please refer to the flowsheet for vital signs taken during this treatment. After treatment:  
[x]  Patient left in no apparent distress sitting up in chair 
[]  Patient left in no apparent distress in bed 
[x]  Call bell left within reach [x]  Nursing notified 
[]  Caregiver present 
[]  Bed alarm activated COMMUNICATION/EDUCATION:  
Communication/Collaboration: 
[x]      Home safety education was provided and the patient/caregiver indicated understanding. [x]      Patient/family have participated as able and agree with findings and recommendations. []      Patient is unable to participate in plan of care at this time. Adam Dance, MS OTR/L Time Calculation: 47 mins

## 2019-01-08 NOTE — PROGRESS NOTES
[address] Progress Note Patient: Veva Spurling               Sex: male          DOA: 1/7/2019 YOB: 1950      Age:  68 y.o.        LOS:  LOS: 1 day S/P  Procedure(s): LEFT TOTAL SHOULDER REPLACEMENT/BICEPS TENDONESIS DEPUY/SPYDER/TMAX/2 SA'S/NERVE BLOCK Subjective: No complaints Tolerating diet Ambulating Voiding q shift. He is doing well this morning. He has participated in PT and has verbalized readiness to go home today. He has been compliant with his sling. All questions answered this morning. Denies cp, sob, abd pain Objective:  
  
Blood pressure 108/66, pulse 81, temperature 97.9 °F (36.6 °C), resp. rate 14, height 5' 8\" (1.727 m), weight 179 lb 6.4 oz (81.4 kg), SpO2 96 %. Well developed, Well Nourished alert, cooperative, no distress Incision clean, dry, no drainage, dressing in place 
swelling and tenderness noted in left upper extremity (shoulder) Sensory is intact Motor is intact 
nv intact 2+distal pulses Neg calf tenderness Neg for facial asymmetry Labs: 
CBC 
@ 
CBC:  
Lab Results Component Value Date/Time WBC 7.9 01/08/2019 04:54 AM  
 RBC 3.32 (L) 01/08/2019 04:54 AM  
 HGB 10.1 (L) 01/08/2019 04:54 AM  
 HCT 29.5 (L) 01/08/2019 04:54 AM  
 PLATELET 803 40/74/7735 04:54 AM  
@ Coagulation Lab Results Component Value Date INR 1.0 12/28/2018 APTT 30.1 12/28/2018 Basic Metabolic Profile Lab Results Component Value Date  01/08/2019 CO2 26 01/08/2019 BUN 25 (H) 01/08/2019 Medications Reviewed Assessment/Plan Active Problems: S/P shoulder joint replacement (1/7/2019) Procedure(s): LEFT TOTAL SHOULDER REPLACEMENT/BICEPS TENDONESIS DEPUY/SPYDER/TMAX/2 SA'S/NERVE BLOCK :  
 
1. PT and/or OT Consults: YES continue PT/OT: oob to chair, Left wrist/hand/elbow, ROM exercises, No active use of left shoulder, Don/Doff sling teaching 2. Incision Care:  Routine Incision Care and Dressing Changes as necessary: no dressing changes 3. Pain control 4. DVT Prophylaxis - SCD in place, Aspirin 325 mg 
 
DISCHARGE PLANNING  Intervention : Home today Taffy Boeck, PA-C [facility] [phone]

## 2019-01-08 NOTE — ROUTINE PROCESS
Neuro check done.  strength still unequal but Left hand  stronger than before. Able to move 4/5 fingers on the left side and all fingers on the right. Skin is warm, dry, intact (besides incision), and cap refill is less than 3 seconds

## 2019-01-09 ENCOUNTER — TELEPHONE (OUTPATIENT)
Dept: ORTHOPEDIC SURGERY | Facility: CLINIC | Age: 69
End: 2019-01-09

## 2019-01-09 ENCOUNTER — DOCUMENTATION ONLY (OUTPATIENT)
Dept: ORTHOPEDIC SURGERY | Facility: CLINIC | Age: 69
End: 2019-01-09

## 2019-01-09 NOTE — PROGRESS NOTES
Reyna Hargrove from  pharmacy called to report that the rx for oxyCODONE IR (ROXICODONE) 10 mg tab immediate release tablet written on 1/8/19 for 60 tabs was only approved for 42 tabs by patients insurance. Reyna Hargrove can be reached at [phone].

## 2019-01-09 NOTE — TELEPHONE ENCOUNTER
Pt states he had shoulder sx and is now home from the hospital.  States he noticed he has a big round patch (maybe some sort of band-aid) on his lower back/eft buttock and is wondering if he can take that off. Please call pt at [phone].

## 2019-01-10 NOTE — DISCHARGE SUMMARY
[address] [place] [address]    Crista Henriquez  MR#: [id number]  : 1950  ACCOUNT #: [de-identified]   ADMIT DATE: 2019  DISCHARGE DATE: 2019    ADMITTING DIAGNOSIS:  Status post left total shoulder arthroplasty. DISCHARGE DIAGNOSIS:  Status post left total shoulder arthroplasty. PROCEDURE:  Left total shoulder arthroplasty on 2019. DESCRIPTION OF HOSPITAL STAY:  The patient was admitted after undergoing a left total shoulder arthroplasty. On postoperative day 1,  his pain was well controlled and his vitals were stable. He was cleared by physical therapy for discharge home and was discharged on that day. DISCHARGE MEDICATIONS:  Please see medication reconciliation form. DISCHARGE DISPOSITION:  To home. DISCHARGE PLAN:  The patient is discharged home with outpatient followup.       MD FREDRICK Medel/TN  D: 01/10/2019 11:14     T: 01/10/2019 12:17  JOB #: [id number]

## 2019-01-14 ENCOUNTER — TELEPHONE (OUTPATIENT)
Dept: ORTHOPEDIC SURGERY | Facility: CLINIC | Age: 69
End: 2019-01-14

## 2019-01-14 RX ORDER — ASPIRIN 325 MG
325 TABLET, DELAYED RELEASE (ENTERIC COATED) ORAL 2 TIMES DAILY
Qty: 14 TAB | Refills: 0 | Status: CANCELLED | OUTPATIENT
Start: 2019-01-14

## 2019-01-14 NOTE — TELEPHONE ENCOUNTER
Spoke with Mr. Ruth Saleh and per [facility], he is to continue with the aspirin 325 mg for another week. He said he has more so no need for a new script.

## 2019-01-14 NOTE — TELEPHONE ENCOUNTER
Patient states his aspirin 325mg will run out tomorrow. He wants to know if he goes back to taking aspirin 81mg?     Please advise [phone]

## 2019-01-18 ENCOUNTER — OFFICE VISIT (OUTPATIENT)
Dept: ORTHOPEDIC SURGERY | Facility: CLINIC | Age: 69
End: 2019-01-18

## 2019-01-18 VITALS
SYSTOLIC BLOOD PRESSURE: 132 MMHG | WEIGHT: 180 LBS | DIASTOLIC BLOOD PRESSURE: 75 MMHG | RESPIRATION RATE: 16 BRPM | HEIGHT: 68 IN | OXYGEN SATURATION: 99 % | BODY MASS INDEX: 27.28 KG/M2 | TEMPERATURE: 95.8 F | HEART RATE: 88 BPM

## 2019-01-18 DIAGNOSIS — Z96.612 STATUS POST TOTAL SHOULDER ARTHROPLASTY, LEFT: Primary | ICD-10-CM

## 2019-01-18 NOTE — PROGRESS NOTES
Elvia Eli  1950     HISTORY OF PRESENT ILLNESS  Elvia Eli is a 68 y.o. male who presents today for evaluation s/p Left total shoulder arthroplasty on 1/7/19. Patient has not been going to PT. He is doing his home exercises. Describes pain as a 4/10. Still has night pain. Patient denies any fever, chills, chest pain, shortness of breath or calf pain. There are no new illness or injuries to report since last seen in the office. PHYSICAL EXAM:   Visit Vitals  /75 (BP 1 Location: Right arm, BP Patient Position: Sitting)   Pulse 88   Temp 95.8 °F (35.4 °C) (Oral)   Resp 16   Ht 5' 8\" (1.727 m)   Wt 180 lb (81.6 kg)   SpO2 99%   BMI 27.37 kg/m²      The patient is a well-developed, well-nourished male in no acute distress. The patient is alert and oriented times three. The patient appears to be well groomed. Mood and affect are normal.  ORTHOPEDIC EXAM of left shoulder:  Inspection: swelling not present,  Bruising present  Incision, clean, dry, intact, sutures in place  Passive glenohumeral abduction 0-20 degrees, FF 70 degrees, ER 10 degrees  Stability: Stable  Strength: n/a  2+ distal pulses    XR: 2 views of the left shoulder show total shoulder components in good placement. No evidence for loosening or fracture. IMPRESSION:  S/P Left total shoulder arthroplasty    PLAN:   Pt doing well post operatively  Incisions cleaned. Steri strips applied  Patient to continue with PROM exercises given in the hospital. D/C wearing sling. Will order PT PROM left shoulder only, NO AROM until 6 weeks. Stressed to patient that nothing causes an increase in pain.   RTC 3 weeks    Patient seen and evaluated by Dr. Benton Stout today who agrees with treatment plan      [facility]

## 2019-02-08 ENCOUNTER — OFFICE VISIT (OUTPATIENT)
Dept: ORTHOPEDIC SURGERY | Facility: CLINIC | Age: 69
End: 2019-02-08

## 2019-02-08 VITALS
TEMPERATURE: 98.3 F | WEIGHT: 187.4 LBS | RESPIRATION RATE: 18 BRPM | BODY MASS INDEX: 28.4 KG/M2 | HEART RATE: 89 BPM | HEIGHT: 68 IN | SYSTOLIC BLOOD PRESSURE: 135 MMHG | DIASTOLIC BLOOD PRESSURE: 76 MMHG | OXYGEN SATURATION: 97 %

## 2019-02-08 DIAGNOSIS — Z96.612 STATUS POST TOTAL SHOULDER ARTHROPLASTY, LEFT: Primary | ICD-10-CM

## 2019-02-08 RX ORDER — CELECOXIB 200 MG/1
200 CAPSULE ORAL 2 TIMES DAILY WITH MEALS
Qty: 60 CAP | Refills: 1 | Status: SHIPPED | OUTPATIENT
Start: 2019-02-08 | End: 2019-08-16 | Stop reason: SDUPTHER

## 2019-02-08 NOTE — PROGRESS NOTES
Lila Barney 1950 HISTORY OF PRESENT ILLNESS Lila Barney is a 68 y.o. male who presents today for evaluation s/p Left total shoulder arthroplasty on 1/7/19. Patient has been going to PT. He feels it is helping a lot with his ROM. He is doing very well today. He is doing his home exercises. Describes pain as a 4/10. Still has night pain. Patient denies any fever, chills, chest pain, shortness of breath or calf pain. There are no new illness or injuries to report since last seen in the office. PHYSICAL EXAM:  
Visit Vitals /76 Pulse 89 Temp 98.3 °F (36.8 °C) (Oral) Resp 18 Ht 5' 8\" (1.727 m) Wt 187 lb 6.4 oz (85 kg) SpO2 97% BMI 28.49 kg/m² The patient is a well-developed, well-nourished male in no acute distress. The patient is alert and oriented times three. The patient appears to be well groomed. Mood and affect are normal. 
ORTHOPEDIC EXAM of left shoulder: Inspection: swelling not present,  Bruising present Incision, clean, dry, intact, sutures in place Passive glenohumeral abduction 0-80 degrees,  degrees, ER 20 degrees Stability: Stable Strength: n/a 
2+ distal pulses XR: 2 views of the left shoulder show total shoulder components in good placement. No evidence for loosening or fracture. IMPRESSION:  S/P Left total shoulder arthroplasty PLAN:  
Pt doing well post operatively His ROM looks great today. Patient to continue with PROM exercises given in the hospital. Will order PT continue PROM,may start AROM in 2 weeks. Given an order in the office today. Stressed to patient that nothing causes an increase in pain. Pt not given a refill of pain medication Pt given a refill of celebrex today. RTC 4 weeks Patient seen and evaluated by Dr. Rosalina Lainez today who agrees with treatment plan 
 
 
Taffy Boeck, PA-C [facility]

## 2019-03-08 ENCOUNTER — OFFICE VISIT (OUTPATIENT)
Dept: ORTHOPEDIC SURGERY | Facility: CLINIC | Age: 69
End: 2019-03-08

## 2019-03-08 VITALS
HEIGHT: 68 IN | OXYGEN SATURATION: 98 % | HEART RATE: 75 BPM | TEMPERATURE: 96.5 F | BODY MASS INDEX: 28.1 KG/M2 | RESPIRATION RATE: 18 BRPM | SYSTOLIC BLOOD PRESSURE: 145 MMHG | WEIGHT: 185.4 LBS | DIASTOLIC BLOOD PRESSURE: 80 MMHG

## 2019-03-08 DIAGNOSIS — Z96.612 STATUS POST TOTAL SHOULDER ARTHROPLASTY, LEFT: Primary | ICD-10-CM

## 2019-03-08 RX ORDER — PERPHENAZINE 2 MG
TABLET ORAL
COMMUNITY
End: 2020-04-10

## 2019-03-08 NOTE — PROGRESS NOTES
Margo Harris  1950     HISTORY OF PRESENT ILLNESS  Margo Harris is a 68 y.o. male who presents today for evaluation s/p Left total shoulder arthroplasty on 1/7/19. Patient has been going to PT. He feels it is helping a lot with his ROM. He is doing very well today. He is doing his home exercises. Describes pain as a 3/10. Still has some soreness but it is improving. Patient denies any fever, chills, chest pain, shortness of breath or calf pain. There are no new illness or injuries to report since last seen in the office. PHYSICAL EXAM:   Visit Vitals  /80   Pulse 75   Temp 96.5 °F (35.8 °C) (Oral)   Resp 18   Ht 5' 8\" (1.727 m)   Wt 185 lb 6.4 oz (84.1 kg)   SpO2 98%   BMI 28.19 kg/m²      The patient is a well-developed, well-nourished male in no acute distress. The patient is alert and oriented times three. The patient appears to be well groomed. Mood and affect are normal.  ORTHOPEDIC EXAM of left shoulder:  Inspection: swelling not present,  Bruising not present  Incision well healed  Passive glenohumeral abduction 0-90 degrees,  degrees, ER 40 degrees  Stability: Stable  Strength: 4/5 glenohumeral abduction  2+ distal pulses    XR: 2 views of the left shoulder show total shoulder components in good placement. No evidence for loosening or fracture. IMPRESSION:  S/P Left total shoulder arthroplasty    PLAN:   Pt doing well post operatively  His ROM looks great today. Patient to continue with PT. He will work on ROM and begin strengthening.    Pt not given a refill of pain medication  RTC 6 weeks    Patient seen and evaluated by Dr. Mat Carrasquillo today who agrees with treatment plan      Tre [facility] and Spine Specialist

## 2019-03-22 ENCOUNTER — OFFICE VISIT (OUTPATIENT)
Dept: ORTHOPEDIC SURGERY | Facility: CLINIC | Age: 69
End: 2019-03-22

## 2019-03-22 VITALS
TEMPERATURE: 98 F | BODY MASS INDEX: 27.89 KG/M2 | HEART RATE: 83 BPM | DIASTOLIC BLOOD PRESSURE: 69 MMHG | OXYGEN SATURATION: 97 % | RESPIRATION RATE: 18 BRPM | SYSTOLIC BLOOD PRESSURE: 130 MMHG | WEIGHT: 184 LBS | HEIGHT: 68 IN

## 2019-03-22 DIAGNOSIS — Z96.612 STATUS POST TOTAL SHOULDER ARTHROPLASTY, LEFT: Primary | ICD-10-CM

## 2019-03-22 RX ORDER — GABAPENTIN 100 MG/1
CAPSULE ORAL
Refills: 0 | COMMUNITY
Start: 2019-03-19 | End: 2019-08-16 | Stop reason: SDUPTHER

## 2019-03-22 NOTE — PROGRESS NOTES
Lukas Corona 1950 HISTORY OF PRESENT ILLNESS Lukas Corona is a 68 y.o. male who presents today for evaluation s/p Left total shoulder arthroplasty on 1/7/19. Patient has been going to PT. He feels it is helping a lot with his ROM. He is doing very well today. He is doing his home exercises. Describes pain as a 0/10. He comes in today to have his incision looked at. He has a white head with some redness at the distal end of the incision and wanted to make sure it wasn't something concerning. Patient denies any fever, chills, chest pain, shortness of breath or calf pain. There are no new illness or injuries to report since last seen in the office. PHYSICAL EXAM:  
Visit Vitals /69 Pulse 83 Temp 98 °F (36.7 °C) (Oral) Resp 18 Ht 5' 8\" (1.727 m) Wt 184 lb (83.5 kg) SpO2 97% BMI 27.98 kg/m² The patient is a well-developed, well-nourished male in no acute distress. The patient is alert and oriented times three. The patient appears to be well groomed. Mood and affect are normal. 
ORTHOPEDIC EXAM of left shoulder: Inspection: swelling not present,  Bruising not present, small white head, pimple just below the incision with minimal erythema Incision well healed Passive glenohumeral abduction 0-90 degrees,  degrees, ER 40 degrees Stability: Stable Strength: 5/5 glenohumeral abduction 2+ distal pulses XR: 2 views of the left shoulder show total shoulder components in good placement. No evidence for loosening or fracture. IMPRESSION:  S/P Left total shoulder arthroplasty PLAN:  
Pt doing well post operatively His ROM looks great today. The area does not look concerning, it is most likely a folliculitis or pimple. It was expressed in the office today. He will keep the area clean and dry. The area was not directly on the incision so I do not think it is a suture abscess either.  
He does not need to be on ABX for this but if it gets worse he will come back in and see us next week. Patient to continue with PT. He will work on ROM and begin strengthening. Pt not given a refill of pain medication RTC 3 weeks Patient seen and evaluated by Dr. Ina Davies today who agrees with treatment plan 
 
 
Lacho Russell PA-C [address] [place] [address] and Spine Specialist

## 2019-04-23 ENCOUNTER — OFFICE VISIT (OUTPATIENT)
Dept: ORTHOPEDIC SURGERY | Facility: CLINIC | Age: 69
End: 2019-04-23

## 2019-04-23 VITALS
BODY MASS INDEX: 27.98 KG/M2 | HEART RATE: 93 BPM | TEMPERATURE: 97.5 F | SYSTOLIC BLOOD PRESSURE: 106 MMHG | WEIGHT: 184.6 LBS | OXYGEN SATURATION: 95 % | HEIGHT: 68 IN | RESPIRATION RATE: 16 BRPM | DIASTOLIC BLOOD PRESSURE: 71 MMHG

## 2019-04-23 DIAGNOSIS — Z96.612 STATUS POST TOTAL SHOULDER ARTHROPLASTY, LEFT: Primary | ICD-10-CM

## 2019-04-23 NOTE — PROGRESS NOTES
Mg Lin  1950     HISTORY OF PRESENT ILLNESS  Mg Lin is a 68 y.o. male who presents today for evaluation s/p Left total shoulder arthroplasty on 1/7/19. Patient has been going to PT. He feels it is helping a lot with his ROM. He is doing very well today. He is doing his home exercises and working with PT. He would like to continue PT working strengthening. Describes pain as a 0/10. Patient denies any fever, chills, chest pain, shortness of breath or calf pain. There are no new illness or injuries to report since last seen in the office. PHYSICAL EXAM:   Visit Vitals  /71   Pulse 93   Temp 97.5 °F (36.4 °C) (Oral)   Resp 16   Ht 5' 8\" (1.727 m)   Wt 184 lb 9.6 oz (83.7 kg)   SpO2 95%   BMI 28.07 kg/m²      The patient is a well-developed, well-nourished male in no acute distress. The patient is alert and oriented times three. The patient appears to be well groomed. Mood and affect are normal.  ORTHOPEDIC EXAM of left shoulder:  Inspection: swelling not present,  Bruising not present,   Incision well healed  Passive glenohumeral abduction 0-90 degrees,  degrees, ER 40 degrees, Active 180 FF   Stability: Stable  Strength: 5/5 glenohumeral abduction and forward flexion  2+ distal pulses    XR: 2 views of the left shoulder show total shoulder components in good placement. No evidence for loosening or fracture. IMPRESSION:  S/P Left total shoulder arthroplasty    PLAN:   Pt doing well post operatively  His ROM looks great today. Pt to continue PT. Will place another order in a couple weeks when his current order runs out.    Pt not given a refill of pain medication  RTC 6 weeks        MAIDA [facility]um[facility] and Spine Specialist

## 2019-04-24 ENCOUNTER — HOSPITAL ENCOUNTER (OUTPATIENT)
Dept: ULTRASOUND IMAGING | Age: 69
Discharge: HOME | End: 2019-04-24
Attending: INTERNAL MEDICINE
Payer: MEDICARE

## 2019-04-24 DIAGNOSIS — Z13.6 ENCOUNTER FOR ABDOMINAL AORTIC ANEURYSM SCREENING: ICD-10-CM

## 2019-04-24 PROCEDURE — 76706 US ABDL AORTA SCREEN AAA: CPT

## 2019-05-06 ENCOUNTER — TELEPHONE (OUTPATIENT)
Dept: ORTHOPEDIC SURGERY | Facility: CLINIC | Age: 69
End: 2019-05-06

## 2019-05-06 NOTE — TELEPHONE ENCOUNTER
Pt requesting extension for physical therapy to dr Garrett Molina office. Pt last went to [facility] 3/2019.     Please confirm with the patient XL,[phone]

## 2019-05-07 DIAGNOSIS — Z96.611 STATUS POST REPLACEMENT OF BOTH SHOULDER JOINTS: Primary | ICD-10-CM

## 2019-05-07 DIAGNOSIS — Z96.612 STATUS POST REPLACEMENT OF BOTH SHOULDER JOINTS: Primary | ICD-10-CM

## 2019-05-21 ENCOUNTER — OFFICE VISIT (OUTPATIENT)
Dept: ORTHOPEDIC SURGERY | Facility: CLINIC | Age: 69
End: 2019-05-21

## 2019-05-21 VITALS
BODY MASS INDEX: 28.1 KG/M2 | SYSTOLIC BLOOD PRESSURE: 122 MMHG | WEIGHT: 185.4 LBS | OXYGEN SATURATION: 97 % | TEMPERATURE: 97.1 F | RESPIRATION RATE: 18 BRPM | HEART RATE: 79 BPM | DIASTOLIC BLOOD PRESSURE: 78 MMHG | HEIGHT: 68 IN

## 2019-05-21 DIAGNOSIS — Z96.612 STATUS POST TOTAL SHOULDER ARTHROPLASTY, LEFT: Primary | ICD-10-CM

## 2019-05-21 NOTE — PROGRESS NOTES
Ari Menchaca  1950     HISTORY OF PRESENT ILLNESS  Ari Menchaca is a 68 y.o. male who presents today for evaluation s/p Left total shoulder arthroplasty on 1/7/19. Patient has been going to PT. He feels it is helping a lot with his ROM. He is doing very well today. He is doing his home exercises and working with PT. Describes pain as a 2/10. Patient denies any fever, chills, chest pain, shortness of breath or calf pain. There are no new illness or injuries to report since last seen in the office. PHYSICAL EXAM:   Visit Vitals  /78   Pulse 79   Temp 97.1 °F (36.2 °C) (Oral)   Resp 18   Ht 5' 8\" (1.727 m)   Wt 185 lb 6.4 oz (84.1 kg)   SpO2 97%   BMI 28.19 kg/m²      The patient is a well-developed, well-nourished male in no acute distress. The patient is alert and oriented times three. The patient appears to be well groomed. Mood and affect are normal.  ORTHOPEDIC EXAM of left shoulder:  Inspection: swelling not present,  Bruising not present,   Incision well healed  Passive glenohumeral abduction 0-90 degrees,  degrees, ER 60 degrees, Active 180 FF   Stability: Stable  Strength: 5/5 glenohumeral abduction and forward flexion  2+ distal pulses    XR: 2 views of the left shoulder show total shoulder components in good placement. No evidence for loosening or fracture. IMPRESSION:  S/P Left total shoulder arthroplasty    PLAN:   Pt doing well post operatively  His ROM looks great today. Pt to continue PT and transition to an HEP.   Pt not given a refill of pain medication  RTC 8 weeks    Patient seen and evaluated by Dr. Piero Mauro today who agrees with treatment plan    Tre [facility] and Spine Specialist

## 2019-06-25 ENCOUNTER — TELEPHONE (OUTPATIENT)
Dept: ORTHOPEDIC SURGERY | Facility: CLINIC | Age: 69
End: 2019-06-25

## 2019-06-25 DIAGNOSIS — Z96.612 STATUS POST TOTAL SHOULDER ARTHROPLASTY, LEFT: Primary | ICD-10-CM

## 2019-06-25 NOTE — TELEPHONE ENCOUNTER
Patient is requesting another order for Physical Therapy his has one week left on the last order and is still having slight pain but feels that therapy is helping his progression. Patient can be reached at [phone].

## 2019-06-27 ENCOUNTER — DOCUMENTATION ONLY (OUTPATIENT)
Dept: ORTHOPEDIC SURGERY | Facility: CLINIC | Age: 69
End: 2019-06-27

## 2019-06-27 NOTE — PROGRESS NOTES
Patient dropped off a [facility] for to be signed off by Dr. Jhoana Banegas and faxed to [phone]. Patient is also needing his recent meds sent as well. Patient can be reached at [phone].

## 2019-07-01 ENCOUNTER — DOCUMENTATION ONLY (OUTPATIENT)
Dept: ORTHOPEDIC SURGERY | Facility: CLINIC | Age: 69
End: 2019-07-01

## 2019-07-30 ENCOUNTER — OFFICE VISIT (OUTPATIENT)
Dept: ORTHOPEDIC SURGERY | Facility: CLINIC | Age: 69
End: 2019-07-30

## 2019-07-30 VITALS
RESPIRATION RATE: 16 BRPM | DIASTOLIC BLOOD PRESSURE: 77 MMHG | HEIGHT: 68 IN | SYSTOLIC BLOOD PRESSURE: 129 MMHG | HEART RATE: 75 BPM | OXYGEN SATURATION: 96 % | BODY MASS INDEX: 28.34 KG/M2 | WEIGHT: 187 LBS | TEMPERATURE: 97.5 F

## 2019-07-30 DIAGNOSIS — Z96.612 STATUS POST TOTAL SHOULDER ARTHROPLASTY, LEFT: Primary | ICD-10-CM

## 2019-07-30 NOTE — PROGRESS NOTES
uGillermo Orellana  1950     HISTORY OF PRESENT ILLNESS  Guillermo Orellana is a 68 y.o. male who presents today for evaluation s/p Left total shoulder arthroplasty on 1/7/19. Patient has been going to PT. He feels it is helping a lot with his ROM. He is doing very well today. He is doing his home exercises and working with PT. Describes pain as a 2/10. Patient denies any fever, chills, chest pain, shortness of breath or calf pain. There are no new illness or injuries to report since last seen in the office. PHYSICAL EXAM:   Visit Vitals  /77   Pulse 75   Temp 97.5 °F (36.4 °C) (Oral)   Resp 16   Ht 5' 8\" (1.727 m)   Wt 187 lb (84.8 kg)   SpO2 96%   BMI 28.43 kg/m²      The patient is a well-developed, well-nourished male in no acute distress. The patient is alert and oriented times three. The patient appears to be well groomed. Mood and affect are normal.  ORTHOPEDIC EXAM of left shoulder:  Inspection: swelling not present,  Bruising not present,   Incision well healed  Passive glenohumeral abduction 0-90 degrees,  degrees, ER 60 degrees, Active 180 FF   Stability: Stable  Strength: 5/5 glenohumeral abduction and forward flexion  2+ distal pulses    XR: 2 views of the left shoulder show total shoulder components in good placement. No evidence for loosening or fracture. IMPRESSION:  S/P Left total shoulder arthroplasty    PLAN:   Pt doing well post operatively  His ROM looks great today. Pt to continue PT and transition to an HEP.   Pt not given a refill of pain medication  RTC 6 weeks at that point he should be done with PT and we will likely have him return PRN      MAIDA [facility] and Spine Specialist

## 2019-08-16 ENCOUNTER — OFFICE VISIT (OUTPATIENT)
Dept: ORTHOPEDIC SURGERY | Facility: CLINIC | Age: 69
End: 2019-08-16

## 2019-08-16 VITALS
TEMPERATURE: 97.9 F | OXYGEN SATURATION: 93 % | HEART RATE: 81 BPM | SYSTOLIC BLOOD PRESSURE: 120 MMHG | DIASTOLIC BLOOD PRESSURE: 61 MMHG | HEIGHT: 68 IN | BODY MASS INDEX: 28.64 KG/M2 | RESPIRATION RATE: 16 BRPM | WEIGHT: 189 LBS

## 2019-08-16 DIAGNOSIS — S76.311A TEAR OF RIGHT HAMSTRING: Primary | ICD-10-CM

## 2019-08-16 RX ORDER — GABAPENTIN 300 MG/1
300 CAPSULE ORAL 2 TIMES DAILY
COMMUNITY

## 2019-08-16 NOTE — PROGRESS NOTES
1. Have you been to the ER, urgent care clinic since your last visit? Hospitalized since your last visit? Yes, [facility] ED      2. Have you seen or consulted any other health care providers outside of the [address] [place] [address] since your last visit? Include any pap smears or colon screening.  NO

## 2019-08-16 NOTE — PROGRESS NOTES
Patient: Masood Odonnell                MRN: [id number]       SSN: [id number]  YOB: 1950        AGE: 68 y.o. SEX: male  Body mass index is 28.74 kg/m². PCP: Jones Okeefe MD  08/16/19    Chief Complaint: Right leg injury    HISTORY OF PRESENT ILLNESS:  Elvia Estevez returns to the office today for a new complaint. He injured his right leg yesterday while walking in the mall. His leg gave out. He felt severe pain in the back of his leg. He was unable to walk initially. He was seen and had an MRI done, which showed a right hamstring tear. He is here today for followup. Since yesterday, his pain has gotten somewhat better. He is walking with a walker. Past Medical History:   Diagnosis Date    Hypercholesterolemia     Hypertension        Family History   Problem Relation Age of Onset    Cancer Mother     Heart Disease Father        Current Outpatient Medications   Medication Sig Dispense Refill    gabapentin (NEURONTIN) 300 mg capsule Take 300 mg by mouth three (3) times daily.  krill-om-3-dha-epa-phospho-ast (KRILL OIL) [id number] mg cap Take  by mouth.  aspirin delayed-release 325 mg tablet Take 1 Tab by mouth two (2) times a day. (Patient taking differently: Take 81 mg by mouth two (2) times a day.) 14 Tab 0    oxyCODONE IR (ROXICODONE) 10 mg tab immediate release tablet Take 1 Tab by mouth every four (4) hours as needed. Max Daily Amount: 60 mg. 60 Tab 0    senna (SENOKOT) 8.6 mg tablet Take 1 Tab by mouth two (2) times a day. 30 Tab 0    tamsulosin (FLOMAX) 0.4 mg capsule Take 0.4 mg by mouth daily (after dinner).  cyanocobalamin 1,000 mcg tablet Take 1,000 mcg by mouth daily.  brimonidine (ALPHAGAN P) 0.1 % ophthalmic solution Administer 1 Drop to both eyes two (2) times a day.  acetaminophen (TYLENOL) 325 mg tablet Take 2 Tabs by mouth every six (6) hours as needed for Pain.  60 Tab 0    CINNAMON BARK (CINNAMON PO) Take  by mouth two (2) times a day.  CALCIUM PO Take  by mouth daily.  lycopene 10 mg cap Take  by mouth.  lutein 40 mg cap Take  by mouth.  omega-3 fatty acids-vitamin e (FISH OIL) 1,000 mg cap Take 1 Cap by mouth.  coenzyme q10-vitamin e (COQ10 ) 100-100 mg-unit cap Take  by mouth.  garlic cap Take  by mouth.  rosuvastatin (CRESTOR) 10 mg tablet Take 10 mg by mouth nightly.  benazepril (LOTENSIN) 5 mg tablet Take 10 mg by mouth daily.  saw palmetto 320 mg Cap Take  by mouth.  cholecalciferol, vitamin d3, (VITAMIN D3) 1,000 unit tablet Take  by mouth daily.  MULTIVITAMIN WITH MINERALS PO Take  by mouth.  celecoxib (CELEBREX) 200 mg capsule Take 1 Cap by mouth daily.  30 Cap 0       No Known Allergies    Past Surgical History:   Procedure Laterality Date    ENDOSCOPY, COLON, DIAGNOSTIC      HX CATARACT REMOVAL      HX SHOULDER REPLACEMENT Right 02/2018    HX SHOULDER REPLACEMENT Left 01/07/2019    left shoulder replacement        Social History     Socioeconomic History    Marital status: SINGLE     Spouse name: Not on file    Number of children: Not on file    Years of education: Not on file    Highest education level: Not on file   Occupational History    Not on file   Social Needs    Financial resource strain: Not on file    Food insecurity:     Worry: Not on file     Inability: Not on file    Transportation needs:     Medical: Not on file     Non-medical: Not on file   Tobacco Use    Smoking status: Never Smoker    Smokeless tobacco: Never Used   Substance and Sexual Activity    Alcohol use: Yes     Comment: social    Drug use: No    Sexual activity: Not on file   Lifestyle    Physical activity:     Days per week: Not on file     Minutes per session: Not on file    Stress: Not on file   Relationships    Social connections:     Talks on phone: Not on file     Gets together: Not on file     Attends Zoroastrianism service: Not on file Active member of club or organization: Not on file     Attends meetings of clubs or organizations: Not on file     Relationship status: Not on file    Intimate partner violence:     Fear of current or ex partner: Not on file     Emotionally abused: Not on file     Physically abused: Not on file     Forced sexual activity: Not on file   Other Topics Concern    Not on file   Social History Narrative    Not on file       REVIEW OF SYSTEMS:      No changes from previous review of systems unless noted. PHYSICAL EXAMINATION:  Visit Vitals  /61 (BP 1 Location: Left arm, BP Patient Position: Sitting)   Pulse 81   Temp 97.9 °F (36.6 °C) (Oral)   Resp 16   Ht 5' 8\" (1.727 m)   Wt 189 lb (85.7 kg)   SpO2 93%   BMI 28.74 kg/m²     Body mass index is 28.74 kg/m². GENERAL: Alert and oriented x3, in no acute distress. HEENT: Normocephalic, atraumatic. RESP: Non labored breathing. SKIN: No rashes or lesions noted. PHYSICAL EXAM:  Physical exam of the right leg with pain with hamstring stretch and full extension of the knee. He is tender to palpation along the hamstring origin and along the posterior aspect of his leg. He is neurovascularly intact distally. IMAGING:  An MRI was reviewed, which shows a tear of the right hamstring tendon origin. ASSESSMENT AND PLAN:   Lesia Sweeney is a 68year-old male with right hamstring tendon tear. I have recommended nonoperative treatment with ice, stretching and elevation, antiinflammatories and return to activities as tolerated. I did discuss with him that this could take some time to fully heal, but that I do not recommend surgery at this time. I will see him back in a few weeks.               Electronically signed by: Jennifer Moeller MD

## 2019-09-10 ENCOUNTER — OFFICE VISIT (OUTPATIENT)
Dept: ORTHOPEDIC SURGERY | Facility: CLINIC | Age: 69
End: 2019-09-10

## 2019-09-10 VITALS
RESPIRATION RATE: 16 BRPM | OXYGEN SATURATION: 97 % | SYSTOLIC BLOOD PRESSURE: 120 MMHG | HEIGHT: 68 IN | WEIGHT: 190 LBS | BODY MASS INDEX: 28.79 KG/M2 | DIASTOLIC BLOOD PRESSURE: 79 MMHG | TEMPERATURE: 97.6 F | HEART RATE: 84 BPM

## 2019-09-10 DIAGNOSIS — Z96.612 STATUS POST TOTAL SHOULDER ARTHROPLASTY, LEFT: Primary | ICD-10-CM

## 2019-10-21 ENCOUNTER — HOSPITAL ENCOUNTER (OUTPATIENT)
Dept: LAB | Age: 69
Discharge: HOME | End: 2019-10-21
Payer: MEDICARE

## 2019-10-21 DIAGNOSIS — I10 ESSENTIAL HYPERTENSION, MALIGNANT: ICD-10-CM

## 2019-10-21 LAB
ANION GAP SERPL CALC-SCNC: 3 MMOL/L (ref 3–18)
BUN SERPL-MCNC: 22 MG/DL (ref 7–18)
BUN/CREAT SERPL: 21 (ref 12–20)
CALCIUM SERPL-MCNC: 9.6 MG/DL (ref 8.5–10.1)
CHLORIDE SERPL-SCNC: 106 MMOL/L (ref 100–111)
CO2 SERPL-SCNC: 30 MMOL/L (ref 21–32)
CREAT SERPL-MCNC: 1.06 MG/DL (ref 0.6–1.3)
GLUCOSE SERPL-MCNC: 82 MG/DL (ref 74–99)
POTASSIUM SERPL-SCNC: 3.9 MMOL/L (ref 3.5–5.5)
SODIUM SERPL-SCNC: 139 MMOL/L (ref 136–145)

## 2019-10-21 PROCEDURE — 80048 BASIC METABOLIC PNL TOTAL CA: CPT

## 2019-10-21 PROCEDURE — 36415 COLL VENOUS BLD VENIPUNCTURE: CPT

## 2019-10-21 PROCEDURE — 93005 ELECTROCARDIOGRAM TRACING: CPT

## 2019-10-22 LAB
ATRIAL RATE: 71 BPM
CALCULATED P AXIS, ECG09: 63 DEGREES
CALCULATED R AXIS, ECG10: -7 DEGREES
CALCULATED T AXIS, ECG11: 17 DEGREES
DIAGNOSIS, 93000: NORMAL
P-R INTERVAL, ECG05: 136 MS
Q-T INTERVAL, ECG07: 358 MS
QRS DURATION, ECG06: 90 MS
QTC CALCULATION (BEZET), ECG08: 389 MS
VENTRICULAR RATE, ECG03: 71 BPM

## 2020-01-08 ENCOUNTER — HOSPITAL ENCOUNTER (EMERGENCY)
Age: 70
Discharge: HOME | End: 2020-01-08
Attending: EMERGENCY MEDICINE
Payer: MEDICARE

## 2020-01-08 VITALS
WEIGHT: 186 LBS | SYSTOLIC BLOOD PRESSURE: 140 MMHG | HEIGHT: 68 IN | BODY MASS INDEX: 28.19 KG/M2 | OXYGEN SATURATION: 96 % | RESPIRATION RATE: 14 BRPM | HEART RATE: 74 BPM | TEMPERATURE: 98.4 F | DIASTOLIC BLOOD PRESSURE: 91 MMHG

## 2020-01-08 DIAGNOSIS — M54.50 ACUTE LEFT-SIDED LOW BACK PAIN WITHOUT SCIATICA: Primary | ICD-10-CM

## 2020-01-08 LAB
ALBUMIN SERPL-MCNC: 3.7 G/DL (ref 3.4–5)
ALBUMIN/GLOB SERPL: 1.1 {RATIO} (ref 0.8–1.7)
ALP SERPL-CCNC: 55 U/L (ref 45–117)
ALT SERPL-CCNC: 26 U/L (ref 16–61)
ANION GAP SERPL CALC-SCNC: 4 MMOL/L (ref 3–18)
APPEARANCE UR: CLEAR
AST SERPL-CCNC: 13 U/L (ref 10–38)
BASOPHILS # BLD: 0 K/UL (ref 0–0.1)
BASOPHILS NFR BLD: 0 % (ref 0–2)
BILIRUB SERPL-MCNC: 0.4 MG/DL (ref 0.2–1)
BILIRUB UR QL: NEGATIVE
BUN SERPL-MCNC: 29 MG/DL (ref 7–18)
BUN/CREAT SERPL: 30 (ref 12–20)
CALCIUM SERPL-MCNC: 9.4 MG/DL (ref 8.5–10.1)
CHLORIDE SERPL-SCNC: 105 MMOL/L (ref 100–111)
CO2 SERPL-SCNC: 30 MMOL/L (ref 21–32)
COLOR UR: YELLOW
CREAT SERPL-MCNC: 0.97 MG/DL (ref 0.6–1.3)
DIFFERENTIAL METHOD BLD: ABNORMAL
EOSINOPHIL # BLD: 0.1 K/UL (ref 0–0.4)
EOSINOPHIL NFR BLD: 1 % (ref 0–5)
ERYTHROCYTE [DISTWIDTH] IN BLOOD BY AUTOMATED COUNT: 13.5 % (ref 11.6–14.5)
GLOBULIN SER CALC-MCNC: 3.5 G/DL (ref 2–4)
GLUCOSE SERPL-MCNC: 89 MG/DL (ref 74–99)
GLUCOSE UR STRIP.AUTO-MCNC: NEGATIVE MG/DL
HCT VFR BLD AUTO: 43.8 % (ref 36–48)
HGB BLD-MCNC: 14.9 G/DL (ref 13–16)
HGB UR QL STRIP: NEGATIVE
KETONES UR QL STRIP.AUTO: NEGATIVE MG/DL
LEUKOCYTE ESTERASE UR QL STRIP.AUTO: NEGATIVE
LYMPHOCYTES # BLD: 1.9 K/UL (ref 0.9–3.6)
LYMPHOCYTES NFR BLD: 24 % (ref 21–52)
MCH RBC QN AUTO: 30.5 PG (ref 24–34)
MCHC RBC AUTO-ENTMCNC: 34 G/DL (ref 31–37)
MCV RBC AUTO: 89.8 FL (ref 74–97)
MONOCYTES # BLD: 0.7 K/UL (ref 0.05–1.2)
MONOCYTES NFR BLD: 8 % (ref 3–10)
NEUTS SEG # BLD: 5.3 K/UL (ref 1.8–8)
NEUTS SEG NFR BLD: 67 % (ref 40–73)
NITRITE UR QL STRIP.AUTO: NEGATIVE
PH UR STRIP: 7.5 [PH] (ref 5–8)
PLATELET # BLD AUTO: 193 K/UL (ref 135–420)
PMV BLD AUTO: 9 FL (ref 9.2–11.8)
POTASSIUM SERPL-SCNC: 4 MMOL/L (ref 3.5–5.5)
PROT SERPL-MCNC: 7.2 G/DL (ref 6.4–8.2)
PROT UR STRIP-MCNC: NEGATIVE MG/DL
RBC # BLD AUTO: 4.88 M/UL (ref 4.7–5.5)
SODIUM SERPL-SCNC: 139 MMOL/L (ref 136–145)
SP GR UR REFRACTOMETRY: 1.01 (ref 1–1.03)
UROBILINOGEN UR QL STRIP.AUTO: 0.2 EU/DL (ref 0.2–1)
WBC # BLD AUTO: 8 K/UL (ref 4.6–13.2)

## 2020-01-08 PROCEDURE — 81003 URINALYSIS AUTO W/O SCOPE: CPT

## 2020-01-08 PROCEDURE — 74011250636 HC RX REV CODE- 250/636: Performed by: PHYSICIAN ASSISTANT

## 2020-01-08 PROCEDURE — 85025 COMPLETE CBC W/AUTO DIFF WBC: CPT

## 2020-01-08 PROCEDURE — 96372 THER/PROPH/DIAG INJ SC/IM: CPT

## 2020-01-08 PROCEDURE — 80053 COMPREHEN METABOLIC PANEL: CPT

## 2020-01-08 PROCEDURE — 99284 EMERGENCY DEPT VISIT MOD MDM: CPT

## 2020-01-08 RX ORDER — KETOROLAC TROMETHAMINE 15 MG/ML
15 INJECTION, SOLUTION INTRAMUSCULAR; INTRAVENOUS
Status: COMPLETED | OUTPATIENT
Start: 2020-01-08 | End: 2020-01-08

## 2020-01-08 RX ADMIN — KETOROLAC TROMETHAMINE 15 MG: 15 INJECTION, SOLUTION INTRAMUSCULAR; INTRAVENOUS at 16:51

## 2020-01-08 NOTE — ED NOTES
I performed a brief history of the patient here in triage and I have determined that pt will need further treatment and evaluation from the main side ER physician. I have placed initial orders based on the history to help in expediting patients care. Patient reports left flank pain x 4 weeks. Seeing urology for BPH.      Visit Vitals  /85 (BP 1 Location: Left arm, BP Patient Position: At rest)   Pulse 92   Temp 98.4 °F (36.9 °C)   Resp 20   Ht 5' 8\" (1.727 m)   Wt 84.4 kg (186 lb)   SpO2 96%   BMI 28.28 kg/m²       Karin Garcia PA-C   1:54 PM

## 2020-01-08 NOTE — ED TRIAGE NOTES
Patient c/o left flank pain x 4 weeks. States that he is under care of urology for enlarged prostate. States flank pain treated as muscle spasm approximately one week ago without relief.

## 2020-01-08 NOTE — ED PROVIDER NOTES
EMERGENCY DEPARTMENT HISTORY AND PHYSICAL EXAM    Date: 1/8/2020  Patient Name: Cordell Garay    History of Presenting Illness     Chief Complaint   Patient presents with    Flank Pain       HPI: Cordell Garay, 65 y.o. male presents to the ED sent by his PCP today due to concern for a kidney stone. Pt reports pain to Lt lower back x 2 months, atraumatic, constant 6/10, non-radiating. Pain is worse when lying on his Lt side. Pt reports he has not taken anything for his pain. He denies hematuria, flank pain, dysuria, n/v, f/c, abd pain, groin pain. He c/o urinary frequency and states he sees a urologist for that due to BPH. Pt has hx of sciatica for which he takes gabapentin. He states this back pain feels different and does not radiate down his leg. There are no other complaints, changes, or physical findings at this time. Past History     Past Medical History:  Past Medical History:   Diagnosis Date    BPH (benign prostatic hyperplasia)     Hypercholesterolemia     Hypertension     Macular degeneration     NAION (non-arteritic anterior ischemic optic neuropathy)     Pseudophakia        Past Surgical History:  Past Surgical History:   Procedure Laterality Date    ENDOSCOPY, COLON, DIAGNOSTIC      HX CATARACT REMOVAL      HX SHOULDER REPLACEMENT Right 02/2018    HX SHOULDER REPLACEMENT Left 01/07/2019    left shoulder replacement        Family History:  Family History   Problem Relation Age of Onset    Cancer Mother     Heart Disease Father        Social History:  Social History     Tobacco Use    Smoking status: Never Smoker    Smokeless tobacco: Never Used   Substance Use Topics    Alcohol use: Yes     Comment: social    Drug use: No       Allergies:  No Known Allergies      Review of Systems   Constitutional: Negative for chills and fever. Respiratory: Negative for cough and shortness of breath. Cardiovascular: Negative for chest pain and palpitations.    Gastrointestinal: Negative for abdominal pain, nausea and vomiting. Genitourinary: Positive for frequency. Negative for difficulty urinating, dysuria, flank pain, hematuria and urgency. Musculoskeletal: Positive for back pain. Negative for gait problem, joint swelling and myalgias. Skin: Negative. Allergic/Immunologic: Negative. Neurological: Negative for dizziness, weakness and headaches. Psychiatric/Behavioral: Negative. Physical Exam  Vitals signs and nursing note reviewed. Constitutional:       General: He is not in acute distress. Appearance: Normal appearance. He is well-developed. He is not toxic-appearing. HENT:      Head: Normocephalic and atraumatic. Right Ear: External ear normal.      Left Ear: External ear normal.      Nose: Nose normal.   Eyes:      General: Lids are normal. No scleral icterus. Conjunctiva/sclera: Conjunctivae normal.   Neck:      Musculoskeletal: Normal range of motion and neck supple. Cardiovascular:      Rate and Rhythm: Normal rate and regular rhythm. Heart sounds: Normal heart sounds. Pulmonary:      Effort: Pulmonary effort is normal. No respiratory distress. Breath sounds: Normal breath sounds. Abdominal:      Palpations: Abdomen is soft. Tenderness: There is no tenderness. There is no right CVA tenderness, left CVA tenderness or rebound. Musculoskeletal: Normal range of motion. Lumbar back: He exhibits tenderness. He exhibits normal range of motion, no bony tenderness, no swelling, no edema, no deformity, no laceration and no pain. Back:         Arms:    Skin:     General: Skin is warm. Findings: No rash. Neurological:      Mental Status: He is alert and oriented to person, place, and time. Motor: No abnormal muscle tone. Psychiatric:         Speech: Speech normal.         Behavior: Behavior normal. Behavior is cooperative. Thought Content:  Thought content normal.         Judgment: Judgment normal. Diagnostic Study Results     Labs -     Recent Results (from the past 12 hour(s))   URINALYSIS W/ RFLX MICROSCOPIC    Collection Time: 01/08/20  1:58 PM   Result Value Ref Range    Color YELLOW      Appearance CLEAR      Specific gravity 1.012 1.005 - 1.030      pH (UA) 7.5 5.0 - 8.0      Protein NEGATIVE  NEG mg/dL    Glucose NEGATIVE  NEG mg/dL    Ketone NEGATIVE  NEG mg/dL    Bilirubin NEGATIVE  NEG      Blood NEGATIVE  NEG      Urobilinogen 0.2 0.2 - 1.0 EU/dL    Nitrites NEGATIVE  NEG      Leukocyte Esterase NEGATIVE  NEG     CBC WITH AUTOMATED DIFF    Collection Time: 01/08/20  3:00 PM   Result Value Ref Range    WBC 8.0 4.6 - 13.2 K/uL    RBC 4.88 4.70 - 5.50 M/uL    HGB 14.9 13.0 - 16.0 g/dL    HCT 43.8 36.0 - 48.0 %    MCV 89.8 74.0 - 97.0 FL    MCH 30.5 24.0 - 34.0 PG    MCHC 34.0 31.0 - 37.0 g/dL    RDW 13.5 11.6 - 14.5 %    PLATELET 536 552 - 218 K/uL    MPV 9.0 (L) 9.2 - 11.8 FL    NEUTROPHILS 67 40 - 73 %    LYMPHOCYTES 24 21 - 52 %    MONOCYTES 8 3 - 10 %    EOSINOPHILS 1 0 - 5 %    BASOPHILS 0 0 - 2 %    ABS. NEUTROPHILS 5.3 1.8 - 8.0 K/UL    ABS. LYMPHOCYTES 1.9 0.9 - 3.6 K/UL    ABS. MONOCYTES 0.7 0.05 - 1.2 K/UL    ABS. EOSINOPHILS 0.1 0.0 - 0.4 K/UL    ABS. BASOPHILS 0.0 0.0 - 0.1 K/UL    DF AUTOMATED     METABOLIC PANEL, COMPREHENSIVE    Collection Time: 01/08/20  3:00 PM   Result Value Ref Range    Sodium 139 136 - 145 mmol/L    Potassium 4.0 3.5 - 5.5 mmol/L    Chloride 105 100 - 111 mmol/L    CO2 30 21 - 32 mmol/L    Anion gap 4 3.0 - 18 mmol/L    Glucose 89 74 - 99 mg/dL    BUN 29 (H) 7.0 - 18 MG/DL    Creatinine 0.97 0.6 - 1.3 MG/DL    BUN/Creatinine ratio 30 (H) 12 - 20      GFR est AA >60 >60 ml/min/1.73m2    GFR est non-AA >60 >60 ml/min/1.73m2    Calcium 9.4 8.5 - 10.1 MG/DL    Bilirubin, total 0.4 0.2 - 1.0 MG/DL    ALT (SGPT) 26 16 - 61 U/L    AST (SGOT) 13 10 - 38 U/L    Alk.  phosphatase 55 45 - 117 U/L    Protein, total 7.2 6.4 - 8.2 g/dL    Albumin 3.7 3.4 - 5.0 g/dL Globulin 3.5 2.0 - 4.0 g/dL    A-G Ratio 1.1 0.8 - 1.7         Radiologic Studies -   No orders to display     CT Results  (Last 48 hours)    None        CXR Results  (Last 48 hours)    None          Vital Signs-Reviewed the patient's vital signs. Patient Vitals for the past 12 hrs:   Temp Pulse Resp BP SpO2   01/08/20 1645  74 14 (!) 140/91 96 %   01/08/20 1352 98.4 °F (36.9 °C) 92 20 131/85 96 %       I reviewed the vital signs, available nursing notes, past medical history, past surgical history, family history and social history. Provider Notes (Medical Decision Making):   Lt low back pain x 2 weeks, constant, worse when lying on Lt side. Has not taken anything for pain. Sent by PCP for kidney stone workup. Check UA, BMP, CBC. If blood in urine, changes in renal function, or signs of infection will CT abd for stone. Ddx: musculoskeletal pain, lumbar strain, UTI, kidney stone. 5:20 PM Discussed lab results with pt, all normal.  BUN/creat at baseline, no micrscopic hematuria. Discussed with pt whether to do CT abd/pelvis now. Pt agrees to wait, to return if symptoms worsen, and to try taking NSAIDs at home for pain. Pt given Toradol in ED which he states helped his pain. Diagnosis     Clinical Impression:   1. Acute left-sided low back pain without sciatica        Disposition:  Home    PLAN:  1. Current Discharge Medication List        2. Follow-up Information     Follow up With Specialties Details Why [place] [id number] EMERGENCY DEPT Emergency Medicine  If symptoms worsen, As needed Mamadou Fletcher [address] Brock MD Internal Medicine Schedule an appointment as soon as possible for a visit in 2 days If symptoms worsen, for re-evaluation, As needed Rachel Wyatt [address] [place] [address]  [phone]          3. Return to ED if worse   The patient will be discharged home.   Warning signs of worsening condition were discussed and the patient verbalized understanding. Based on patient's age, coexisting illness, exam, and the results of this ED evaluation, the decision to treat as an outpatient was made. While it is impossible to completely exclude the possibility of underlying serious disease or worsening of condition, I feel the relative likelihood is extremely low. I discussed this uncertainty with the patient, who understood ED evaluation and treatment and felt comfortable with the outpatient treatment plan. All questions regarding care, test results, and follow up were answered. The patient is stable and appropriate to discharge. Patient understands importance to return to the emergency department for any new or worsening symptoms. I stressed the importance of follow up for repeat assessment and possibly further evaluation/treatment.         Ani Encinas PA-C

## 2020-01-08 NOTE — DISCHARGE INSTRUCTIONS
Patient Education   Avoid heavy lifting, prolonged sitting or standing. Take Ibuprofen 600 mg every 6 hours or 800 mg every 8 hours if needed for pain. Return to the emergency department if you develop fevers, chills, nausea, vomiting, worsening pain or any other worsening symptoms. Learning About Relief for Back Pain  What is back tension and strain? Back strain happens when you overstretch, or pull, a muscle in your back. You may hurt your back in an accident or when you exercise or lift something. Most back pain will get better with rest and time. You can take care of yourself at home to help your back heal.  What can you do first to relieve back pain? When you first feel back pain, try these steps:  · Walk. Take a short walk (10 to 20 minutes) on a level surface (no slopes, hills, or stairs) every 2 to 3 hours. Walk only distances you can manage without pain, especially leg pain. · Relax. Find a comfortable position for rest. Some people are comfortable on the floor or a medium-firm bed with a small pillow under their head and another under their knees. Some people prefer to lie on their side with a pillow between their knees. Don't stay in one position for too long. · Try heat or ice. Try using a heating pad on a low or medium setting, or take a warm shower, for 15 to 20 minutes every 2 to 3 hours. Or you can buy single-use heat wraps that last up to 8 hours. You can also try an ice pack for 10 to 15 minutes every 2 to 3 hours. You can use an ice pack or a bag of frozen vegetables wrapped in a thin towel. There is not strong evidence that either heat or ice will help, but you can try them to see if they help. You may also want to try switching between heat and cold. · Take pain medicine exactly as directed. ¨ If the doctor gave you a prescription medicine for pain, take it as prescribed.   ¨ If you are not taking a prescription pain medicine, ask your doctor if you can take an over-the-counter medicine. What else can you do? · Stretch and exercise. Exercises that increase flexibility may relieve your pain and make it easier for your muscles to keep your spine in a good, neutral position. And don't forget to keep walking. · Do self-massage. You can use self-massage to unwind after work or school or to energize yourself in the morning. You can easily massage your feet, hands, or neck. Self-massage works best if you are in comfortable clothes and are sitting or lying in a comfortable position. Use oil or lotion to massage bare skin. · Reduce stress. Back pain can lead to a vicious [place]: Distress about the pain tenses the muscles in your back, which in turn causes more pain. Learn how to relax your mind and your muscles to lower your stress. Where can you learn more? Go to http://reji-sigifredo.info/. Enter A250 in the search box to learn more about \"Learning About Relief for Back Pain. \"  Current as of: March 21, 2017  Content Version: 11.5  © 5046-1633 [employer]. Care instructions adapted under license by [employer] (which disclaims liability or warranty for this information). If you have questions about a medical condition or this instruction, always ask your healthcare professional. [address] any warranty or liability for your use of this information.

## 2020-01-23 ENCOUNTER — HOSPITAL ENCOUNTER (OUTPATIENT)
Age: 70
Discharge: HOME | End: 2020-01-23
Attending: INTERNAL MEDICINE
Payer: MEDICARE

## 2020-01-23 DIAGNOSIS — M54.50 LUMBAGO: ICD-10-CM

## 2020-01-23 DIAGNOSIS — S32.009A CLOSED FRACTURE OF LUMBAR VERTEBRA WITHOUT SPINAL CORD INJURY (HCC): ICD-10-CM

## 2020-01-23 DIAGNOSIS — M54.16 LUMBAR RADICULOPATHY: ICD-10-CM

## 2020-01-23 PROCEDURE — 72148 MRI LUMBAR SPINE W/O DYE: CPT

## 2020-02-11 ENCOUNTER — OFFICE VISIT (OUTPATIENT)
Dept: ORTHOPEDIC SURGERY | Age: 70
End: 2020-02-11

## 2020-02-11 VITALS
BODY MASS INDEX: 28.04 KG/M2 | HEIGHT: 68 IN | OXYGEN SATURATION: 100 % | SYSTOLIC BLOOD PRESSURE: 156 MMHG | WEIGHT: 185 LBS | TEMPERATURE: 98 F | HEART RATE: 66 BPM | RESPIRATION RATE: 18 BRPM | DIASTOLIC BLOOD PRESSURE: 77 MMHG

## 2020-02-11 DIAGNOSIS — R29.2: ICD-10-CM

## 2020-02-11 DIAGNOSIS — M48.062 SPINAL STENOSIS OF LUMBAR REGION WITH NEUROGENIC CLAUDICATION: Primary | ICD-10-CM

## 2020-02-11 DIAGNOSIS — M79.18 MYOFASCIAL PAIN: ICD-10-CM

## 2020-02-11 DIAGNOSIS — G95.9 CERVICAL MYELOPATHY (HCC): ICD-10-CM

## 2020-02-11 DIAGNOSIS — R29.2 HYPERREFLEXIA: ICD-10-CM

## 2020-02-11 DIAGNOSIS — R26.89 BALANCE PROBLEM: ICD-10-CM

## 2020-02-11 DIAGNOSIS — M54.59 MECHANICAL LOW BACK PAIN: ICD-10-CM

## 2020-02-11 RX ORDER — ACETAMINOPHEN AND CODEINE PHOSPHATE 300; 60 MG/1; MG/1
TABLET ORAL
Status: ON HOLD | COMMUNITY
Start: 2020-01-17 | End: 2020-03-17 | Stop reason: CLARIF

## 2020-02-11 NOTE — PATIENT INSTRUCTIONS
Lumbar Spinal Stenosis: Care Instructions  Your Care Instructions    Stenosis in the spine is a narrowing of the canal that is around the spinal cord and nerve roots in your back. It can happen as part of aging. Sometimes bone and other tissue grow into this canal and press on the nerves that branch out from the spinal cord. This can cause pain, numbness, and weakness. When it happens in the lower part of your back, it is called lumbar spinal stenosis. It can cause problems in the legs, feet, and rear end (buttocks). You may be able to get relief from the symptoms of spinal stenosis by taking pain medicine. Your doctor may suggest physical therapy and exercises to keep your spine strong and flexible. Some people try steroid shots to reduce swelling. If pain and numbness in your legs are still so bad that you cannot do your normal activities, you may need surgery. Follow-up care is a key part of your treatment and safety. Be sure to make and go to all appointments, and call your doctor if you are having problems. It's also a good idea to know your test results and keep a list of the medicines you take. How can you care for yourself at home? · Take an over-the-counter pain medicine. Nonsteroidal anti-inflammatory drugs (NSAIDs) such as ibuprofen or naproxen seem to work best. But if you can't take NSAIDs, you can try acetaminophen. Be safe with medicines. Read and follow all instructions on the label. · Do not take two or more pain medicines at the same time unless the doctor told you to. Many pain medicines have acetaminophen, which is Tylenol. Too much acetaminophen (Tylenol) can be harmful. · Stay at a healthy weight. Being overweight puts extra strain on your spine. · Change positions often when you sit or stand. This can ease pain. It may also reduce pressure on the spinal cord and its nerves. · Avoid doing things that make your symptoms worse.  Walking downhill and standing for a long time may cause pain.  · Stretch and strengthen your back muscles as your doctor or physical therapist recommends. If your doctor says it is okay to do them, these exercises may help. ? Lie on your back with your knees bent. Gently pull one bent knee to your chest. Put that foot back on the floor, and then pull the other knee to your chest.  ? Do pelvic tilts. Lie on your back with your knees bent. Tighten your stomach muscles. Pull your belly button (navel) in and up toward your ribs. You should feel like your back is pressing to the floor and your hips and pelvis are slightly lifting off the floor. Hold for 6 seconds while breathing smoothly. ? Stand with your back flat against a wall. Slowly slide down until your knees are slightly bent. Hold for 10 seconds, then slide back up the wall. · Remove or change anything in your house that may cause you to fall. Keep walkways clear of clutter, electrical cords, and throw rugs. When should you call for help? Call 911 anytime you think you may need emergency care. For example, call if:    · You are unable to move a leg at all.   [facility] your doctor now or seek immediate medical care if:    · You have new or worse symptoms in your legs, belly, or buttocks. Symptoms may include:  ? Numbness or tingling. ? Weakness. ? Pain.     · You lose bladder or bowel control.    Watch closely for changes in your health, and be sure to contact your doctor if:    · You have a fever, lose weight, or don't feel well.     · You are not getting better as expected. Where can you learn more? Go to http://reji-sigifredo.info/. Allie Shelby in the search box to learn more about \"Lumbar Spinal Stenosis: Care Instructions. \"  Current as of: June 26, 2019  Content Version: 12.2  © 9782-5808 [employer]. Care instructions adapted under license by [employer] (which disclaims liability or warranty for this information).  If you have questions about a medical condition or this instruction, always ask your healthcare professional. [place] [address] any warranty or liability for your use of this information.

## 2020-02-11 NOTE — PROGRESS NOTES
[address]  [address], [address]  [place]  Phone: [phone]  Fax: [phone]        Rochelle Calderón  : 1950  PCP: Isabel Clark MD  2020    NEW PATIENT      HISTORY OF PRESENT ILLNESS  Sara Diop is a 65 y.o. male c/o left-sided low back pain radiating into the LLE crossing multiple dermatomes (anterior thigh, anteromedial distal LLE) . Pt notes that he has been taking Gabapentin 300 mg TID for the LLE pain x 1 year. He finds some benefit from Ibuprofen 800 mg TID for his left-sided low back pain x 3 months. His pain tends to be worse in the morning. His symptoms are worse with standing and improve with sitting. He reports some uneasiness while on his feet. Pt notes that his PCP has ordered him a scoliosis XR, a thoracic MRI, and has also referred him to a neurosurgeon. He states that he is scheduled to begin PT next week (Dr. Adelaide Dougherty). Pt notes that he had cortisone injections in 2018 that provided temporary relief (a couple of weeks). Note from ED dated 2020 indicating patient was seen with c/o left lower back x 2 months, atraumatic and constant. Pain is worse lying on left side. Pt has h/o sciatica for which he takes Gabapentin. Pt noted pain felt different and does not radiate down his leg. Pt was sent to ED by PCP due to concern of kidney stone. Lumbar spine MRI dated 2020 reviewed. Per report, Degenerative changes and/or stenoses are most notable at L2-S1 levels, with up to severe spinal canal stenosis at these levels, most pronounced at L4-L5. Advanced foraminal stenoses at these levels, most pronounced at L5-S1. Potential sites of nerve root abutment described above. Compared to 2018 MRI, degenerative disc disease at L2-L3 has worsened however disc extrusion at this level has improved.  Incidentally noted T10-T11 significant degenerative changes causing roughly moderate spinal canal and moderate foraminal stenoses, not well evaluated on sagittal plane only. Equivocal cord signal abnormality also at this level. Consider dedicated thoracic spine MRI to further characterize at some point. 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 He rates his pain as a 1/10 today. ASSESSMENT  His symptoms are likely due to lumbar spinal stenosis with neurogenic claudication and a component of myofascial pain. Overall, I believe his back pain is a secondary myofascial pain. There may also be a component of cervical spinal stenosis given his myelopathic signs of decreased balance and increased reflexes. PLAN  1. Proceed with PT [facility] [place]) and Thoracic MRI as ordered by PCP. 2. Cervical MRI - hyperreflexia, positive Gurrola's sign on the L, decreased balance    Pt will f/u in after MRI or sooner if needed. Diagnoses and all orders for this visit:    1. Spinal stenosis of lumbar region with neurogenic claudication    2. Mechanical low back pain    3. Myofascial pain    4. Hyperreflexia  -     MRI CERV SPINE WO CONT; Future    5. Gurrola's reflex positive  -     MRI CERV SPINE WO CONT; Future    6. Balance problem  -     MRI CERV SPINE WO CONT; Future    7. Cervical myelopathy ([facility] [address]       CHIEF COMPLAINT  Thomas Hernandez is seen today in consultation at the request of Timbo Aguero MD for complaints of low back pain.       PAST MEDICAL HISTORY   Past Medical History:   Diagnosis Date    BPH (benign prostatic hyperplasia)     Hypercholesterolemia     Hypertension     Macular degeneration     NAION (non-arteritic anterior ischemic optic neuropathy)     Pseudophakia        Past Surgical History:   Procedure Laterality Date    ENDOSCOPY, COLON, DIAGNOSTIC      HX CATARACT REMOVAL      HX SHOULDER REPLACEMENT Right 02/2018    HX SHOULDER REPLACEMENT Left 01/07/2019    left shoulder replacement        MEDICATIONS    Current Outpatient Medications   Medication Sig Dispense Refill    gabapentin (NEURONTIN) 300 mg capsule Take 300 mg by mouth three (3) times daily.  krill-om-3-dha-epa-phospho-ast (KRILL OIL) 1,[id number] mg cap Take  by mouth.  aspirin delayed-release 325 mg tablet Take 1 Tab by mouth two (2) times a day. (Patient taking differently: Take 81 mg by mouth two (2) times a day.) 14 Tab 0    tamsulosin (FLOMAX) 0.4 mg capsule Take 0.4 mg by mouth daily (after dinner).  cyanocobalamin 1,000 mcg tablet Take 1,000 mcg by mouth daily.  brimonidine (ALPHAGAN P) 0.1 % ophthalmic solution Administer 1 Drop to both eyes two (2) times a day.  acetaminophen (TYLENOL) 325 mg tablet Take 2 Tabs by mouth every six (6) hours as needed for Pain. 60 Tab 0    CINNAMON BARK (CINNAMON PO) Take  by mouth two (2) times a day.  CALCIUM PO Take  by mouth daily.  lycopene 10 mg cap Take  by mouth.  lutein 40 mg cap Take  by mouth.  omega-3 fatty acids-vitamin e (FISH OIL) 1,000 mg cap Take 1 Cap by mouth.  coenzyme q10-vitamin e (COQ10 ) 100-100 mg-unit cap Take  by mouth.  garlic cap Take  by mouth.  rosuvastatin (CRESTOR) 10 mg tablet Take 10 mg by mouth nightly.  benazepril (LOTENSIN) 5 mg tablet Take 10 mg by mouth daily.  saw palmetto 320 mg Cap Take  by mouth.  cholecalciferol, vitamin d3, (VITAMIN D3) 1,000 unit tablet Take  by mouth daily.  MULTIVITAMIN WITH MINERALS PO Take  by mouth.            ALLERGIES  No Known Allergies       SOCIAL HISTORY    Social History     Socioeconomic History    Marital status: SINGLE     Spouse name: Not on file    Number of children: Not on file    Years of education: Not on file    Highest education level: Not on file   Tobacco Use    Smoking status: Never Smoker    Smokeless tobacco: Never Used   Substance and Sexual Activity    Alcohol use: Yes     Comment: social    Drug use: No       FAMILY HISTORY  Family History   Problem Relation Age of Onset    Cancer Mother     Heart Disease Father REVIEW OF SYSTEMS  Review of Systems   Musculoskeletal: Positive for back pain. LLE paraesthesia   Neurological: Positive for tingling. PHYSICAL EXAMINATION  Visit Vitals  /77   Pulse 66   Temp 98 °F (36.7 °C)   Resp 18   Ht 5' 8\" (1.727 m)   Wt 185 lb (83.9 kg)   SpO2 100%   BMI 28.13 kg/m²         Pain Assessment  9/10/2019   Location of Pain Leg   Location Modifiers Right   Severity of Pain 2   Quality of Pain Sharp   Quality of Pain Comment -   Duration of Pain A few minutes   Duration of Pain Comment -   Frequency of Pain Several days a week   Aggravating Factors Walking;Standing   Aggravating Factors Comment -   Limiting Behavior No   Relieving Factors Rest   Relieving Factors Comment -   Result of Injury Yes   Work-Related Injury No   Type of Injury Fall         Constitutional:  Well developed, well nourished, in no acute distress. Psychiatric: Affect and mood are appropriate. HEENT: Normocephalic, atraumatic. Extraocular movements intact. Integumentary: No rashes or abrasions noted on exposed areas. Cardiovascular: Regular rate and rhythm. Pulmonary: Clear to auscultation bilaterally. SPINE/MUSCULOSKELETAL EXAM    Cervical spine:  Neck is midline. Normal muscle tone. No focal atrophy is noted. ROM pain free. Shoulder ROM intact. No tenderness to palpation. Negative Spurling's sign. Negative Tinel's sign. Positive Gurrola's sign on the left. Sensation in the bilateral arms grossly intact to light touch. Lumbar spine:  No rash, ecchymosis, or gross obliquity. No fasciculations. No focal atrophy is noted. No pain with hip ROM. Full range of motion. Tenderness to palpation of left lower lumbar paraspinals and QL with palpable knots. Pain with rotation to the left. No tenderness to palpation at the sciatic notch. SI joints non-tender. Trochanters non tender.      Sensation in the bilateral legs grossly intact to light touch.    No pain with internal rotation of L hip. Negative hip scour test.    MOTOR:      Biceps  Triceps Deltoids Wrist Ext Wrist Flex Hand Intrin   Right 5/5 5/5 5/5 5/5 5/5 5/5   Left 5/5 5/5 5/5 5/5 5/5 5/5             Hip Flex  Quads Hamstrings Ankle DF EHL Ankle PF   Right 5/5 5/5 5/5 5/5 5/5 5/5   Left 5/5 5/5 5/5 5/5 5/5 5/5     DTRs are brisk on the L -  biceps, triceps, brachioradialis, patella, and Achilles. Negative Straight Leg raise. Squat not tested. No difficulty with tandem gait. Ambulation without assistive device. FWB. RADIOGRAPHS  Lumbar MRI images taken on 1/23/2020 personally reviewed with patient:  General: Mild lumbar levocurvature apex L3. Vertebral body heights preserved. Advanced disc space loss and/or endplate changes at levels L2-L5. Trace T11-T12  anterolisthesis. Lumbar lordosis maintained. Conus ends at level L1-L2. Equivocal cord signal abnormality at T10-T11, not well evaluated on sagittal  view only. No abnormal epidural collection identified. Multilevel endplate  edema, most notable at L2-L3, likely due to active disc disease. No suspicious  focal marrow lesion identified.     Miscellaneous: Probable left renal cysts.     Levels:     T10-T11: Evaluated sagittal plane only. Notable eccentric to the right disc  herniation and mild to moderate facet arthropathy with suspected at least  moderate spinal canal stenosis and moderate foraminal stenoses.     T11-T12: Trace anterolisthesis. Left lateral recess focal disc protrusion with  background mild disc bulge. Moderate left greater and right facet arthropathy. Moderate eccentric to left spinal canal stenosis. Mild left foraminal stenosis. Right foramen patent.     T12-L1: No significant posterior disc herniation. Mild facet arthropathy. No  significant stenosis.     L1-L2: Minimal disc bulge. Mild facet arthropathy. Spinal canal patent.  Foramina  patent.     L2-L3: Disc space loss with mild to moderate disc/osteophyte bulge. Moderate  bilateral facet arthropathy. Moderate to severe spinal canal stenosis. Moderate  bilateral foraminal stenoses with probable abutment of the exiting L2 nerves.     L3-L4: Mild disc space loss with mild eccentric to the right disc/osteophyte  bulge. Moderate right and mild left facet arthropathy. Moderate eccentric to the  right spinal canal stenosis with probable abutment of several right-sided cauda  equina nerves. Moderate right and mild left foraminal stenoses with probable  abutment of the exiting right L3 nerve.     L4-L5: Disc space loss with moderate disc/osteophyte bulge eccentric to the  right. Moderate facet arthropathy. Severe spinal canal stenosis. Moderate  bilateral foraminal stenoses with probable abutment of the exiting L4 nerves  bilaterally.     L5-S1: Disc space loss with mild to moderate disc/osteophyte bulge. Moderate  facet arthropathy. Moderate to severe spinal canal stenosis. Severe bilateral  foraminal stenoses with probable abutment of the exiting L5 nerves.     Imaged sacrum: Unremarkable.     IMPRESSION  IMPRESSION:     Degenerative changes and/or stenoses are most notable at L2-S1 levels, with up  to severe spinal canal stenosis at these levels, most pronounced at L4-L5.  -Advanced foraminal stenoses at these levels, most pronounced at L5-S1. Potential sites of nerve root abutment described above.  -Compared to 2018 MRI, degenerative disc disease at L2-L3 has worsened however  disc extrusion at this level has improved.     Incidentally noted T10-T11 significant degenerative changes causing roughly  moderate spinal canal and moderate foraminal stenoses, not well evaluated on  sagittal plane only. Equivocal cord signal abnormality also at this level. Consider dedicated thoracic spine MRI to further characterize at some point.  reviewed    Mr. Clay Frederick has a reminder for a \"due or due soon\" health maintenance.  I have asked that he contact his primary care provider for follow-up on this health maintenance. 30 minutes of face-to-face contact were spent with the patient during today's visit extensively discussing symptoms and treatment plan. All questions were answered. More than half of this visit today was spent on counseling. Written by Martinez Lopez, as dictated by Dr. Tamara Campbell. I, Dr. Tamara Campbell, confirm that all documentation is accurate.

## 2020-02-18 ENCOUNTER — HOSPITAL ENCOUNTER (OUTPATIENT)
Age: 70
Discharge: HOME | End: 2020-02-18
Attending: PHYSICAL MEDICINE & REHABILITATION
Payer: MEDICARE

## 2020-02-18 ENCOUNTER — HOSPITAL ENCOUNTER (OUTPATIENT)
Age: 70
Discharge: HOME | End: 2020-02-18
Attending: NEUROLOGICAL SURGERY
Payer: MEDICARE

## 2020-02-18 DIAGNOSIS — R26.89 BALANCE PROBLEM: ICD-10-CM

## 2020-02-18 DIAGNOSIS — R29.2 HYPERREFLEXIA: ICD-10-CM

## 2020-02-18 DIAGNOSIS — M48.04 THORACIC SPINAL STENOSIS: ICD-10-CM

## 2020-02-18 DIAGNOSIS — R29.2: ICD-10-CM

## 2020-02-18 PROCEDURE — 72141 MRI NECK SPINE W/O DYE: CPT

## 2020-02-18 PROCEDURE — 72146 MRI CHEST SPINE W/O DYE: CPT

## 2020-02-19 ENCOUNTER — TELEPHONE (OUTPATIENT)
Dept: ORTHOPEDIC SURGERY | Age: 70
End: 2020-02-19

## 2020-02-19 NOTE — TELEPHONE ENCOUNTER
----- Message from Zan Albarado MD sent at 2/19/2020  9:18 AM EST -----  Can we order a thoracic MRI to follow up on the finding at T2.  It is most likely hemangioma, which is ok, but they cast some doubt and there could be other findings that may be pertinent to him.   ----- Message -----  From: Mehul Santos Results In  Sent: 2/18/2020   1:00 PM EST  To: Zan Albarado MD

## 2020-03-02 ENCOUNTER — HOSPITAL ENCOUNTER (OUTPATIENT)
Dept: LAB | Age: 70
Discharge: HOME | End: 2020-03-02

## 2020-03-02 ENCOUNTER — HOSPITAL ENCOUNTER (OUTPATIENT)
Dept: LAB | Age: 70
Discharge: HOME | End: 2020-03-02
Payer: MEDICARE

## 2020-03-02 DIAGNOSIS — Z01.818 PRE-OP EVALUATION: ICD-10-CM

## 2020-03-02 LAB
ANION GAP SERPL CALC-SCNC: 5 MMOL/L (ref 3–18)
APPEARANCE UR: CLEAR
ATRIAL RATE: 78 BPM
BILIRUB UR QL: NEGATIVE
BUN SERPL-MCNC: 19 MG/DL (ref 7–18)
BUN/CREAT SERPL: 19 (ref 12–20)
CALCIUM SERPL-MCNC: 9.6 MG/DL (ref 8.5–10.1)
CALCULATED P AXIS, ECG09: 62 DEGREES
CALCULATED R AXIS, ECG10: -5 DEGREES
CALCULATED T AXIS, ECG11: 34 DEGREES
CHLORIDE SERPL-SCNC: 107 MMOL/L (ref 100–111)
CO2 SERPL-SCNC: 27 MMOL/L (ref 21–32)
COLOR UR: YELLOW
CREAT SERPL-MCNC: 0.99 MG/DL (ref 0.6–1.3)
DIAGNOSIS, 93000: NORMAL
ERYTHROCYTE [DISTWIDTH] IN BLOOD BY AUTOMATED COUNT: 13.4 % (ref 11.6–14.5)
GLUCOSE SERPL-MCNC: 120 MG/DL (ref 74–99)
GLUCOSE UR STRIP.AUTO-MCNC: NEGATIVE MG/DL
HCT VFR BLD AUTO: 37.4 % (ref 36–48)
HGB BLD-MCNC: 13 G/DL (ref 13–16)
HGB UR QL STRIP: NEGATIVE
KETONES UR QL STRIP.AUTO: NEGATIVE MG/DL
LEUKOCYTE ESTERASE UR QL STRIP.AUTO: NEGATIVE
MCH RBC QN AUTO: 30.4 PG (ref 24–34)
MCHC RBC AUTO-ENTMCNC: 34.8 G/DL (ref 31–37)
MCV RBC AUTO: 87.4 FL (ref 74–97)
NITRITE UR QL STRIP.AUTO: NEGATIVE
P-R INTERVAL, ECG05: 138 MS
PH UR STRIP: 5.5 [PH] (ref 5–8)
PLATELET # BLD AUTO: 242 K/UL (ref 135–420)
PMV BLD AUTO: 9.6 FL (ref 9.2–11.8)
POTASSIUM SERPL-SCNC: 3.9 MMOL/L (ref 3.5–5.5)
PROT UR STRIP-MCNC: NEGATIVE MG/DL
Q-T INTERVAL, ECG07: 354 MS
QRS DURATION, ECG06: 92 MS
QTC CALCULATION (BEZET), ECG08: 403 MS
RBC # BLD AUTO: 4.28 M/UL (ref 4.7–5.5)
SODIUM SERPL-SCNC: 139 MMOL/L (ref 136–145)
SP GR UR REFRACTOMETRY: 1.01 (ref 1–1.03)
UROBILINOGEN UR QL STRIP.AUTO: 0.2 EU/DL (ref 0.2–1)
VENTRICULAR RATE, ECG03: 78 BPM
WBC # BLD AUTO: 6.2 K/UL (ref 4.6–13.2)

## 2020-03-02 PROCEDURE — 80048 BASIC METABOLIC PNL TOTAL CA: CPT

## 2020-03-02 PROCEDURE — 87086 URINE CULTURE/COLONY COUNT: CPT

## 2020-03-02 PROCEDURE — 93005 ELECTROCARDIOGRAM TRACING: CPT

## 2020-03-02 PROCEDURE — 36415 COLL VENOUS BLD VENIPUNCTURE: CPT

## 2020-03-02 PROCEDURE — 81003 URINALYSIS AUTO W/O SCOPE: CPT

## 2020-03-02 PROCEDURE — 85027 COMPLETE CBC AUTOMATED: CPT

## 2020-03-03 LAB
BACTERIA SPEC CULT: NORMAL
SERVICE CMNT-IMP: NORMAL

## 2020-03-16 ENCOUNTER — OFFICE VISIT (OUTPATIENT)
Dept: ORTHOPEDIC SURGERY | Age: 70
End: 2020-03-16

## 2020-03-16 VITALS
SYSTOLIC BLOOD PRESSURE: 149 MMHG | TEMPERATURE: 98.5 F | DIASTOLIC BLOOD PRESSURE: 87 MMHG | OXYGEN SATURATION: 98 % | HEART RATE: 69 BPM | RESPIRATION RATE: 20 BRPM | HEIGHT: 68 IN | BODY MASS INDEX: 28.07 KG/M2 | WEIGHT: 185.2 LBS

## 2020-03-16 DIAGNOSIS — M48.062 SPINAL STENOSIS OF LUMBAR REGION WITH NEUROGENIC CLAUDICATION: ICD-10-CM

## 2020-03-16 DIAGNOSIS — R29.2: ICD-10-CM

## 2020-03-16 DIAGNOSIS — R26.89 BALANCE PROBLEM: ICD-10-CM

## 2020-03-16 DIAGNOSIS — M79.18 MYOFASCIAL PAIN: ICD-10-CM

## 2020-03-16 DIAGNOSIS — M54.59 MECHANICAL LOW BACK PAIN: ICD-10-CM

## 2020-03-16 DIAGNOSIS — G95.9 CERVICAL MYELOPATHY (HCC): Primary | ICD-10-CM

## 2020-03-16 DIAGNOSIS — R29.2 HYPERREFLEXIA: ICD-10-CM

## 2020-03-16 RX ORDER — EMOLLIENT COMBINATION NO.43
CREAM (GRAM) TOPICAL AS NEEDED
COMMUNITY
Start: 2020-03-10 | End: 2020-04-10

## 2020-03-16 RX ORDER — PREDNISONE 10 MG/1
TABLET ORAL
Qty: 21 TAB | Refills: 0 | Status: ON HOLD | OUTPATIENT
Start: 2020-03-16 | End: 2020-03-17 | Stop reason: CLARIF

## 2020-03-16 NOTE — PROGRESS NOTES
[address], [address]  [facility], [address]  Phone: [phone]  Fax: [phone]        Karel Bella  : 1950  PCP: Ligia Bronw MD  3/16/2020    PROGRESS NOTE      HISTORY OF PRESENT ILLNESS  Naheed Adrian is a 65 y.o. male who was seen as a new patient 2020 with c/o left-sided low back pain radiating into the LLE crossing multiple dermatomes (anterior thigh, anteromedial distal LLE) . Pt notes that he has been taking Gabapentin 300 mg TID for the LLE pain x 1 year. He finds some benefit from Ibuprofen 800 mg TID for his left-sided low back pain x 3 months. His pain tends to be worse in the morning. His symptoms are worse with standing and improve with sitting. He reports some uneasiness while on his feet. Pt notes that his PCP has ordered him a scoliosis XR, a thoracic MRI, and has also referred him to a neurosurgeon. He states that he is scheduled to begin PT next week (Dr. Harlan Barragan). Pt notes that he had cortisone injections in 2018 that provided temporary relief (a couple of weeks). Note from ED dated 2020 indicating patient was seen with c/o left lower back x 2 months, atraumatic and constant. Pain is worse lying on left side. Pt has h/o sciatica for which he takes Gabapentin. Pt noted pain felt different and does not radiate down his leg. Pt was sent to ED by PCP due to concern of kidney stone. Lumbar spine MRI dated 2020 reviewed. Per report, Degenerative changes and/or stenoses are most notable at L2-S1 levels, with up to severe spinal canal stenosis at these levels, most pronounced at L4-L5. Advanced foraminal stenoses at these levels, most pronounced at L5-S1. Potential sites of nerve root abutment described above. Compared to 2018 MRI, degenerative disc disease at L2-L3 has worsened however disc extrusion at this level has improved.  Incidentally noted T10-T11 significant degenerative changes causing roughly moderate spinal canal and moderate foraminal stenoses, not well evaluated on sagittal plane only. Equivocal cord signal abnormality also at this level. Consider dedicated thoracic spine MRI to further characterize at some point. 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 Kevon Griffith comes in to the office today for f/u. Pt notes that he has been doing well since his neurosurgeon referred him to PT. Pt notes that he has some neck pain off and on, more on the right side. He endorses decreased balance, but he attributed it to Gabapentin. Cervical spine MRI dated 2/18/2020 reviewed. Per report, Limited examination due to severe S-shaped rotoscoliosis and reversal of cervical lordosis. Multilevel degenerative findings causing various degrees of spinal canal and neural foraminal narrowing. Multiple areas of suspected abnormal spinal cord signal which may be acute and/or chronic, presumably chronic. Findings most definitive at C3-C4. C3-C4 spondylolisthesis. Recommend flexion/extension radiographs to evaluate for instability. Presumed T3 hemangioma. Correlate with dedicated imaging. Thoracic spine MRI dated 2/18/2020 reviewed. Per report, Scoliosis and multilevel degenerative disease, worst at T11-T12 followed by T9-T10, T10-T11 and T7-T8 with additional findings as described. He rates his pain as a 4/10 today. ASSESSMENT  His symptoms are likely due to severe cervical spinal stenosis at C3-4 with mass effect on the spinal cord. Pt reports decreased balance, and on physical examination, he had increased reflexes. There is also evidence of lumbar spinal stenosis and myofascial pain. He is finding some benefit from PT. Pt notes that he has prostate surgery tomorrow. He rates his pain as a 4/10 today. PLAN  1. Referral to Dr. Stefany Chris for surgical consult. Pt will f/u with Dr. Stefany Chris. Diagnoses and all orders for this visit:    1.  Cervical myelopathy (HCC)  -     REFERRAL TO SPINE SURGERY  -     predniSONE (STERAPRED DS) 10 mg dose pack; See administration instruction per 10mg dose pack    2. Spinal stenosis of lumbar region with neurogenic claudication    3. Mechanical low back pain    4. Myofascial pain    5. Hyperreflexia  -     REFERRAL TO SPINE SURGERY    6. Gurrola's reflex positive  -     REFERRAL TO SPINE SURGERY    7. Balance problem  -     REFERRAL TO SPINE SURGERY         PAST MEDICAL HISTORY   Past Medical History:   Diagnosis Date    Benign prostatic hyperplasia, presence of lower urinary tract symptoms unspecified     BPH (benign prostatic hyperplasia)     HLD (hyperlipidemia)     Hypercholesterolemia     Hypertension     Ill-defined condition     Spine   ( back) problem. ..goes for Physical Therapy    Macular degeneration     NAION (non-arteritic anterior ischemic optic neuropathy)     Post-void dribbling     Pseudophakia        Past Surgical History:   Procedure Laterality Date    ENDOSCOPY, COLON, DIAGNOSTIC      HX CATARACT REMOVAL Bilateral     HX HEENT Bilateral     eyelid surgery, corrective    HX SHOULDER REPLACEMENT Right 02/2018    HX SHOULDER REPLACEMENT Left 01/07/2019    left shoulder replacement    . MEDICATIONS    Current Outpatient Medications   Medication Sig Dispense Refill    aspirin delayed-release 81 mg tablet Take 81 mg by mouth daily. Indications: takes daily after dinner      psyllium (METAMUCIL) powd Take  by mouth two (2) times a day.  acetaminophen-codeine (TYLENOL #4) 300-60 mg tab       gabapentin (NEURONTIN) 300 mg capsule Take 300 mg by mouth three (3) times daily.  krill-om-3-dha-epa-phospho-ast (KRILL OIL) 1,098-865-30-80 mg cap Take  by mouth.  tamsulosin (FLOMAX) 0.4 mg capsule Take 0.4 mg by mouth daily (after dinner).  cyanocobalamin 1,000 mcg tablet Take 1,000 mcg by mouth daily.  brimonidine (ALPHAGAN P) 0.1 % ophthalmic solution Administer 1 Drop to both eyes two (2) times a day.       acetaminophen (TYLENOL) 325 mg tablet Take 2 Tabs by mouth every six (6) hours as needed for Pain. 60 Tab 0    CINNAMON BARK (CINNAMON PO) Take  by mouth two (2) times a day.  CALCIUM PO Take  by mouth daily.  lycopene 10 mg cap Take  by mouth.  lutein 40 mg cap Take  by mouth.  omega-3 fatty acids-vitamin e (FISH OIL) 1,000 mg cap Take 1 Cap by mouth.  coenzyme q10-vitamin e (COQ10 ) 100-100 mg-unit cap Take  by mouth.  garlic cap Take  by mouth.  rosuvastatin (CRESTOR) 10 mg tablet Take 10 mg by mouth nightly.  benazepril (LOTENSIN) 5 mg tablet Take 10 mg by mouth daily.  saw palmetto 320 mg Cap Take  by mouth.  cholecalciferol, vitamin d3, (VITAMIN D3) 1,000 unit tablet Take  by mouth daily.  MULTIVITAMIN WITH MINERALS PO Take  by mouth. ALLERGIES  No Known Allergies       SOCIAL HISTORY    Social History     Socioeconomic History    Marital status: SINGLE     Spouse name: Not on file    Number of children: Not on file    Years of education: Not on file    Highest education level: Not on file   Tobacco Use    Smoking status: Never Smoker    Smokeless tobacco: Never Used   Substance and Sexual Activity    Alcohol use: Yes     Alcohol/week: 1.0 standard drinks     Types: 1 Glasses of wine per week     Comment: social    Drug use: Never       FAMILY HISTORY  Family History   Problem Relation Age of Onset    Cancer Mother     Heart Disease Father          REVIEW OF SYSTEMS  Review of Systems   Musculoskeletal: Positive for back pain and neck pain. LLE paraesthesia    Neurological: Positive for tingling.           PHYSICAL EXAMINATION  Visit Vitals  /87 (BP 1 Location: Right arm, BP Patient Position: Sitting)   Pulse 69   Temp 98.5 °F (36.9 °C) (Oral)   Resp 20   Ht 5' 8\" (1.727 m)   Wt 185 lb 3.2 oz (84 kg)   SpO2 98%   BMI 28.16 kg/m²       Pain Assessment  3/16/2020   Location of Pain Back   Location Modifiers Inferior   Severity of Pain 4 Quality of Pain Throbbing   Quality of Pain Comment -   Duration of Pain -   Duration of Pain Comment -   Frequency of Pain Intermittent   Aggravating Factors -   Aggravating Factors Comment -   Limiting Behavior -   Relieving Factors -   Relieving Factors Comment -   Result of Injury No   Work-Related Injury -   Type of Injury -           Constitutional:  Well developed, well nourished, in no acute distress. Psychiatric: Affect and mood are appropriate. Integumentary: No rashes or abrasions noted on exposed areas. SPINE/MUSCULOSKELETAL EXAM    Cervical spine:  Neck is midline. Normal muscle tone. No focal atrophy is noted. ROM pain free. Shoulder ROM intact. No tenderness to palpation. Negative Spurling's sign. Negative Tinel's sign. Positive Gurrola's sign on the left. Sensation in the bilateral arms grossly intact to light touch.      Lumbar spine:  No rash, ecchymosis, or gross obliquity. No fasciculations. No focal atrophy is noted. No pain with hip ROM. Full range of motion. Tenderness to palpation of left lower lumbar paraspinals and QL with palpable knots. Pain with rotation to the left. No tenderness to palpation at the sciatic notch. SI joints non-tender. Trochanters non tender. Sensation in the bilateral legs grossly intact to light touch.     No pain with internal rotation of L hip. Negative hip scour test.       MOTOR:      Biceps  Triceps Deltoids Wrist Ext Wrist Flex Hand Intrin   Right 5/5 5/5 5/5 5/5 5/5 5/5   Left 5/5 5/5 5/5 5/5 5/5 5/5             Hip Flex  Quads Hamstrings Ankle DF EHL Ankle PF   Right 5/5 5/5 5/5 5/5 5/5 5/5   Left 5/5 5/5 5/5 5/5 5/5 5/5     DTRs are brisk on the L -  biceps, triceps, brachioradialis, patella, and Achilles.     Negative Straight Leg raise. Squat not tested.    No difficulty with tandem gait.      Ambulation without assistive device. FWB.       RADIOGRAPHS  Cervical MRI images taken on 2/18/2020 personally reviewed with patient:     Severe S-shaped scoliosis to the cervical thoracic spine. Rotatory component. Reversal of cervical lordosis. Anterolisthesis C3 on C4 of approximately 4 mm.     Partially evaluated T4 lesion which appears to be bright on T2 and speckled on  T1, presumably hemangioma.     Multilevel degenerative vertebral body height loss most pronounced in the mid  lower cervical spine and mildly in the upper thoracic spine.     Multilevel disc degeneration with disc height loss and associated endplate  osteophyte formation noted throughout most pronounced in the mid to lower  cervical spine.     Multilevel facet arthropathy is noted bilaterally but most pronounced in the  right.     Mixed Modic changes are noted most pronounced in the partially visualized upper  thoracic spine and in the mid to lower cervical spine. This consists of endplate  edema and fatty endplate change primarily with some areas of endplate sclerosis.     Alignment limits evaluation for narrowing.     C2-C3: Left facet arthropathy and bilateral uncovertebral joint hypertrophy. Small broad-based bulge. Minimal encroachment on the spinal canal. Moderate to  severe left and moderate right neural foraminal narrowing.     C3-C4: Based bulge/pseudobulge with endplate osteophytes and uncovertebral joint  hypertrophy. Severe left facet arthropathy. Severe bilateral neural foraminal  narrowing, slightly more pronounced the left. Moderate/severe spinal canal  narrowing with mass effect on the spinal cord. Abnormal spinal cord signal  suggested.     C4-C5: Broad-based disc osteophyte complex and bilateral uncovertebral joint  hypertrophy. Left facet arthropathy. Severe bilateral neural foraminal  narrowing. Mild/moderate spinal canal narrowing with mass effect on the spinal  cord.  No definitive abnormal spinal cord signal.     C5-C6: Broad-based disc osteophyte complex and bilateral uncovertebral joint  hypertrophy. Severe bilateral neuroforaminal narrowing, right greater than left. Moderate spinal canal narrowing with mass effect on the spinal cord. Question  abnormal spinal cord signal though exam is limited due to motion. Artifact not  excluded.     C6-C7: Broad-based disc osteophyte complex asymmetric to the right with  bilateral uncovertebral joint hypertrophy. Severe bilateral neural foraminal  narrowing. Moderate spinal canal to severe spinal canal narrowing most  pronounced in the right. Question abnormal spinal cord signal versus artifact.     C7-T1: Broad-based disc osteophyte complex and bilateral uncovertebral joint  hypertrophy. Mild spinal canal narrowing with encroachment on the spinal cord. Severe bilateral neural foraminal narrowing.     T1-T2: Broad-based bulge. This is asymmetric to the right. Severe right neural  foraminal narrowing. Mild left neural foraminal narrowing. Minimal effacement of  the anterior thecal sac.     IMPRESSION  IMPRESSION:     Limited examination due to severe S-shaped rotoscoliosis and reversal of  cervical lordosis.     Multilevel degenerative findings causing various degrees of spinal canal and  neural foraminal narrowing. Multiple areas of suspected abnormal spinal cord  signal which may be acute and/or chronic, presumably chronic. Findings most  definitive at C3-C4.     C3-C4 spondylolisthesis. Recommend flexion/extension radiographs to evaluate for  instability.     Presumed T3 hemangioma. Correlate with dedicated imaging.     Please see report for multiple additional findings and further details. Thoracic MRI images taken on 2/18/2020 personally reviewed with patient:     Mild to moderate thoracic scoliosis, right convexity with mild right lateral  subluxation of T11 on T12. Prominent left-sided osteophytes along the concavity  of the scoliosis.  Prominent right lateral osteophyte at T10-T11. Loss of  thoracic kyphosis. No spondylolisthesis. No focal compression deformity. No  pathologic marrow signal. Scattered fat islands/hemangioma noted. Discogenic  endplate edema most severe at T7-T8.     Moderate degenerative disc disease in cervical spine also partially seen on  sagittal images. No definite cord compression.     No paraspinal mass.     Thoracic spinal cord shows no intrinsic lesions. Conus medullaris ends at L1  with normal morphology and signal intensity.     T1-T2: Right foraminal stenosis from facet and uncovertebral hypertrophy. No  central stenosis or cord compression. Patent left foramen.      T7-T8: Posterior disc bulge with no cord contact or compression. Mild central  stenosis. No significant foraminal stenosis.     T8-T9: Minimal posterior disc bulge. No central stenosis. No foraminal stenosis.     T9-T10: More severe with disc bulge, left posterior lateral disc protrusion,  contacting spinal cord with moderate central stenosis. No cord compression or  edema. Mild bilateral foraminal narrowing.     T10-T11:  Posterior disc bulge and right posterior lateral disc protrusion. No  central stenosis or cord contact. Moderate right foraminal stenosis. Mild left  foraminal stenosis.     T11-T12: Left posterior lateral disc protrusion. Prominent left facet and  ligamentous hypertrophy, indenting left lateral side of spinal cord. No cord  edema. Moderately severe left foraminal stenosis. Patent right foramen.     T12-L1 and other levels: No significant abnormalities     IMPRESSION  IMPRESSION: Scoliosis and multilevel degenerative disease, worst at T11-T12  followed by T9-T10, T10-T11 and T7-T8 with additional findings as described. Lumbar MRI images taken on 1/23/2020 personally reviewed with patient:  General: Mild lumbar levocurvature apex L3. Vertebral body heights preserved. Advanced disc space loss and/or endplate changes at levels L2-L5.  Trace T11-T12  anterolisthesis. Lumbar lordosis maintained. Conus ends at level L1-L2. Equivocal cord signal abnormality at T10-T11, not well evaluated on sagittal  view only. No abnormal epidural collection identified. Multilevel endplate  edema, most notable at L2-L3, likely due to active disc disease. No suspicious  focal marrow lesion identified.     Miscellaneous: Probable left renal cysts.     Levels:     T10-T11: Evaluated sagittal plane only. Notable eccentric to the right disc  herniation and mild to moderate facet arthropathy with suspected at least  moderate spinal canal stenosis and moderate foraminal stenoses.     T11-T12: Trace anterolisthesis. Left lateral recess focal disc protrusion with  background mild disc bulge. Moderate left greater and right facet arthropathy. Moderate eccentric to left spinal canal stenosis. Mild left foraminal stenosis. Right foramen patent.     T12-L1: No significant posterior disc herniation. Mild facet arthropathy. No  significant stenosis.     L1-L2: Minimal disc bulge. Mild facet arthropathy. Spinal canal patent. Foramina  patent.     L2-L3: Disc space loss with mild to moderate disc/osteophyte bulge. Moderate  bilateral facet arthropathy. Moderate to severe spinal canal stenosis. Moderate  bilateral foraminal stenoses with probable abutment of the exiting L2 nerves.     L3-L4: Mild disc space loss with mild eccentric to the right disc/osteophyte  bulge. Moderate right and mild left facet arthropathy. Moderate eccentric to the  right spinal canal stenosis with probable abutment of several right-sided cauda  equina nerves. Moderate right and mild left foraminal stenoses with probable  abutment of the exiting right L3 nerve.     L4-L5: Disc space loss with moderate disc/osteophyte bulge eccentric to the  right. Moderate facet arthropathy. Severe spinal canal stenosis.  Moderate  bilateral foraminal stenoses with probable abutment of the exiting L4 nerves  bilaterally.     L5-S1: Disc space loss with mild to moderate disc/osteophyte bulge. Moderate  facet arthropathy. Moderate to severe spinal canal stenosis. Severe bilateral  foraminal stenoses with probable abutment of the exiting L5 nerves.     Imaged sacrum: Unremarkable.     IMPRESSION  IMPRESSION:     Degenerative changes and/or stenoses are most notable at L2-S1 levels, with up  to severe spinal canal stenosis at these levels, most pronounced at L4-L5.  -Advanced foraminal stenoses at these levels, most pronounced at L5-S1. Potential sites of nerve root abutment described above.  -Compared to 2018 MRI, degenerative disc disease at L2-L3 has worsened however  disc extrusion at this level has improved.     Incidentally noted T10-T11 significant degenerative changes causing roughly  moderate spinal canal and moderate foraminal stenoses, not well evaluated on  sagittal plane only. Equivocal cord signal abnormality also at this level. Consider dedicated thoracic spine MRI to further characterize at some point. 12 minutes of face-to-face contact were spent with the patient during today's visit extensively discussing symptoms and treatment plan. All questions were answered. More than half of this visit today was spent on counseling.      Written by Yomi Perera as dictated by Jimmie Mendez MD

## 2020-03-31 ENCOUNTER — OFFICE VISIT (OUTPATIENT)
Dept: ORTHOPEDIC SURGERY | Age: 70
End: 2020-03-31

## 2020-03-31 VITALS
OXYGEN SATURATION: 99 % | TEMPERATURE: 98.2 F | HEART RATE: 71 BPM | WEIGHT: 179.6 LBS | DIASTOLIC BLOOD PRESSURE: 87 MMHG | SYSTOLIC BLOOD PRESSURE: 157 MMHG | BODY MASS INDEX: 27.31 KG/M2 | RESPIRATION RATE: 16 BRPM

## 2020-03-31 DIAGNOSIS — G95.9 CERVICAL MYELOPATHY (HCC): Primary | ICD-10-CM

## 2020-03-31 DIAGNOSIS — M43.12 SPONDYLOLISTHESIS OF CERVICAL REGION: ICD-10-CM

## 2020-03-31 DIAGNOSIS — M48.062 LUMBAR STENOSIS WITH NEUROGENIC CLAUDICATION: ICD-10-CM

## 2020-03-31 NOTE — PROGRESS NOTES
Denilson Perea presents today for   Chief Complaint   Patient presents with    Back Pain     SC       Is someone accompanying this pt? NO    Is the patient using any DME equipment during OV? NO    Depression Screening:  3 most recent PHQ Screens 3/31/2020   PHQ Not Done -   Little interest or pleasure in doing things Not at all   Feeling down, depressed, irritable, or hopeless Not at all   Total Score PHQ 2 0       Learning Assessment:  Learning Assessment 2/8/2019   PRIMARY LEARNER Patient   PRIMARY LANGUAGE ENGLISH   LEARNER PREFERENCE PRIMARY READING   ANSWERED BY Patient   RELATIONSHIP SELF         Fall Risk  Fall Risk Assessment, last 12 mths 3/31/2020   Able to walk? Yes   Fall in past 12 months? -   Fall with injury? -   Number of falls in past 12 months -   Fall Risk Score -       Coordination of Care:  1. Have you been to the ER, urgent care clinic since your last visit? NO  Hospitalized since your last visit? NO    2. Have you seen or consulted any other health care providers outside of the [address] [place] [address] since your last visit? NO Include any pap smears or colon screening.  NO

## 2020-03-31 NOTE — PROGRESS NOTES
[address] [facility]   Pre-Surgical Worksheet    Patient: Esperanza Bishop                         MRN: [id number]     Age:  65 y.o.,      Sex: male    YOB: 1950           MITCH: March 31, 2020  PCP: Damian Mcdonald MD    No Known Allergies      ICD-10-CM ICD-9-CM    1. Cervical myelopathy (HCC) G95.9 721.1    2. Lumbar stenosis with neurogenic claudication M48.062 724.03 AMB POC XRAY, SPINE, LUMBOSACRAL; 2 O   3. Spondylolisthesis of cervical region M43.12 738.4 AMB POC XRAY, SPINE, CERVICAL; 4+ VIE       Surgery: ACDF C3/4. Pain Assessment   Pain Assessment  3/31/2020   Location of Pain Back   Location Modifiers -   Severity of Pain 2   Quality of Pain Aching   Quality of Pain Comment -   Duration of Pain -   Duration of Pain Comment -   Frequency of Pain Intermittent   Aggravating Factors Bending;Straightening;Standing;Walking   Aggravating Factors Comment -   Limiting Behavior -   Relieving Factors Rest;NSAID   Relieving Factors Comment PT   Result of Injury -   Work-Related Injury -   Type of Injury -       Visit Vitals  /87 (BP 1 Location: Left arm, BP Patient Position: Sitting)   Pulse 71   Temp 98.2 °F (36.8 °C) (Oral)   Resp 16   Wt 179 lb 9.6 oz (81.5 kg)   SpO2 99%   BMI 27.31 kg/m²       ADL Limits: dressing    Spine Surgery?: No:  When . Where. Spinal Injections?: No:   When . Where. Physical Therapy?: Yes  When -currently. Where- [address] [place] [address] Road. NSAID's?: Yes. Aspirin 81 mg daily, fish oil and krill oil daily    Pain Medications?: No:   Type: [facility] In Pain Management: NO, Where:     Current Outpatient Medications   Medication Sig    Promiseb crea as needed.  aspirin delayed-release 81 mg tablet Take 81 mg by mouth daily. Indications: takes daily after dinner    psyllium (METAMUCIL) powd Take  by mouth two (2) times a day.  gabapentin (NEURONTIN) 300 mg capsule Take 300 mg by mouth three (3) times daily.     krill-om-3-dha-epa-phospho-ast (KRILL OIL) 1,383-352-87-80 mg cap Take  by mouth.  tamsulosin (FLOMAX) 0.4 mg capsule Take 0.4 mg by mouth daily (after dinner).  cyanocobalamin 1,000 mcg tablet Take 1,000 mcg by mouth daily.  brimonidine (ALPHAGAN P) 0.1 % ophthalmic solution Administer 1 Drop to both eyes two (2) times a day.  acetaminophen (TYLENOL) 325 mg tablet Take 2 Tabs by mouth every six (6) hours as needed for Pain.  CINNAMON BARK (CINNAMON PO) Take  by mouth two (2) times a day.  CALCIUM PO Take  by mouth daily.  lycopene 10 mg cap Take  by mouth.  lutein 40 mg cap Take  by mouth.  omega-3 fatty acids-vitamin e (FISH OIL) 1,000 mg cap Take 1 Cap by mouth.  coenzyme q10-vitamin e (COQ10 ) 100-100 mg-unit cap Take  by mouth.  garlic cap Take  by mouth.  rosuvastatin (CRESTOR) 10 mg tablet Take 10 mg by mouth nightly.  benazepril (LOTENSIN) 5 mg tablet Take 10 mg by mouth daily.  saw palmetto 320 mg Cap Take  by mouth.  cholecalciferol, vitamin d3, (VITAMIN D3) 1,000 unit tablet Take  by mouth daily.  MULTIVITAMIN WITH MINERALS PO Take  by mouth. No current facility-administered medications for this visit. Past Medical History:   Diagnosis Date    Benign prostatic hyperplasia, presence of lower urinary tract symptoms unspecified     BPH (benign prostatic hyperplasia)     HLD (hyperlipidemia)     Hypercholesterolemia     Hypertension     Ill-defined condition     Spine   ( back) problem. ..goes for Physical Therapy    Macular degeneration     NAION (non-arteritic anterior ischemic optic neuropathy)     Post-void dribbling     Pseudophakia        Past Surgical History:   Procedure Laterality Date    ENDOSCOPY, COLON, DIAGNOSTIC      HC IMPLANT PROSTATE UROLIFT [facility] USE ONLY  3/17/2020         HX CATARACT REMOVAL Bilateral     HX HEENT Bilateral     eyelid surgery, corrective    HX PROSTATE SURGERY  3/17/2020    HX SHOULDER REPLACEMENT Right 02/2018    HX SHOULDER REPLACEMENT Left 01/07/2019    left shoulder replacement        Social History     Socioeconomic History    Marital status: SINGLE     Spouse name: Not on file    Number of children: Not on file    Years of education: Not on file    Highest education level: Not on file   Tobacco Use    Smoking status: Never Smoker    Smokeless tobacco: Never Used   Substance and Sexual Activity    Alcohol use:  Yes     Alcohol/week: 1.0 standard drinks     Types: 1 Glasses of wine per week     Comment: social    Drug use: Never

## 2020-03-31 NOTE — LETTER
3/31/20 Patient: Naheed Adrian YOB: 1950 Date of Visit: 3/31/2020 Luz Marina Reyna MD 
[address] [address] [address] VIA Facsimile: [phone] Dear Luz Marina Reyna MD, Thank you for referring Mr. Diomedes Gonzalez to [address] [place] [address] for evaluation. My notes for this consultation are attached. If you have questions, please do not hesitate to call me. I look forward to following your patient along with you.  
 
 
Sincerely, 
 
Dino Calvert MD

## 2020-03-31 NOTE — PROGRESS NOTES
[address], [address]  [place], [address] [place] [address] Street  Phone: [phone]  Fax: [phone]  INITIAL CONSULTATION  Patient: Ketty Bella                MRN: [id number]       SSN: [id number]  YOB: 1950        AGE: 65 y.o. SEX: male  Body mass index is 27.31 kg/m². PCP: Dayanna Reyes MD  03/31/20    Chief Complaint   Patient presents with    Back Pain     SC         HISTORY OF PRESENT ILLNESS, RADIOGRAPHS, and PLAN:        that is seen today progressive left elbow therapy since that is 70-year-old male retired with history of macular degeneration hypertension retired . He has had chronic left-sided low back pain aggravated his flank some degree in his left leg he notices balance is off minor dexterity issues. Recent prostate surgery. Furred for further evaluation. He was found to be hyperreflexic. MRIs were obtained of the cervical spine. Longstanding history of polyarticular arthritis shoulder surgeries etc.  His physical exam is significant for positive Gurrola's and poor balance. Nonspecific left-sided flank pain radiating diffusely into his left leg. X-rays demonstrate degenerative scoliosis advanced inflammatory arthritis throughout his spine at C3-4 there is an anterolisthesis about 4 mm with fused segments below. He is autofused degenerative segments. Lumbar spine advanced degenerative changes disc space collapse. MRI of cervical spine demonstrates severe cervical stenosis at C3-4 with instability and stenosis signal change to the cord in his low thoracic spine he has degenerative changes in the facets left worse than right causing lateral recess and central stenosis no low thoracic spine. He also has diffuse degenerative changes in his lumbar spine causing classic spinal stenosis most severe at L4-5 and the present at L2 35[address]. I discussed the matter at length with him.     Assessment plan believe the patient suffering from cervical spondylitic myelopathy with instability primarily from his C3-4 segment though he has degenerative changes and some evidence of stenosis at segments below. 3 4 is the concerning level in the area with signal change. Is having minimal true neck symptoms. Treatment for this type of thing can be done as an anterior cervical decompression fusion at 3 4 though the degree of posterior ligamentous issue is such that a posterior procedure may be indicated depending upon findings at the time of the anterior surgery and his symptom resolution. I had to go and posteriorly I probably need to address multiple segments is much more painful and complex surgery will try to avoid a posterior approach if possible. With regards to his left-sided flank and leg pain it is hard to tell whether this is due to thoracic myelo radiculopathy on the left due to his thoracic stenosis due to lumbar spinal stenosis or combination of the 2. I would treat this secondarily the patient's symptoms remained with low thoracic laminectomy and probably a unilateral multilevel lumbar decompression. I discussed risks benefits complications and alternatives to surgery. I discussed her current concerns with the Kovic virus crisis. Because of his gait problems his fear of falling and his progressive symptomatology he would like to address his neck now which I think is prudent and urgent  This dictation was created utilizing voice recognition software. Errors may be present. Past Medical History:   Diagnosis Date    Benign prostatic hyperplasia, presence of lower urinary tract symptoms unspecified     BPH (benign prostatic hyperplasia)     HLD (hyperlipidemia)     Hypercholesterolemia     Hypertension     Ill-defined condition     Spine   ( back) problem. ..goes for Physical Therapy    Macular degeneration     NAION (non-arteritic anterior ischemic optic neuropathy)     Post-void dribbling     Pseudophakia        Family History   Problem Relation Age of Onset    Cancer Mother     Heart Disease Father        Current Outpatient Medications   Medication Sig Dispense Refill    Promiseb crea as needed.  aspirin delayed-release 81 mg tablet Take 81 mg by mouth daily. Indications: takes daily after dinner      psyllium (METAMUCIL) powd Take  by mouth two (2) times a day.  gabapentin (NEURONTIN) 300 mg capsule Take 300 mg by mouth three (3) times daily.  krill-om-3-dha-epa-phospho-ast (KRILL OIL) 1,691-635-37-80 mg cap Take  by mouth.  tamsulosin (FLOMAX) 0.4 mg capsule Take 0.4 mg by mouth daily (after dinner).  cyanocobalamin 1,000 mcg tablet Take 1,000 mcg by mouth daily.  brimonidine (ALPHAGAN P) 0.1 % ophthalmic solution Administer 1 Drop to both eyes two (2) times a day.  acetaminophen (TYLENOL) 325 mg tablet Take 2 Tabs by mouth every six (6) hours as needed for Pain. 60 Tab 0    CINNAMON BARK (CINNAMON PO) Take  by mouth two (2) times a day.  CALCIUM PO Take  by mouth daily.  lycopene 10 mg cap Take  by mouth.  lutein 40 mg cap Take  by mouth.  omega-3 fatty acids-vitamin e (FISH OIL) 1,000 mg cap Take 1 Cap by mouth.  coenzyme q10-vitamin e (COQ10 ) 100-100 mg-unit cap Take  by mouth.  garlic cap Take  by mouth.  rosuvastatin (CRESTOR) 10 mg tablet Take 10 mg by mouth nightly.  benazepril (LOTENSIN) 5 mg tablet Take 10 mg by mouth daily.  saw palmetto 320 mg Cap Take  by mouth.  cholecalciferol, vitamin d3, (VITAMIN D3) 1,000 unit tablet Take  by mouth daily.  MULTIVITAMIN WITH MINERALS PO Take  by mouth.            No Known Allergies    Past Surgical History:   Procedure Laterality Date    ENDOSCOPY, COLON, DIAGNOSTIC      HC IMPLANT PROSTATE UROLIFT [facility] USE ONLY  3/17/2020         HX CATARACT REMOVAL Bilateral     HX HEENT Bilateral     eyelid surgery, corrective    HX PROSTATE SURGERY 3/17/2020    HX SHOULDER REPLACEMENT Right 02/2018    HX SHOULDER REPLACEMENT Left 01/07/2019    left shoulder replacement        Past Medical History:   Diagnosis Date    Benign prostatic hyperplasia, presence of lower urinary tract symptoms unspecified     BPH (benign prostatic hyperplasia)     HLD (hyperlipidemia)     Hypercholesterolemia     Hypertension     Ill-defined condition     Spine   ( back) problem. ..goes for Physical Therapy    Macular degeneration     NAION (non-arteritic anterior ischemic optic neuropathy)     Post-void dribbling     Pseudophakia        Social History     Socioeconomic History    Marital status: SINGLE     Spouse name: Not on file    Number of children: Not on file    Years of education: Not on file    Highest education level: Not on file   Occupational History    Not on file   Social Needs    Financial resource strain: Not on file    Food insecurity     Worry: Not on file     Inability: Not on file    Transportation needs     Medical: Not on file     Non-medical: Not on file   Tobacco Use    Smoking status: Never Smoker    Smokeless tobacco: Never Used   Substance and Sexual Activity    Alcohol use:  Yes     Alcohol/week: 1.0 standard drinks     Types: 1 Glasses of wine per week     Comment: social    Drug use: Never    Sexual activity: Not on file   Lifestyle    Physical activity     Days per week: Not on file     Minutes per session: Not on file    Stress: Not on file   Relationships    Social connections     Talks on phone: Not on file     Gets together: Not on file     Attends Jain service: Not on file     Active member of club or organization: Not on file     Attends meetings of clubs or organizations: Not on file     Relationship status: Not on file    Intimate partner violence     Fear of current or ex partner: Not on file     Emotionally abused: Not on file     Physically abused: Not on file     Forced sexual activity: Not on file   Other Topics Concern    Not on file   Social History Narrative    Not on file           REVIEW OF SYSTEMS:   CONSTITUTIONAL SYMPTOMS:  Negative. EYES:  Negative. EARS, NOSE, THROAT AND MOUTH:  Negative. CARDIOVASCULAR:  Negative. RESPIRATORY:  Negative. GENITOURINARY: Per HPI. GASTROINTESTINAL:  Per HPI. INTEGUMENTARY (SKIN AND/OR BREAST):  Negative. MUSCULOSKELETAL: Per HPI.   ENDOCRINE/RHEUMATOLOGIC:  Negative. NEUROLOGICAL:  Per HPI. HEMATOLOGIC/LYMPHATIC:  Negative. ALLERGIC/IMMUNOLOGIC:  Negative. PSYCHIATRIC:  Negative. PHYSICAL EXAMINATION:   Visit Vitals  /87 (BP 1 Location: Left arm, BP Patient Position: Sitting)   Pulse 71   Temp 98.2 °F (36.8 °C) (Oral)   Resp 16   Wt 179 lb 9.6 oz (81.5 kg)   SpO2 99%   BMI 27.31 kg/m²    PAIN SCALE: 2/10    CONSTITUTIONAL: The patient is in no apparent distress and is alert and oriented x 3. HEENT: Normocephalic. Hearing grossly intact. NECK: Supple and symmetric. no tenderness, or masses were felt. RESPIRATORY: No labored breathing. CARDIOVASCULAR: The carotid pulses were normal. Peripheral pulses were 2+. CHEST: Normal AP diameter and normal contour without any kyphoscoliosis. LYMPHATIC: No lymphadenopathy was appreciated in the neck, axillae or groin. SKIN:  Negative for scars, rashes, lesions, or ulcers on the right upper, right lower, left upper, left lower and trunk. NEUROLOGICAL: Alert and oriented x 3. Ambulation without assistive device. FWB. EXTREMITIES:  See musculoskeletal.  MUSCULOSKELETAL:   Head and Neck: Neck pain. Negative for misalignment, asymmetry, crepitation, defects, tenderness masses or effusions.  Left Upper Extremity: Inspection, percussion and palpation performed. Gurrolas sign is positive.  Right Upper Extremity: Inspection, percussion and palpation performed. Gurrolas sign is positive.  Spine, Ribs and Pelvis: Left-sided back pain. Inspection, percussion and palpation performed. Negative for misalignment, asymmetry, crepitation, defects, tenderness masses or effusions.  Left Lower Extremity: Inspection, percussion and palpation performed. Negative straight leg raise.  Right Lower Extremity: Inspection, percussion and palpation performed. Negative straight leg raise. SPINE EXAM:     Cervical spine: Neck is midline. Normal muscle tone. No focal atrophy is noted. Lumbar spine: No rash, ecchymosis, or gross obliquity. No focal atrophy is noted. ASSESSMENT    ICD-10-CM ICD-9-CM    1. Spondylolisthesis of cervical region M43.12 738.4 AMB POC XRAY, SPINE, CERVICAL; 4+ VIE   2. Lumbar stenosis with neurogenic claudication M48.062 724.03 AMB POC XRAY, SPINE, LUMBOSACRAL; 2 O       Written by Stephanie Abdullahi, as dictated by Ladarius Hunter MD.    I, Dr. Ladarius Hunter MD, confirm that all documentation is accurate.

## 2020-04-03 ENCOUNTER — ANESTHESIA EVENT (OUTPATIENT)
Dept: SURGERY | Age: 70
End: 2020-04-03
Payer: MEDICARE

## 2020-04-03 NOTE — H&P
Pre-Admission History and Physical    Patient: Sugey Balderrama   MRN: [id number]   SSN: [id number]   YOB: 1950   Age: 65 y.o. Sex: male     Patient scheduled for:ACDF C3-4. Date of surgery: 4/6/2020. Location of surgery: [facility]. Surgeon: Abundio Knox MD    HPI:  Sugey Balderrama is a 65 y.o. male with history of macular degeneration hypertension and is a retired . He has had chronic left-sided low back pain aggravated his flank some degree in his left leg he notices balance is off minor dexterity issues. He was found to be hyperreflexic. MRIs were obtained of the cervical spine. Longstanding history of polyarticular arthritis shoulder surgeries etc.  His physical exam is significant for positive Gurrola's and poor balance. Nonspecific left-sided flank pain radiating diffusely into his left leg. X-rays demonstrate degenerative scoliosis advanced inflammatory arthritis throughout his spine at C3-4 there is an anterolisthesis about 4 mm with fused segments below. He is autofused degenerative segments. Lumbar spine advanced degenerative changes disc space collapse. MRI of cervical spine demonstrates severe cervical stenosis at C3-4 with instability and stenosis signal change to the cord in his low thoracic spine he has degenerative changes in the facets left worse than right causing lateral recess and central stenosis no low thoracic spine. He also has diffuse degenerative changes in his lumbar spine causing classic spinal stenosis most severe at L4-5 and the present at L2 94773. believe the patient suffering from cervical spondylitic myelopathy with instability primarily from his C3-4 segment though he has degenerative changes and some evidence of stenosis at segments below. 3 4 is the concerning level in the area with signal change. Is having minimal true neck symptoms.   Treatment for this type of thing can be done as an anterior cervical decompression fusion at 3 4 though the degree of posterior ligamentous issue is such that a posterior procedure may be indicated depending upon findings at the time of the anterior surgery and his symptom resolution. I had to go and posteriorly I probably need to address multiple segments is much more painful and complex surgery will try to avoid a posterior approach if possible. With regards to his left-sided flank and leg pain it is hard to tell whether this is due to thoracic myelo radiculopathy on the left due to his thoracic stenosis due to lumbar spinal stenosis or combination of the 2. I would treat this secondarily the patient's symptoms remained with low thoracic laminectomy and probably a unilateral multilevel lumbar decompression. I discussed her current concerns with the Covid 19 virus crisis. Because of his gait problems his fear of falling and his progressive symptomatology, he would like to address his neck now which I think is prudent and urgent  He reports a pain level of 2/10. He is being admitted for surgical intervention. Past Medical History:   Diagnosis Date    Benign prostatic hyperplasia, presence of lower urinary tract symptoms unspecified     BPH (benign prostatic hyperplasia)     HLD (hyperlipidemia)     Hypercholesterolemia     Hypertension     Ill-defined condition     Spine   ( back) problem. ..goes for Physical Therapy    Macular degeneration     NAION (non-arteritic anterior ischemic optic neuropathy)     Post-void dribbling     Pseudophakia      Social History     Socioeconomic History    Marital status: SINGLE     Spouse name: Not on file    Number of children: Not on file    Years of education: Not on file    Highest education level: Not on file   Tobacco Use    Smoking status: Never Smoker    Smokeless tobacco: Never Used   Substance and Sexual Activity    Alcohol use:  Yes     Alcohol/week: 1.0 standard drinks     Types: 1 Glasses of wine per week Comment: social    Drug use: Never     Past Surgical History:   Procedure Laterality Date    ENDOSCOPY, COLON, DIAGNOSTIC      HC IMPLANT PROSTATE UROLIFT [facility] USE ONLY  3/17/2020         HX CATARACT REMOVAL Bilateral     HX HEENT Bilateral     eyelid surgery, corrective    HX PROSTATE SURGERY  3/17/2020    HX SHOULDER REPLACEMENT Right 02/2018    HX SHOULDER REPLACEMENT Left 01/07/2019    left shoulder replacement      Family History   Problem Relation Age of Onset    Cancer Mother     Heart Disease Father      No Known Allergies  Current Outpatient Medications   Medication Sig Dispense Refill    Promiseb crea as needed.  aspirin delayed-release 81 mg tablet Take 81 mg by mouth daily. Indications: takes daily after dinner      psyllium (METAMUCIL) powd Take  by mouth two (2) times a day.  gabapentin (NEURONTIN) 300 mg capsule Take 300 mg by mouth three (3) times daily.  krill-om-3-dha-epa-phospho-ast (KRILL OIL) 1,813-267-85-80 mg cap Take  by mouth.  tamsulosin (FLOMAX) 0.4 mg capsule Take 0.4 mg by mouth daily (after dinner).  cyanocobalamin 1,000 mcg tablet Take 1,000 mcg by mouth daily.  brimonidine (ALPHAGAN P) 0.1 % ophthalmic solution Administer 1 Drop to both eyes two (2) times a day.  acetaminophen (TYLENOL) 325 mg tablet Take 2 Tabs by mouth every six (6) hours as needed for Pain. 60 Tab 0    CINNAMON BARK (CINNAMON PO) Take  by mouth two (2) times a day.  CALCIUM PO Take  by mouth daily.  lycopene 10 mg cap Take  by mouth.  lutein 40 mg cap Take  by mouth.  omega-3 fatty acids-vitamin e (FISH OIL) 1,000 mg cap Take 1 Cap by mouth.  coenzyme q10-vitamin e (COQ10 ) 100-100 mg-unit cap Take  by mouth.  garlic cap Take  by mouth.  rosuvastatin (CRESTOR) 10 mg tablet Take 10 mg by mouth nightly.  benazepril (LOTENSIN) 5 mg tablet Take 10 mg by mouth daily.  saw palmetto 320 mg Cap Take  by mouth.         cholecalciferol, vitamin d3, (VITAMIN D3) 1,000 unit tablet Take  by mouth daily.  MULTIVITAMIN WITH MINERALS PO Take  by mouth. ROS:  Denies chills, fever,night sweats,  bowel or bladder dysfunction, unexplained weight loss/weight gain, chest pain, sob or anxiety. Physical Examination    Gen: Well developed, well nourished 65 y.o. male Visit Vitals  /87 (BP 1 Location: Left arm, BP Patient Position: Sitting)   Pulse 71   Temp 98.2 °F (36.8 °C) (Oral)   Resp 16   Wt 179 lb 9.6 oz (81.5 kg)   SpO2 99%   BMI 27.31 kg/m²    PAIN SCALE: 2/10     CONSTITUTIONAL: The patient is in no apparent distress and is alert and oriented x 3. HEENT: Normocephalic. Hearing grossly intact. NECK: Supple and symmetric. no tenderness, or masses were felt. RESPIRATORY: No labored breathing. CARDIOVASCULAR: The carotid pulses were normal. Peripheral pulses were 2+. CHEST: Normal AP diameter and normal contour without any kyphoscoliosis. LYMPHATIC: No lymphadenopathy was appreciated in the neck, axillae or groin. SKIN:  Negative for scars, rashes, lesions, or ulcers on the right upper, right lower, left upper, left lower and trunk. NEUROLOGICAL: Alert and oriented x 3. Ambulation without assistive device. FWB. EXTREMITIES:  See musculoskeletal.  MUSCULOSKELETAL:  · Head and Neck: Neck pain. Negative for misalignment, asymmetry, crepitation, defects, tenderness masses or effusions. · Left Upper Extremity: Inspection, percussion and palpation performed. Gurrolas sign is positive. · Right Upper Extremity: Inspection, percussion and palpation performed. Gurrolas sign is positive. · Spine, Ribs and Pelvis: Left-sided back pain. Inspection, percussion and palpation performed. Negative for misalignment, asymmetry, crepitation, defects, tenderness masses or effusions. · Left Lower Extremity: Inspection, percussion and palpation performed. Negative straight leg raise.   · Right Lower Extremity: Inspection, percussion and palpation performed. Negative straight leg raise.        SPINE EXAM:      Cervical spine: Neck is midline. Normal muscle tone. No focal atrophy is noted.     Lumbar spine: No rash, ecchymosis, or gross obliquity. No focal atrophy is noted.             Assessment and Plan    Due to the pt's persistent symptoms unrelieved by conservative measure Ezra Quick is being admitted to DR. [facility]"" to undergo surgical intervention. The post-operative plan of care consists of physical therapy, home health and a 2 week f/u office visit. We are pending medical clearance by Dr. Sami Grossman. The risks, benefits, complications and alternatives to surgery have been discussed in detail with the patient. The patient understands and agrees to proceed.      Zaria Henley, NP-C for Missy Ruelas MD

## 2020-04-06 ENCOUNTER — HOSPITAL ENCOUNTER (OUTPATIENT)
Age: 70
Setting detail: OUTPATIENT SURGERY
Discharge: HOME | End: 2020-04-06
Attending: ORTHOPAEDIC SURGERY | Admitting: ORTHOPAEDIC SURGERY
Payer: MEDICARE

## 2020-04-06 ENCOUNTER — APPOINTMENT (OUTPATIENT)
Dept: GENERAL RADIOLOGY | Age: 70
End: 2020-04-06
Attending: ORTHOPAEDIC SURGERY
Payer: MEDICARE

## 2020-04-06 ENCOUNTER — ANESTHESIA (OUTPATIENT)
Dept: SURGERY | Age: 70
End: 2020-04-06
Payer: MEDICARE

## 2020-04-06 VITALS
HEART RATE: 79 BPM | TEMPERATURE: 97.5 F | OXYGEN SATURATION: 94 % | SYSTOLIC BLOOD PRESSURE: 152 MMHG | WEIGHT: 179.5 LBS | BODY MASS INDEX: 27.2 KG/M2 | HEIGHT: 68 IN | RESPIRATION RATE: 25 BRPM | DIASTOLIC BLOOD PRESSURE: 86 MMHG

## 2020-04-06 DIAGNOSIS — Z98.1 S/P CERVICAL SPINAL FUSION: Primary | ICD-10-CM

## 2020-04-06 PROBLEM — G95.9 CERVICAL MYELOPATHY (HCC): Status: ACTIVE | Noted: 2020-04-06

## 2020-04-06 LAB — GLUCOSE BLD STRIP.AUTO-MCNC: 100 MG/DL (ref 70–110)

## 2020-04-06 PROCEDURE — 77030011267 HC ELECTRD BLD COVD -A: Performed by: ORTHOPAEDIC SURGERY

## 2020-04-06 PROCEDURE — L0120 CERV FLEX N/ADJ FOAM PRE OTS: HCPCS | Performed by: ORTHOPAEDIC SURGERY

## 2020-04-06 PROCEDURE — 74011250636 HC RX REV CODE- 250/636: Performed by: NURSE ANESTHETIST, CERTIFIED REGISTERED

## 2020-04-06 PROCEDURE — 77030012406 HC DRN WND PENRS BARD -A: Performed by: ORTHOPAEDIC SURGERY

## 2020-04-06 PROCEDURE — C1713 ANCHOR/SCREW BN/BN,TIS/BN: HCPCS | Performed by: ORTHOPAEDIC SURGERY

## 2020-04-06 PROCEDURE — 74011000250 HC RX REV CODE- 250: Performed by: ORTHOPAEDIC SURGERY

## 2020-04-06 PROCEDURE — 76210000026 HC REC RM PH II 1 TO 1.5 HR: Performed by: ORTHOPAEDIC SURGERY

## 2020-04-06 PROCEDURE — 76010000149 HC OR TIME 1 TO 1.5 HR: Performed by: ORTHOPAEDIC SURGERY

## 2020-04-06 PROCEDURE — 77030034475 HC MISC IMPL SPN: Performed by: ORTHOPAEDIC SURGERY

## 2020-04-06 PROCEDURE — 74011000272 HC RX REV CODE- 272: Performed by: ORTHOPAEDIC SURGERY

## 2020-04-06 PROCEDURE — 77030010512 HC APPL CLP LIG J&J -C: Performed by: ORTHOPAEDIC SURGERY

## 2020-04-06 PROCEDURE — 77030013567 HC DRN WND RESERV BARD -A: Performed by: ORTHOPAEDIC SURGERY

## 2020-04-06 PROCEDURE — 77030031139 HC SUT VCRL2 J&J -A: Performed by: ORTHOPAEDIC SURGERY

## 2020-04-06 PROCEDURE — 77030027960 HC SPCR CERV VIKOS K2M -G1: Performed by: ORTHOPAEDIC SURGERY

## 2020-04-06 PROCEDURE — 99218 HC RM OBSERVATION: CPT

## 2020-04-06 PROCEDURE — 82962 GLUCOSE BLOOD TEST: CPT

## 2020-04-06 PROCEDURE — 77030039266 HC ADH SKN EXOFIN S2SG -A: Performed by: ORTHOPAEDIC SURGERY

## 2020-04-06 PROCEDURE — 76060000033 HC ANESTHESIA 1 TO 1.5 HR: Performed by: ORTHOPAEDIC SURGERY

## 2020-04-06 PROCEDURE — 77030012890: Performed by: ORTHOPAEDIC SURGERY

## 2020-04-06 PROCEDURE — 74011250636 HC RX REV CODE- 250/636: Performed by: ANESTHESIOLOGY

## 2020-04-06 PROCEDURE — 74011250637 HC RX REV CODE- 250/637: Performed by: NURSE ANESTHETIST, CERTIFIED REGISTERED

## 2020-04-06 PROCEDURE — 77030004402 HC BUR NEUR STRY -C: Performed by: ORTHOPAEDIC SURGERY

## 2020-04-06 PROCEDURE — 77030040361 HC SLV COMPR DVT MDII -B: Performed by: ORTHOPAEDIC SURGERY

## 2020-04-06 PROCEDURE — 76210000001 HC OR PH I REC 2.5 TO 3 HR: Performed by: ORTHOPAEDIC SURGERY

## 2020-04-06 PROCEDURE — 74011250636 HC RX REV CODE- 250/636

## 2020-04-06 PROCEDURE — 77030029099 HC BN WAX SSPC -A: Performed by: ORTHOPAEDIC SURGERY

## 2020-04-06 PROCEDURE — 74011250636 HC RX REV CODE- 250/636: Performed by: ORTHOPAEDIC SURGERY

## 2020-04-06 DEVICE — IMPLANTABLE DEVICE: Type: IMPLANTABLE DEVICE | Site: SPINE CERVICAL | Status: FUNCTIONAL

## 2020-04-06 DEVICE — SCREW SPNL L16MM DIA4MM SELF STARTING VAR ANT CERV TI OZARK: Type: IMPLANTABLE DEVICE | Site: SPINE CERVICAL | Status: FUNCTIONAL

## 2020-04-06 DEVICE — Z DUP USE 2717610 GRAFT SPNL W14XH8XL11MM CERV LORD W PLUG VIKOS: Type: IMPLANTABLE DEVICE | Site: SPINE CERVICAL | Status: FUNCTIONAL

## 2020-04-06 RX ORDER — MAGNESIUM SULFATE 100 %
4 CRYSTALS MISCELLANEOUS AS NEEDED
Status: DISCONTINUED | OUTPATIENT
Start: 2020-04-06 | End: 2020-04-06 | Stop reason: HOSPADM

## 2020-04-06 RX ORDER — FAMOTIDINE 20 MG/1
20 TABLET, FILM COATED ORAL ONCE
Status: COMPLETED | OUTPATIENT
Start: 2020-04-06 | End: 2020-04-06

## 2020-04-06 RX ORDER — OXYCODONE HYDROCHLORIDE 5 MG/1
5 TABLET ORAL
Qty: 28 TAB | Refills: 0 | Status: SHIPPED | OUTPATIENT
Start: 2020-04-06 | End: 2020-04-13

## 2020-04-06 RX ORDER — CEFAZOLIN SODIUM 2 G/50ML
2 SOLUTION INTRAVENOUS ONCE
Status: DISCONTINUED | OUTPATIENT
Start: 2020-04-06 | End: 2020-04-06 | Stop reason: HOSPADM

## 2020-04-06 RX ORDER — HYDROMORPHONE HYDROCHLORIDE 2 MG/ML
0.5 INJECTION, SOLUTION INTRAMUSCULAR; INTRAVENOUS; SUBCUTANEOUS
Status: COMPLETED | OUTPATIENT
Start: 2020-04-06 | End: 2020-04-06

## 2020-04-06 RX ORDER — ROCURONIUM BROMIDE 10 MG/ML
INJECTION, SOLUTION INTRAVENOUS AS NEEDED
Status: DISCONTINUED | OUTPATIENT
Start: 2020-04-06 | End: 2020-04-06 | Stop reason: HOSPADM

## 2020-04-06 RX ORDER — SODIUM CHLORIDE 0.9 % (FLUSH) 0.9 %
5-40 SYRINGE (ML) INJECTION EVERY 8 HOURS
Status: DISCONTINUED | OUTPATIENT
Start: 2020-04-06 | End: 2020-04-06 | Stop reason: HOSPADM

## 2020-04-06 RX ORDER — METHYLENE BLUE 10 MG/ML
INJECTION INTRAVENOUS AS NEEDED
Status: DISCONTINUED | OUTPATIENT
Start: 2020-04-06 | End: 2020-04-06 | Stop reason: HOSPADM

## 2020-04-06 RX ORDER — DEXTROSE 50 % IN WATER (D50W) INTRAVENOUS SYRINGE
25-50 AS NEEDED
Status: DISCONTINUED | OUTPATIENT
Start: 2020-04-06 | End: 2020-04-06 | Stop reason: HOSPADM

## 2020-04-06 RX ORDER — PROPOFOL 10 MG/ML
INJECTION, EMULSION INTRAVENOUS AS NEEDED
Status: DISCONTINUED | OUTPATIENT
Start: 2020-04-06 | End: 2020-04-06 | Stop reason: HOSPADM

## 2020-04-06 RX ORDER — SODIUM CHLORIDE, SODIUM LACTATE, POTASSIUM CHLORIDE, CALCIUM CHLORIDE 600; 310; 30; 20 MG/100ML; MG/100ML; MG/100ML; MG/100ML
INJECTION, SOLUTION INTRAVENOUS
Status: DISCONTINUED | OUTPATIENT
Start: 2020-04-06 | End: 2020-04-06 | Stop reason: HOSPADM

## 2020-04-06 RX ORDER — LABETALOL HCL 20 MG/4 ML
10 SYRINGE (ML) INTRAVENOUS ONCE
Status: COMPLETED | OUTPATIENT
Start: 2020-04-06 | End: 2020-04-06

## 2020-04-06 RX ORDER — ONDANSETRON 2 MG/ML
4 INJECTION INTRAMUSCULAR; INTRAVENOUS ONCE
Status: COMPLETED | OUTPATIENT
Start: 2020-04-06 | End: 2020-04-06

## 2020-04-06 RX ORDER — VANCOMYCIN HYDROCHLORIDE 1 G/20ML
INJECTION, POWDER, LYOPHILIZED, FOR SOLUTION INTRAVENOUS AS NEEDED
Status: DISCONTINUED | OUTPATIENT
Start: 2020-04-06 | End: 2020-04-06 | Stop reason: HOSPADM

## 2020-04-06 RX ORDER — SODIUM CHLORIDE 0.9 % (FLUSH) 0.9 %
5-40 SYRINGE (ML) INJECTION AS NEEDED
Status: DISCONTINUED | OUTPATIENT
Start: 2020-04-06 | End: 2020-04-06 | Stop reason: HOSPADM

## 2020-04-06 RX ORDER — CEFAZOLIN SODIUM IN 0.9 % NACL 2 G/100 ML
PLASTIC BAG, INJECTION (ML) INTRAVENOUS AS NEEDED
Status: DISCONTINUED | OUTPATIENT
Start: 2020-04-06 | End: 2020-04-06 | Stop reason: HOSPADM

## 2020-04-06 RX ORDER — LABETALOL HCL 20 MG/4 ML
SYRINGE (ML) INTRAVENOUS
Status: DISCONTINUED
Start: 2020-04-06 | End: 2020-04-06 | Stop reason: HOSPADM

## 2020-04-06 RX ORDER — LIDOCAINE HYDROCHLORIDE 10 MG/ML
0.1 INJECTION, SOLUTION EPIDURAL; INFILTRATION; INTRACAUDAL; PERINEURAL AS NEEDED
Status: DISCONTINUED | OUTPATIENT
Start: 2020-04-06 | End: 2020-04-06 | Stop reason: HOSPADM

## 2020-04-06 RX ORDER — SODIUM CHLORIDE, SODIUM LACTATE, POTASSIUM CHLORIDE, CALCIUM CHLORIDE 600; 310; 30; 20 MG/100ML; MG/100ML; MG/100ML; MG/100ML
25 INJECTION, SOLUTION INTRAVENOUS CONTINUOUS
Status: DISCONTINUED | OUTPATIENT
Start: 2020-04-06 | End: 2020-04-06 | Stop reason: HOSPADM

## 2020-04-06 RX ORDER — DEXAMETHASONE SODIUM PHOSPHATE 4 MG/ML
INJECTION, SOLUTION INTRA-ARTICULAR; INTRALESIONAL; INTRAMUSCULAR; INTRAVENOUS; SOFT TISSUE AS NEEDED
Status: DISCONTINUED | OUTPATIENT
Start: 2020-04-06 | End: 2020-04-06 | Stop reason: HOSPADM

## 2020-04-06 RX ORDER — EPHEDRINE SULFATE/0.9% NACL/PF 50 MG/5 ML
SYRINGE (ML) INTRAVENOUS AS NEEDED
Status: DISCONTINUED | OUTPATIENT
Start: 2020-04-06 | End: 2020-04-06 | Stop reason: HOSPADM

## 2020-04-06 RX ORDER — FENTANYL CITRATE 50 UG/ML
INJECTION, SOLUTION INTRAMUSCULAR; INTRAVENOUS AS NEEDED
Status: DISCONTINUED | OUTPATIENT
Start: 2020-04-06 | End: 2020-04-06 | Stop reason: HOSPADM

## 2020-04-06 RX ORDER — SUCCINYLCHOLINE CHLORIDE 20 MG/ML
INJECTION INTRAMUSCULAR; INTRAVENOUS AS NEEDED
Status: DISCONTINUED | OUTPATIENT
Start: 2020-04-06 | End: 2020-04-06 | Stop reason: HOSPADM

## 2020-04-06 RX ORDER — NEOSTIGMINE METHYLSULFATE 1 MG/ML
INJECTION, SOLUTION INTRAVENOUS AS NEEDED
Status: DISCONTINUED | OUTPATIENT
Start: 2020-04-06 | End: 2020-04-06 | Stop reason: HOSPADM

## 2020-04-06 RX ORDER — GLYCOPYRROLATE 0.2 MG/ML
INJECTION INTRAMUSCULAR; INTRAVENOUS AS NEEDED
Status: DISCONTINUED | OUTPATIENT
Start: 2020-04-06 | End: 2020-04-06 | Stop reason: HOSPADM

## 2020-04-06 RX ADMIN — FENTANYL CITRATE 100 MCG: 50 INJECTION, SOLUTION INTRAMUSCULAR; INTRAVENOUS at 10:42

## 2020-04-06 RX ADMIN — Medication 2 G: at 10:42

## 2020-04-06 RX ADMIN — GLYCOPYRROLATE 0.4 MG: 0.2 INJECTION INTRAMUSCULAR; INTRAVENOUS at 11:34

## 2020-04-06 RX ADMIN — Medication 10 ML: at 09:37

## 2020-04-06 RX ADMIN — DEXAMETHASONE SODIUM PHOSPHATE 10 MG: 4 INJECTION, SOLUTION INTRA-ARTICULAR; INTRALESIONAL; INTRAMUSCULAR; INTRAVENOUS; SOFT TISSUE at 10:55

## 2020-04-06 RX ADMIN — HYDROMORPHONE HYDROCHLORIDE 0.5 MG: 2 INJECTION, SOLUTION INTRAMUSCULAR; INTRAVENOUS; SUBCUTANEOUS at 12:29

## 2020-04-06 RX ADMIN — FENTANYL CITRATE 50 MCG: 50 INJECTION, SOLUTION INTRAMUSCULAR; INTRAVENOUS at 11:39

## 2020-04-06 RX ADMIN — FENTANYL CITRATE 50 MCG: 50 INJECTION, SOLUTION INTRAMUSCULAR; INTRAVENOUS at 11:34

## 2020-04-06 RX ADMIN — SODIUM CHLORIDE, SODIUM LACTATE, POTASSIUM CHLORIDE, AND CALCIUM CHLORIDE 25 ML/HR: 600; 310; 30; 20 INJECTION, SOLUTION INTRAVENOUS at 09:37

## 2020-04-06 RX ADMIN — HYDROMORPHONE HYDROCHLORIDE 0.5 MG: 2 INJECTION, SOLUTION INTRAMUSCULAR; INTRAVENOUS; SUBCUTANEOUS at 12:19

## 2020-04-06 RX ADMIN — LABETALOL 20 MG/4 ML (5 MG/ML) INTRAVENOUS SYRINGE 10 MG: at 13:35

## 2020-04-06 RX ADMIN — NEOSTIGMINE METHYLSULFATE 3 MG: 1 INJECTION, SOLUTION INTRAVENOUS at 11:34

## 2020-04-06 RX ADMIN — FAMOTIDINE 20 MG: 20 TABLET ORAL at 09:37

## 2020-04-06 RX ADMIN — PROPOFOL 200 MG: 10 INJECTION, EMULSION INTRAVENOUS at 10:42

## 2020-04-06 RX ADMIN — HYDROMORPHONE HYDROCHLORIDE 0.5 MG: 2 INJECTION, SOLUTION INTRAMUSCULAR; INTRAVENOUS; SUBCUTANEOUS at 12:09

## 2020-04-06 RX ADMIN — SUCCINYLCHOLINE CHLORIDE 140 MG: 20 INJECTION INTRAMUSCULAR; INTRAVENOUS at 10:42

## 2020-04-06 RX ADMIN — ROCURONIUM BROMIDE 20 MG: 10 INJECTION, SOLUTION INTRAVENOUS at 10:49

## 2020-04-06 RX ADMIN — FENTANYL CITRATE 50 MCG: 50 INJECTION, SOLUTION INTRAMUSCULAR; INTRAVENOUS at 10:51

## 2020-04-06 RX ADMIN — HYDROMORPHONE HYDROCHLORIDE 0.5 MG: 2 INJECTION, SOLUTION INTRAMUSCULAR; INTRAVENOUS; SUBCUTANEOUS at 12:39

## 2020-04-06 RX ADMIN — ONDANSETRON 4 MG: 2 INJECTION INTRAMUSCULAR; INTRAVENOUS at 12:09

## 2020-04-06 RX ADMIN — Medication 10 MG: at 11:11

## 2020-04-06 RX ADMIN — ROCURONIUM BROMIDE 5 MG: 10 INJECTION, SOLUTION INTRAVENOUS at 10:42

## 2020-04-06 RX ADMIN — SODIUM CHLORIDE, SODIUM LACTATE, POTASSIUM CHLORIDE, CALCIUM CHLORIDE: 600; 310; 30; 20 INJECTION, SOLUTION INTRAVENOUS at 10:34

## 2020-04-06 NOTE — OP NOTES
[facility]  OPERATIVE REPORT    Name:  Lizbet Barnes  MR#:   [id number]  :  1950  ACCOUNT #:  [de-identified]  DATE OF SERVICE:  2020    PREOPERATIVE DIAGNOSIS:  Cervical spondylotic myelopathy. POSTOPERATIVE DIAGNOSIS:  Cervical spondylotic myelopathy. PROCEDURE PERFORMED:  Anterior cervical decompression and fusion C3-4, structural allograft x1, anterior cervical plate fixation C3, C4 with K2M cervical locking plate and screws. SURGEON:  Samuel Nicole MD    ASSISTANT:  None. ANESTHESIA:  General endotracheal.    COMPLICATIONS:  No complications. SPECIMENS REMOVED:  No specimens. IMPLANTS:  K2M anterior cervical locking plate and screws and structural allograft. ESTIMATED BLOOD LOSS:  5 mL. FINDINGS:  We were able to resolve the listhesis and restore disc space height. OPERATION:  Following induction of endotracheal anesthesia, the patient was placed with head in neutral position on Da Silva headrest, prepped and draped in usual fashion. Right-sided approach was utilized. Sternocleidomastoid and great vessels mobilized laterally. Esophagus and trachea mobilized medially with blunt finger dissection. The prevertebral fascia was entered and C-arm image verified our surgical level. [place] pins were placed on bodies of C3 and C4. Cloward self-retaining retractor blades were placed under the cover of longus colli musculature bilaterally. Annular tissue was incised and a radical discectomy done back to the posterior margin. Posterior marginal osteophytes were thinned with a high-speed no and resected with a 4.0 Kia curette and sella punch. Uncinates were resected. Thorough decompression done of the epidural space. Endplates prepared. An #8 structural allograft tamped firmly into place with excellent bony apposition and resolution of the listhesis.   An anterior cervical locking plate was then utilized, K2M type, two screws in C3, two in C4 with the locking device engaged. The wound was copiously irrigated. There was no apparent bleeding. Vancomycin powder instilled for infection prophylaxis. A deep drain utilized per routine. Neck closed in layers and the skin closed with a subcuticular suture and Dermabond. A sterile occlusive dressing was placed upon the wound. All counts were correct.       MD MO [email or web address]  D:  04/06/2020 11:39  T:  04/06/2020 15:18  JOB #:  [id number]

## 2020-04-06 NOTE — DISCHARGE INSTRUCTIONS
DISCHARGE SUMMARY from Nurse    PATIENT INSTRUCTIONS:    After general anesthesia or intravenous sedation, for 24 hours or while taking prescription Narcotics:  · Limit your activities  · Do not drive and operate hazardous machinery  · Do not make important personal or business decisions  · Do  not drink alcoholic beverages  · If you have not urinated within 8 hours after discharge, please contact your surgeon on call. Report the following to your surgeon:  · Excessive pain, swelling, redness or odor of or around the surgical area  · Temperature over 100.5  · Nausea and vomiting lasting longer than 4 hours or if unable to take medications  · Any signs of decreased circulation or nerve impairment to extremity: change in color, persistent  numbness, tingling, coldness or increase pain  · Any questions    *  Please give a list of your current medications to your Primary Care Provider. *  Please update this list whenever your medications are discontinued, doses are      changed, or new medications (including over-the-counter products) are added. *  Please carry medication information at all times in case of emergency situations. These are general instructions for a healthy lifestyle:    No smoking/ No tobacco products/ Avoid exposure to second hand smoke  Surgeon General's Warning:  Quitting smoking now greatly reduces serious risk to your health. Obesity, smoking, and sedentary lifestyle greatly increases your risk for illness    A healthy diet, regular physical exercise & weight monitoring are important for maintaining a healthy lifestyle    You may be retaining fluid if you have a history of heart failure or if you experience any of the following symptoms:  Weight gain of 3 pounds or more overnight or 5 pounds in a week, increased swelling in our hands or feet or shortness of breath while lying flat in bed.   Please call your doctor as soon as you notice any of these symptoms; do not wait until your next office visit. The discharge information has been reviewed with the patient and friend. The patient and friend verbalized understanding. Discharge medications reviewed with the patient and friend and appropriate educational materials and side effects teaching were provided.   ___________________________________________________________________________________________________________________________________

## 2020-04-06 NOTE — ANESTHESIA PREPROCEDURE EVALUATION
Relevant Problems   No relevant active problems       Anesthetic History   No history of anesthetic complications            Review of Systems / Medical History  Patient summary reviewed and pertinent labs reviewed    Pulmonary  Within defined limits                 Neuro/Psych   Within defined limits           Cardiovascular    Hypertension: well controlled              Exercise tolerance: >4 METS     GI/Hepatic/Renal  Within defined limits              Endo/Other        Arthritis     Other Findings              Physical Exam    Airway  Mallampati: II  TM Distance: 4 - 6 cm  Neck ROM: normal range of motion   Mouth opening: Normal     Cardiovascular  Regular rate and rhythm,  S1 and S2 normal,  no murmur, click, rub, or gallop             Dental  No notable dental hx       Pulmonary  Breath sounds clear to auscultation               Abdominal  GI exam deferred       Other Findings            Anesthetic Plan    ASA: 3  Anesthesia type: general          Induction: Intravenous  Anesthetic plan and risks discussed with: Patient

## 2020-04-06 NOTE — INTERVAL H&P NOTE
Update History & Physical    The Patient's History and Physical of April 6, 2020 was reviewed with the patient and I examined the patient. There was no change. The surgical site was confirmed by the patient and me. Plan:  The risk, benefits, expected outcome, and alternative to the recommended procedure have been discussed with the patient. Patient understands and wants to proceed with the procedure.     Electronically signed by Mahesh Borges MD on 4/6/2020 at 9:56 AM

## 2020-04-06 NOTE — BRIEF OP NOTE
Brief Postoperative Note    Patient: Alcon Patel  YOB: 1950  MRN: [id number]    Date of Procedure: 4/6/2020     Pre-Op Diagnosis: Cervical spondylosis with myelopathy [M47.12]    Post-Op Diagnosis: Same as preoperative diagnosis. Procedure(s):  ANTERIOR CERVICAL DISCECTOMY WITH FUSION C3/4; C-ARM/K2M    Surgeon(s):  Jennifer Beckwith MD    Surgical Assistant: None    Anesthesia: General     Estimated Blood Loss (mL): Minimal    Complications: None    Specimens: * No specimens in log *     Implants:   Implant Name Type Inv.  Item Serial No.  Lot No. LRB No. Used Action   SPACER BNE CERV [id number] -- Rigo Heater W/PLUG - [id number]  SPACER BNE CERV [id number] -- Rigo Heater W/PLUG [id number] YVES SPINE HOW  N/A 1 Implanted          1 Implanted       Drains:   Scott-Lawler Drain 04/06/20 Neck (Active)       Findings: stenosis, instability    Electronically Signed by Rainelle Kanner, MD on 4/6/2020 at 11:24 AM

## 2020-04-07 ENCOUNTER — OFFICE VISIT (OUTPATIENT)
Dept: ORTHOPEDIC SURGERY | Age: 70
End: 2020-04-07

## 2020-04-07 VITALS
SYSTOLIC BLOOD PRESSURE: 150 MMHG | DIASTOLIC BLOOD PRESSURE: 81 MMHG | BODY MASS INDEX: 26.37 KG/M2 | HEIGHT: 68 IN | TEMPERATURE: 98.5 F | RESPIRATION RATE: 20 BRPM | OXYGEN SATURATION: 99 % | WEIGHT: 174 LBS | HEART RATE: 79 BPM

## 2020-04-07 DIAGNOSIS — Z98.1 S/P CERVICAL SPINAL FUSION: Primary | ICD-10-CM

## 2020-04-07 DIAGNOSIS — G95.9 CERVICAL MYELOPATHY (HCC): ICD-10-CM

## 2020-04-07 NOTE — PROGRESS NOTES
[address], [address]  [place], [address] [place] [address] Street  Phone: [phone]  Fax: [phone]  PROGRESS NOTE  Patient: Vince Dean                MRN: [id number]       SSN: [id number]  YOB: 1950        AGE: 65 y.o. SEX: male  Body mass index is 26.46 kg/m². PCP: Apple Graff MD  04/07/20    Chief Complaint   Patient presents with    Neck Pain       HISTORY OF PRESENT ILLNESS, RADIOGRAPHS, and PLAN:     Kathleen Alejo returns today. He had a cervical decompression fusion yesterday. He is thrilled he already feels a significant improvement in his balance and dexterity. His wound is healed and dry he had scant out of his drain and it was removed without issue we will see him back in 2 weeks for a virtual visit    This dictation was created utilizing voice recognition software. Errors may be present. Past Medical History:   Diagnosis Date    Benign prostatic hyperplasia, presence of lower urinary tract symptoms unspecified     BPH (benign prostatic hyperplasia)     HLD (hyperlipidemia)     Hypercholesterolemia     Hypertension     Ill-defined condition     Spine   ( back) problem. ..goes for Physical Therapy    Macular degeneration     NAION (non-arteritic anterior ischemic optic neuropathy)     Post-void dribbling     Pseudophakia        Family History   Problem Relation Age of Onset    Cancer Mother     Heart Disease Father        Current Outpatient Medications   Medication Sig Dispense Refill    oxyCODONE IR (Roxicodone) 5 mg immediate release tablet Take 1 Tab by mouth every six (6) hours as needed for Pain for up to 7 days. Max Daily Amount: 20 mg. 28 Tab 0    Promiseb crea as needed.  aspirin delayed-release 81 mg tablet Take 81 mg by mouth daily. Indications: takes daily after dinner      psyllium (METAMUCIL) powd Take  by mouth two (2) times a day.       gabapentin (NEURONTIN) 300 mg capsule Take 300 mg by mouth two (2) times a day.  krill-om-3-dha-epa-phospho-ast (KRILL OIL) 1,272-052-60-80 mg cap Take  by mouth.  tamsulosin (FLOMAX) 0.4 mg capsule Take 0.4 mg by mouth daily (after dinner).  cyanocobalamin 1,000 mcg tablet Take 1,000 mcg by mouth daily.  brimonidine (ALPHAGAN P) 0.1 % ophthalmic solution Administer 1 Drop to both eyes two (2) times a day.  acetaminophen (TYLENOL) 325 mg tablet Take 2 Tabs by mouth every six (6) hours as needed for Pain. 60 Tab 0    CINNAMON BARK (CINNAMON PO) Take  by mouth two (2) times a day.  CALCIUM PO Take  by mouth daily.  lycopene 10 mg cap Take  by mouth.  lutein 40 mg cap Take  by mouth.  omega-3 fatty acids-vitamin e (FISH OIL) 1,000 mg cap Take 1 Cap by mouth.  coenzyme q10-vitamin e (COQ10 ) 100-100 mg-unit cap Take  by mouth.  garlic cap Take  by mouth.  rosuvastatin (CRESTOR) 10 mg tablet Take 10 mg by mouth nightly.  benazepril (LOTENSIN) 5 mg tablet Take 10 mg by mouth daily.  saw palmetto 320 mg Cap Take  by mouth.  cholecalciferol, vitamin d3, (VITAMIN D3) 1,000 unit tablet Take  by mouth daily.  MULTIVITAMIN WITH MINERALS PO Take  by mouth. No Known Allergies    Past Surgical History:   Procedure Laterality Date    ENDOSCOPY, COLON, DIAGNOSTIC      HC IMPLANT PROSTATE UROLIFT [facility] USE ONLY  3/17/2020         HX CATARACT REMOVAL Bilateral     HX HEENT Bilateral     eyelid surgery, corrective    HX PROSTATE SURGERY  3/17/2020    HX SHOULDER REPLACEMENT Right 02/2018    HX SHOULDER REPLACEMENT Left 01/07/2019    left shoulder replacement        Past Medical History:   Diagnosis Date    Benign prostatic hyperplasia, presence of lower urinary tract symptoms unspecified     BPH (benign prostatic hyperplasia)     HLD (hyperlipidemia)     Hypercholesterolemia     Hypertension     Ill-defined condition     Spine   ( back) problem. ..goes for Physical Therapy  Macular degeneration     NAION (non-arteritic anterior ischemic optic neuropathy)     Post-void dribbling     Pseudophakia        Social History     Socioeconomic History    Marital status: SINGLE     Spouse name: Not on file    Number of children: Not on file    Years of education: Not on file    Highest education level: Not on file   Occupational History    Not on file   Social Needs    Financial resource strain: Not on file    Food insecurity     Worry: Not on file     Inability: Not on file    Transportation needs     Medical: Not on file     Non-medical: Not on file   Tobacco Use    Smoking status: Never Smoker    Smokeless tobacco: Never Used   Substance and Sexual Activity    Alcohol use: Yes     Alcohol/week: 1.0 standard drinks     Types: 1 Glasses of wine per week     Comment: social    Drug use: Never    Sexual activity: Not on file   Lifestyle    Physical activity     Days per week: Not on file     Minutes per session: Not on file    Stress: Not on file   Relationships    Social connections     Talks on phone: Not on file     Gets together: Not on file     Attends Christian service: Not on file     Active member of club or organization: Not on file     Attends meetings of clubs or organizations: Not on file     Relationship status: Not on file    Intimate partner violence     Fear of current or ex partner: Not on file     Emotionally abused: Not on file     Physically abused: Not on file     Forced sexual activity: Not on file   Other Topics Concern    Not on file   Social History Narrative    Not on file         REVIEW OF SYSTEMS:   CONSTITUTIONAL SYMPTOMS:  Negative. EYES:  Negative. EARS, NOSE, THROAT AND MOUTH:  Negative. CARDIOVASCULAR:  Negative. RESPIRATORY:  Negative. GENITOURINARY: Per HPI. GASTROINTESTINAL:  Per HPI. INTEGUMENTARY (SKIN AND/OR BREAST):  Negative. MUSCULOSKELETAL: Per HPI.   ENDOCRINE/RHEUMATOLOGIC:  Negative.    NEUROLOGICAL:  Per HPI.   HEMATOLOGIC/LYMPHATIC:  Negative. ALLERGIC/IMMUNOLOGIC:  Negative. PSYCHIATRIC:  Negative. PHYSICAL EXAMINATION:   Visit Vitals  /81 (BP 1 Location: Left arm, BP Patient Position: Sitting)   Pulse 79   Temp 98.5 °F (36.9 °C) (Oral)   Resp 20   Ht 5' 8\" (1.727 m)   Wt 174 lb (78.9 kg)   SpO2 99% Comment: RA   BMI 26.46 kg/m²    PAIN SCALE: 2/10    CONSTITUTIONAL: The patient is in no apparent distress and is alert and oriented x 3. HEENT: Normocephalic. Hearing grossly intact. NECK: Supple and symmetric. no tenderness, or masses were felt. RESPIRATORY: No labored breathing. CARDIOVASCULAR: The carotid pulses were normal. Peripheral pulses were 2+. CHEST: Normal AP diameter and normal contour without any kyphoscoliosis. LYMPHATIC: No lymphadenopathy was appreciated in the neck, axillae or groin. SKIN:  Incision healing well, no drainage, no erythema, no hernia, no seroma, no swelling, no dehiscence, incision well approximated. Negative for scars, rashes, lesions, or ulcers on the right upper, right lower, left upper, left lower and trunk. NEUROLOGICAL: Alert and oriented x 3. Ambulation without assistive device. FWB. EXTREMITIES: See musculoskeletal.  MUSCULOSKELETAL:   Head and Neck: Negative for misalignment, asymmetry, crepitation, defects, tenderness masses or effusions.  Left Upper Extremity: Inspection, percussion and palpation performed. Gurrolas sign is negative.  Right Upper Extremity: Inspection, percussion and palpation performed. Gurrolas sign is negative.  Spine, Ribs and Pelvis: Inspection, percussion and palpation performed. Negative for misalignment, asymmetry, crepitation, defects, tenderness masses or effusions.  Left Lower Extremity: Inspection, percussion and palpation performed. Negative straight leg raise.  Right Lower Extremity: Inspection, percussion and palpation performed. Negative straight leg raise.         SPINE EXAM:     Cervical spine: Neck is midline. Normal muscle tone. No focal atrophy is noted. ASSESSMENT    ICD-10-CM ICD-9-CM    1. S/P cervical spinal fusion Z98.1 V45.4    2. [facility] G95.9 721.1        Written by Hima Orta, as dictated by Corey Fierro MD.    I, Dr. Corey Fierro MD, confirm that all documentation is accurate.

## 2020-04-07 NOTE — PROGRESS NOTES
Sugey Balderrama presents today for   Chief Complaint   Patient presents with    Neck Pain       Is someone accompanying this pt? no    Is the patient using any DME equipment during OV? no    Depression Screening:  3 most recent PHQ Screens 3/31/2020   PHQ Not Done -   Little interest or pleasure in doing things Not at all   Feeling down, depressed, irritable, or hopeless Not at all   Total Score PHQ 2 0       Learning Assessment:  Learning Assessment 2/8/2019   PRIMARY LEARNER Patient   PRIMARY LANGUAGE ENGLISH   LEARNER PREFERENCE PRIMARY READING   ANSWERED BY Patient   RELATIONSHIP SELF       Abuse Screening:  No flowsheet data found. Fall Risk  Fall Risk Assessment, last 12 mths 3/31/2020   Able to walk? Yes   Fall in past 12 months? -   Fall with injury? -   Number of falls in past 12 months -   Fall Risk Score -       Coordination of Care:  1. Have you been to the ER, urgent care clinic since your last visit? no  Hospitalized since your last visit? no    2. Have you seen or consulted any other health care providers outside of the [address] [place] [address] since your last visit? no Include any pap smears or colon screening.  none

## 2020-04-07 NOTE — LETTER
4/7/20 Patient: Rigo Mccartney YOB: 1950 Date of Visit: 4/7/2020 Daniele Hsieh MD 
[address] [address] [address] VIA Facsimile: [phone] Dear Daniele Hsieh MD, Thank you for referring Mr. Elma Zaragoza to [address] [place] [address] for evaluation. My notes for this consultation are attached. If you have questions, please do not hesitate to call me. I look forward to following your patient along with you.  
 
 
Sincerely, 
 
Angelica Conroy MD

## 2020-04-09 ENCOUNTER — HOME HEALTH ADMISSION (OUTPATIENT)
Dept: HOME HEALTH SERVICES | Facility: HOME HEALTH | Age: 70
End: 2020-04-09
Payer: MEDICARE

## 2020-04-09 ENCOUNTER — TELEPHONE (OUTPATIENT)
Dept: ORTHOPEDIC SURGERY | Age: 70
End: 2020-04-09

## 2020-04-09 DIAGNOSIS — Z98.1 S/P CERVICAL SPINAL FUSION: Primary | ICD-10-CM

## 2020-04-09 NOTE — TELEPHONE ENCOUNTER
Please call patient. Is HH coming out? The dressing can be removed and he can place and gauze and paper tape dressing on. Will need to be changed daily or sooner if wet. Leave glue in place under the honeycomb dressing.

## 2020-04-09 NOTE — TELEPHONE ENCOUNTER
Spoke with patient, informed of NP Amber message below. Patient stated understanding, [facility] will be going to his home on 4/10/20. No further actions needed at this time.

## 2020-04-09 NOTE — TELEPHONE ENCOUNTER
Patient called to report that his bandage is coming off surgical site on both sides, patient states he did not get bandage wet. He tried to re-apply it and \"it won't stick\". Please contact patient to advise at [phone].

## 2020-04-09 NOTE — TELEPHONE ENCOUNTER
Spoke with patient, informed of KARLEE Clark message below. Patient stated he does not have anyone to go out for him, but he can drive himself. He does have bandaids at home.  Please advise

## 2020-04-09 NOTE — TELEPHONE ENCOUNTER
Order placed, hopefully they can come today or tomorrow. if the band aides are big enough to cover, use those. I do not want him going out unless absolutely necessary.

## 2020-04-09 NOTE — TELEPHONE ENCOUNTER
Spoke with patient, informed of the Provider message below. Patient stated understanding, but now is asking when can he drive? Can he go outside and walk his neighborhood? Please advise.

## 2020-04-09 NOTE — TELEPHONE ENCOUNTER
Wait to drive for the 2 week visit. He can go outside and walk. Start slow and don't go to far. Increase distance each day.

## 2020-04-10 ENCOUNTER — HOME CARE VISIT (OUTPATIENT)
Dept: SCHEDULING | Facility: HOME HEALTH | Age: 70
End: 2020-04-10
Payer: MEDICARE

## 2020-04-10 ENCOUNTER — HOME CARE VISIT (OUTPATIENT)
Dept: HOME HEALTH SERVICES | Facility: HOME HEALTH | Age: 70
End: 2020-04-10

## 2020-04-10 VITALS
OXYGEN SATURATION: 98 % | SYSTOLIC BLOOD PRESSURE: 152 MMHG | DIASTOLIC BLOOD PRESSURE: 88 MMHG | TEMPERATURE: 98 F | RESPIRATION RATE: 16 BRPM | HEART RATE: 89 BPM

## 2020-04-10 VITALS
RESPIRATION RATE: 16 BRPM | OXYGEN SATURATION: 98 % | HEART RATE: 78 BPM | SYSTOLIC BLOOD PRESSURE: 110 MMHG | TEMPERATURE: 97.5 F | DIASTOLIC BLOOD PRESSURE: 80 MMHG

## 2020-04-10 PROCEDURE — 400013 HH SOC

## 2020-04-10 PROCEDURE — 3331090001 HH PPS REVENUE CREDIT

## 2020-04-10 PROCEDURE — G0299 HHS/HOSPICE OF RN EA 15 MIN: HCPCS

## 2020-04-10 PROCEDURE — 3331090002 HH PPS REVENUE DEBIT

## 2020-04-10 PROCEDURE — G0151 HHCP-SERV OF PT,EA 15 MIN: HCPCS

## 2020-04-11 PROCEDURE — 3331090002 HH PPS REVENUE DEBIT

## 2020-04-11 PROCEDURE — 3331090001 HH PPS REVENUE CREDIT

## 2020-04-12 PROCEDURE — 3331090002 HH PPS REVENUE DEBIT

## 2020-04-12 PROCEDURE — 3331090001 HH PPS REVENUE CREDIT

## 2020-04-13 ENCOUNTER — HOME CARE VISIT (OUTPATIENT)
Dept: SCHEDULING | Facility: HOME HEALTH | Age: 70
End: 2020-04-13
Payer: MEDICARE

## 2020-04-13 PROCEDURE — 3331090002 HH PPS REVENUE DEBIT

## 2020-04-13 PROCEDURE — G0299 HHS/HOSPICE OF RN EA 15 MIN: HCPCS

## 2020-04-13 PROCEDURE — G0157 HHC PT ASSISTANT EA 15: HCPCS

## 2020-04-13 PROCEDURE — 3331090001 HH PPS REVENUE CREDIT

## 2020-04-14 ENCOUNTER — HOME CARE VISIT (OUTPATIENT)
Dept: HOME HEALTH SERVICES | Facility: HOME HEALTH | Age: 70
End: 2020-04-14
Payer: MEDICARE

## 2020-04-14 VITALS
RESPIRATION RATE: 20 BRPM | SYSTOLIC BLOOD PRESSURE: 122 MMHG | TEMPERATURE: 97.8 F | DIASTOLIC BLOOD PRESSURE: 80 MMHG | HEART RATE: 87 BPM | OXYGEN SATURATION: 99 %

## 2020-04-14 PROCEDURE — 3331090001 HH PPS REVENUE CREDIT

## 2020-04-14 PROCEDURE — 3331090002 HH PPS REVENUE DEBIT

## 2020-04-15 ENCOUNTER — HOME CARE VISIT (OUTPATIENT)
Dept: SCHEDULING | Facility: HOME HEALTH | Age: 70
End: 2020-04-15
Payer: MEDICARE

## 2020-04-15 VITALS
HEART RATE: 98 BPM | DIASTOLIC BLOOD PRESSURE: 80 MMHG | TEMPERATURE: 75 F | OXYGEN SATURATION: 98 % | SYSTOLIC BLOOD PRESSURE: 120 MMHG

## 2020-04-15 VITALS
SYSTOLIC BLOOD PRESSURE: 122 MMHG | HEART RATE: 87 BPM | TEMPERATURE: 97.8 F | OXYGEN SATURATION: 99 % | RESPIRATION RATE: 20 BRPM | DIASTOLIC BLOOD PRESSURE: 80 MMHG

## 2020-04-15 PROCEDURE — 3331090002 HH PPS REVENUE DEBIT

## 2020-04-15 PROCEDURE — G0157 HHC PT ASSISTANT EA 15: HCPCS

## 2020-04-15 PROCEDURE — 3331090001 HH PPS REVENUE CREDIT

## 2020-04-16 ENCOUNTER — HOME CARE VISIT (OUTPATIENT)
Dept: SCHEDULING | Facility: HOME HEALTH | Age: 70
End: 2020-04-16
Payer: MEDICARE

## 2020-04-16 PROCEDURE — 3331090001 HH PPS REVENUE CREDIT

## 2020-04-16 PROCEDURE — G0300 HHS/HOSPICE OF LPN EA 15 MIN: HCPCS

## 2020-04-16 PROCEDURE — 3331090002 HH PPS REVENUE DEBIT

## 2020-04-17 ENCOUNTER — HOME CARE VISIT (OUTPATIENT)
Dept: SCHEDULING | Facility: HOME HEALTH | Age: 70
End: 2020-04-17
Payer: MEDICARE

## 2020-04-17 VITALS
SYSTOLIC BLOOD PRESSURE: 130 MMHG | DIASTOLIC BLOOD PRESSURE: 95 MMHG | OXYGEN SATURATION: 97 % | TEMPERATURE: 98.4 F | RESPIRATION RATE: 18 BRPM | HEART RATE: 75 BPM

## 2020-04-17 PROCEDURE — 3331090001 HH PPS REVENUE CREDIT

## 2020-04-17 PROCEDURE — 3331090002 HH PPS REVENUE DEBIT

## 2020-04-17 PROCEDURE — G0157 HHC PT ASSISTANT EA 15: HCPCS

## 2020-04-18 PROCEDURE — 3331090002 HH PPS REVENUE DEBIT

## 2020-04-18 PROCEDURE — 3331090001 HH PPS REVENUE CREDIT

## 2020-04-19 PROCEDURE — 3331090001 HH PPS REVENUE CREDIT

## 2020-04-19 PROCEDURE — 3331090002 HH PPS REVENUE DEBIT

## 2020-04-20 ENCOUNTER — HOME CARE VISIT (OUTPATIENT)
Dept: SCHEDULING | Facility: HOME HEALTH | Age: 70
End: 2020-04-20
Payer: MEDICARE

## 2020-04-20 VITALS
DIASTOLIC BLOOD PRESSURE: 70 MMHG | SYSTOLIC BLOOD PRESSURE: 110 MMHG | OXYGEN SATURATION: 98 % | TEMPERATURE: 98.2 F | HEART RATE: 72 BPM

## 2020-04-20 PROCEDURE — G0157 HHC PT ASSISTANT EA 15: HCPCS

## 2020-04-20 PROCEDURE — 3331090001 HH PPS REVENUE CREDIT

## 2020-04-20 PROCEDURE — G0299 HHS/HOSPICE OF RN EA 15 MIN: HCPCS

## 2020-04-20 PROCEDURE — 3331090002 HH PPS REVENUE DEBIT

## 2020-04-21 PROCEDURE — 3331090001 HH PPS REVENUE CREDIT

## 2020-04-21 PROCEDURE — 3331090002 HH PPS REVENUE DEBIT

## 2020-04-22 ENCOUNTER — HOME CARE VISIT (OUTPATIENT)
Dept: SCHEDULING | Facility: HOME HEALTH | Age: 70
End: 2020-04-22
Payer: MEDICARE

## 2020-04-22 ENCOUNTER — VIRTUAL VISIT (OUTPATIENT)
Dept: ORTHOPEDIC SURGERY | Age: 70
End: 2020-04-22

## 2020-04-22 ENCOUNTER — DOCUMENTATION ONLY (OUTPATIENT)
Dept: ORTHOPEDIC SURGERY | Age: 70
End: 2020-04-22

## 2020-04-22 VITALS
OXYGEN SATURATION: 97 % | SYSTOLIC BLOOD PRESSURE: 120 MMHG | HEART RATE: 74 BPM | TEMPERATURE: 97.5 F | DIASTOLIC BLOOD PRESSURE: 70 MMHG

## 2020-04-22 VITALS
HEART RATE: 79 BPM | DIASTOLIC BLOOD PRESSURE: 78 MMHG | TEMPERATURE: 97 F | SYSTOLIC BLOOD PRESSURE: 136 MMHG | OXYGEN SATURATION: 100 %

## 2020-04-22 DIAGNOSIS — Z98.1 S/P CERVICAL SPINAL FUSION: Primary | ICD-10-CM

## 2020-04-22 PROCEDURE — G0157 HHC PT ASSISTANT EA 15: HCPCS

## 2020-04-22 PROCEDURE — 3331090002 HH PPS REVENUE DEBIT

## 2020-04-22 PROCEDURE — 3331090001 HH PPS REVENUE CREDIT

## 2020-04-22 NOTE — PATIENT INSTRUCTIONS
Cervical Spinal Fusion: What to Expect at [facility] Your Recovery After surgery, you can expect your neck to feel stiff and sore. This should improve in the weeks after surgery. You may have trouble sitting or standing in one position for very long and may need pain medicine in the weeks after your surgery. You may need to wear a neck brace for a while. It may take 4 to 6 weeks to get back to your usual activities, but it may depend on what kind of surgery you had. Your doctor may advise you to work with a physical therapist to strengthen the muscles around your neck and back. This care sheet gives you a general idea about how long it will take for you to recover. But each person recovers at a different pace. Follow the steps below to get better as quickly as possible. How can you care for yourself at home? Activity 
  · Rest when you feel tired. Getting enough sleep will help you recover.  
  · Try to walk each day. Start by walking a little more than you did the day before. Bit by bit, increase the amount you walk. Walking boosts blood flow and helps prevent pneumonia and constipation. Walking may also decrease your muscle soreness after surgery.  
  · Follow your doctor's directions about not lifting anything that would strain your neck and back. This may include a child, heavy grocery bags and milk containers, a heavy briefcase or backpack, cat litter or dog food bags, or a vacuum .  
  · Avoid strenuous activities, such as bicycle riding, jogging, weightlifting, or aerobic exercise, until your doctor says it is okay.  
  · Do not drive for 2 to 4 weeks after your surgery or until your doctor says it is okay.  
  · Avoid taking long car trips for 2 to 4 weeks after surgery. Your neck may become tired and painful from sitting too long in one position.  
  · You will probably need to take 4 to 6 weeks off from work. It depends on the type of work you do and how you feel.   · You may have sex as soon as you feel able, but avoid positions that put stress on your neck or cause pain. Diet 
  · You can eat your normal diet. If your stomach is upset, try bland, low-fat foods like plain rice, broiled chicken, toast, and yogurt.  
  · Drink plenty of fluids. If you have kidney, heart, or liver disease and have to limit fluids, talk with your doctor before you increase the amount of fluids you drink.  
  · You may notice that your bowel movements are not regular right after your surgery. This is common. Try to avoid constipation and straining with bowel movements. You may want to take a fiber supplement every day. If you have not had a bowel movement after a couple of days, ask your doctor about taking a mild laxative. Medicines 
  · Your doctor will tell you if and when you can restart your medicines. He or she will also give you instructions about taking any new medicines.  
  · If you take aspirin or some other blood thinner, ask your doctor if and when to start taking it again. Make sure that you understand exactly what your doctor wants you to do.  
  · Be safe with medicines. Take pain medicines exactly as directed. ? If the doctor gave you a prescription medicine for pain, take it as prescribed. ? If you are not taking a prescription pain medicine, ask your doctor if you can take an over-the-counter medicine.  
  · If your doctor prescribed antibiotics, take them as directed. Do not stop taking them just because you feel better. You need to take the full course of antibiotics.  
  · If you think your pain pill is making you sick to your stomach: 
? Take your pills after meals (unless your doctor has told you not to). ? Ask your doctor for a different pain pill. Incision care 
  · You will be given specific instructions about how to care for the cut (incision) the doctor made. The instructions will depend on the type of materials used to close the cut. Exercise   · Do exercises as instructed by your doctor or physical therapist to improve your strength and flexibility. Other instructions 
  · To reduce stiffness and help sore muscles, use a warm water bottle, a heating pad set on low, or a warm cloth on your neck. Do not put heat right over the incision. Do not go to sleep with a heating pad on your skin. Follow-up care is a key part of your treatment and safety. Be sure to make and go to all appointments, and call your doctor if you are having problems. It's also a good idea to know your test results and keep a list of the medicines you take. When should you call for help? Call 911 anytime you think you may need emergency care. For example, call if: 
  · You passed out (lost consciousness).  
  · You have chest pain, are short of breath, or cough up blood.  
  · You are unable to move an arm or a leg at all.  
[facility] your doctor now or seek immediate medical care if: 
  · You have pain that does not get better after you take pain medicine.  
  · You have loose stitches, or your incision comes open.  
  · Bright red blood has soaked through the bandage over your incision.  
  · You have signs of a blood clot in your leg (called a deep vein thrombosis), such as: 
? Pain in your calf, back of the knee, thigh, or groin. ? Redness or swelling in your leg.  
  · You have signs of infection, such as: 
? Increased pain, swelling, warmth, or redness. ? Red streaks leading from the incision. ? Pus draining from the incision. ? A fever.  
  · You have new or worse symptoms in your arms, legs, chest, belly, or buttocks. Symptoms may include: 
? Numbness or tingling. ? Weakness. ? Pain.  
  · You lose bladder or bowel control.  
 Watch closely for any changes in your health, and be sure to contact your doctor if: 
  · You do not get better as expected. Where can you learn more? Go to http://reji-sigifredo.info/ Enter N969 in the search box to learn more about \"Cervical Spinal Fusion: What to Expect at Home. \" Current as of: June 26, 2019Content Version: 12.4 © 0490-5275 [employer]. Care instructions adapted under license by [facility] (which disclaims liability or warranty for this information). If you have questions about a medical condition or this instruction, always ask your healthcare professional. [place] [address] any warranty or liability for your use of this information.

## 2020-04-22 NOTE — PROGRESS NOTES
Vince Dean is a 65 y.o. male who was seen by synchronous (real-time) audio-video technology on 4/22/2020. He has a history of neck pain. He had an ACDF C3/4 2 weeks ago. Prior to surgery, he had progressive symptoms such as poor balance and gait. Today, he states he can tell a big difference in his balance. He is feeling good from surgery. He has some intermittent right sided neck pain. He never took any pain medication. He is taking tylenol. Denies bladder/bowel dysfunction, saddle paresthesia, weakness, gait disturbance, or other neurological deficit. Pt at this time desires to  continue with current care/proceed with medication evaluation/proceed with post op care. ASSESSMENT  65 y.o. male with neck pain, s/p ACDF. Diagnoses and all orders for this visit:    1. S/P cervical spinal fusion           IMPRESSION/PLAN    1) Pt was given information on s/p cervical fusion. 2) cont tylenol PRN  3) limited BLT, wound care reviewed. Patient removed bandage, no signs of drainage per patient. 4) Mr. Gus Lesch has a reminder for a \"due or due soon\" health maintenance. I have asked that he contact his primary care provider, Apple Graff MD, for follow-up on this health maintenance. 5) We have informed patient to notify us for immediate appointment if he has any worsening neurogical symptoms or if an emergency situation presents, then call 911  5) Pt will follow-up in 4 weeks as scheduled. 6) per Dr. Jack Blanco last note before surgery, With regards to his left-sided flank and leg pain it is hard to tell whether this is due to thoracic myelo radiculopathy on the left due to his thoracic stenosis due to lumbar spinal stenosis or combination of the 2. I would treat this secondarily the patient's symptoms remained with low thoracic laminectomy and probably a unilateral multilevel lumbar decompression. Risks and benefits of ongoing therapy have been reviewed with the patient.  is appropriate.  No pain behaviors. Denies thoughts of harming self or others. Pt has a good risk to benefit ratio which allows the pt to function in a home environment without side effects. Assessment & Plan:   Diagnoses and all orders for this visit:    1. S/P cervical spinal fusion                  Subjective:   William Mcdonald was seen for Surgical Follow-up        PAST MEDICAL HISTORY  Past Medical History:   Diagnosis Date    Benign prostatic hyperplasia, presence of lower urinary tract symptoms unspecified     BPH (benign prostatic hyperplasia)     HLD (hyperlipidemia)     Hypercholesterolemia     Hypertension     Ill-defined condition     Spine   ( back) problem. ..goes for Physical Therapy    Macular degeneration     NAION (non-arteritic anterior ischemic optic neuropathy)     Post-void dribbling     Pseudophakia         MEDICATIONS  Current Outpatient Medications   Medication Sig Dispense Refill    bisacodyL (DULCOLAX) 5 mg EC tablet Take 5 mg by mouth daily as needed for Constipation.  polyethylene glycol (MIRALAX) 17 gram/dose powder Take 17 g by mouth daily.  benazepriL (LOTENSIN) 10 mg tablet Take 10 mg by mouth daily.  saw pal-pumpkin [id number] 320-40-10-15 mg cap Take 1 Tab by mouth daily.  glucosamine HCl/S-Adenosylmet (TRIPLE FLEX MOOD & JOINT MIC-E PO) Take 1 Tab by mouth daily.  co-enzyme Q-10 (Co Q-10) 100 mg capsule Take 100 mg by mouth daily.  garlic (Garlique) 4,936 mcg tab Take 1 Tab by mouth daily.  acetaminophen (Tylenol Arthritis Pain) 650 mg TbER Take 650 mg by mouth every eight (8) hours as needed for Pain.  diphenhydrAMINE-acetaminophen (Tylenol PM Extra Strength)  mg tab Take 2 Tabs by mouth nightly as needed for Pain.  psyllium (METAMUCIL) powd Take 2 Scoops by mouth two (2) times a day.  gabapentin (NEURONTIN) 300 mg capsule Take 300 mg by mouth two (2) times a day.       tamsulosin (FLOMAX) 0.4 mg capsule Take 0.4 mg by mouth daily (after dinner).  cyanocobalamin 1,000 mcg tablet Take 1,000 mcg by mouth daily.  brimonidine (ALPHAGAN P) 0.1 % ophthalmic solution Administer 1 Drop to both eyes two (2) times a day.  CINNAMON BARK (CINNAMON PO) Take 1 Tab by mouth two (2) times a day.  CALCIUM PO Take 1 Tab by mouth daily.  lycopene 10 mg cap Take 1 Cap by mouth daily.  rosuvastatin (CRESTOR) 10 mg tablet Take 10 mg by mouth nightly.  cholecalciferol, vitamin d3, (VITAMIN D3) 1,000 unit tablet Take 1,000 Units by mouth daily.  MULTIVITAMIN WITH MINERALS PO Take 1 Tab by mouth daily.  predniSONE (STERAPRED DS) 10 mg dose pack take as directed      docusate sodium (COLACE) 100 mg capsule Take 100 mg by mouth three (3) times daily as needed for Constipation.  C,E,zinc,copper 11-[id number]-lut (Ocuvite Adult 50 Plus) 250-5-1 mg cap Take 1 Cap by mouth daily.  acetaminophen (TYLENOL) 500 mg tablet Take 1,000 mg by mouth every six (6) hours as needed for Pain.  aspirin delayed-release 81 mg tablet Take 81 mg by mouth daily. Indications: takes daily after dinner      acetaminophen (TYLENOL) 325 mg tablet Take 2 Tabs by mouth every six (6) hours as needed for Pain. 60 Tab 0    lutein 40 mg cap Take 1 Tab by mouth daily. ALLERGIES  No Known Allergies    SOCIAL HISTORY    Social History     Socioeconomic History    Marital status: SINGLE     Spouse name: Not on file    Number of children: Not on file    Years of education: Not on file    Highest education level: Not on file   Occupational History    Not on file   Social Needs    Financial resource strain: Not on file    Food insecurity     Worry: Not on file     Inability: Not on file    Transportation needs     Medical: Not on file     Non-medical: Not on file   Tobacco Use    Smoking status: Never Smoker    Smokeless tobacco: Never Used   Substance and Sexual Activity    Alcohol use:  Yes     Alcohol/week: 1.0 standard drinks     Types: 1 Glasses of wine per week     Comment: social    Drug use: Never    Sexual activity: Not on file   Lifestyle    Physical activity     Days per week: Not on file     Minutes per session: Not on file    Stress: Not on file   Relationships    Social connections     Talks on phone: Not on file     Gets together: Not on file     Attends Shinto service: Not on file     Active member of club or organization: Not on file     Attends meetings of clubs or organizations: Not on file     Relationship status: Not on file    Intimate partner violence     Fear of current or ex partner: Not on file     Emotionally abused: Not on file     Physically abused: Not on file     Forced sexual activity: Not on file   Other Topics Concern    Not on file   Social History Narrative    Not on file       Pain Scale: /10    Pain Assessment  4/7/2020   Location of Pain Neck   Location Modifiers Medial   Severity of Pain 2   Quality of Pain Aching   Quality of Pain Comment -   Duration of Pain Persistent   Duration of Pain Comment -   Frequency of Pain Intermittent   Aggravating Factors Other (Comment)   Aggravating Factors Comment -   Limiting Behavior Yes   Relieving Factors Other (Comment)   Relieving Factors Comment looking straight   Result of Injury No   Work-Related Injury -   Type of Injury -         ROS      Objective:       General: alert, cooperative, no distress, appears stated age  Constitutional:  in no acute distress. Psychiatric: Affect and mood are appropriate. Due to this being a TeleHealth evaluation, many elements of the physical examination are unable to be assessed. We discussed the expected course, resolution and complications of the diagnosis(es) in detail. Medication risks, benefits, costs, interactions, and alternatives were discussed as indicated. I advised him to contact the office if his condition worsens, changes or fails to improve as anticipated.  He expressed understanding with the diagnosis(es) and plan. Pursuant to the emergency declaration under the [facility], [facility] waiver authority and the [employer] Act, this Virtual  Visit was conducted, with patient's consent, to reduce the patient's risk of exposure to COVID-19 and provide continuity of care for an established patient. Services were provided through real time synchronous discussion virtually to substitute for in-person clinic visit. Patient verbally consented to this type of visit and that they might get a bill from the billing of this visit. This service was provided through telephpone ,  the patient was located at home and  the provider was located at home. There was only the patient participating in the service. Start time 200  End [id number].      Abi Peguero NP

## 2020-04-22 NOTE — PROGRESS NOTES
Called patient to make his 4 wk PO appt : po, sx 4-8, *Telephone Call* [phone]. Pt has flip Phone.
Yes

## 2020-04-23 PROCEDURE — 3331090001 HH PPS REVENUE CREDIT

## 2020-04-23 PROCEDURE — 3331090002 HH PPS REVENUE DEBIT

## 2020-04-24 ENCOUNTER — HOME CARE VISIT (OUTPATIENT)
Dept: SCHEDULING | Facility: HOME HEALTH | Age: 70
End: 2020-04-24
Payer: MEDICARE

## 2020-04-24 ENCOUNTER — OFFICE VISIT (OUTPATIENT)
Dept: ORTHOPEDIC SURGERY | Age: 70
End: 2020-04-24

## 2020-04-24 VITALS
HEIGHT: 68 IN | BODY MASS INDEX: 27.01 KG/M2 | DIASTOLIC BLOOD PRESSURE: 71 MMHG | SYSTOLIC BLOOD PRESSURE: 128 MMHG | HEART RATE: 89 BPM | WEIGHT: 178.2 LBS | OXYGEN SATURATION: 93 % | TEMPERATURE: 98.2 F

## 2020-04-24 DIAGNOSIS — G95.9 CERVICAL MYELOPATHY (HCC): Primary | ICD-10-CM

## 2020-04-24 DIAGNOSIS — Z98.1 S/P CERVICAL SPINAL FUSION: ICD-10-CM

## 2020-04-24 PROCEDURE — 3331090001 HH PPS REVENUE CREDIT

## 2020-04-24 PROCEDURE — 3331090002 HH PPS REVENUE DEBIT

## 2020-04-24 PROCEDURE — G0151 HHCP-SERV OF PT,EA 15 MIN: HCPCS

## 2020-04-24 NOTE — PROGRESS NOTES
[address]  [address], [address]  [place], [address] [place] [address] Street  Phone: [phone]  Fax: [phone]  PROGRESS NOTE-Post-op  Patient: Homa Thomas                MRN: [id number]       SSN: [id number]  YOB: 1950        AGE: 65 y.o. SEX: male  Body mass index is 27.1 kg/m². PCP: Low Zavaleta MD  04/24/20    Chief Complaint   Patient presents with    Neck Pain     bandage removal       HISTORY OF PRESENT ILLNESS:  Homa Thomas is a 65 y.o. male with history of neck pain, loss of strength of the arm(s) and balance issues who had ACDF C3-4 on 4/06/20 for myelopathy. He comes in today for a wound check, he was concerned that there was a bandage still sticking to it, probably the steri-strips. Thos are off now. His incision looks good and is without any swelling, it is well approximated and has no drainage. He is doing well with improved dexterity and balance issues. He has some chronic back and LLE pain that he is wanting back surgery for, we will have him discuss that with Dr. Arias Covert when he is closer to 3mo out. He is having some R shoulder pain, which is expected and normal..  Patient denies any fevers, wound drainage, bladder/bowel dysfunction, new onset weakness, saddle paresthesia, new onset radiculopathy or nerve pain, or other neurological deficits. Reports that he is swallowing without difficulty. Pt desires to continue with evaluation of neck pain and postoperative care. Current Medications: Gabapentin 300mg BID with moderate, relief    ASSESSMENT   Diagnoses and all orders for this visit:    1. Cervical myelopathy ([facility] [address]    2. S/P cervical spinal fusion         IMPRESSION AND PLAN:  This is a pt who had a ACDF surgery 2 weeks ago. He is doing well, he came in for a wound check, it looks good. Marvin Ochoa 1) Pt was given information on neck pain post-operative and wound care.   2) Reviewed activity and to avoid NSAIDs x 3 months. 3) Discussed ROM  4) Mr. Yas Pulido has a reminder for a \"due or due soon\" health maintenance. I have asked that he contact his primary care provider, Cristobal Arzate MD, for follow-up on this health maintenance. 5) We have informed the patient to notify us for immediate appointment if he has any worsening neurogical symptoms or if an emergency situation presents, then call 911  6)  demonstrated consistency with prescribing. 7) Pt will follow-up in 4 WKS as scheduled. SUBJECTIVE    Smoking Status non smoker  Work retired    Pain Scale: 2/10    Pain Assessment  4/24/2020   Location of Pain Neck   Location Modifiers Right   Severity of Pain 2   Quality of Pain Aching   Quality of Pain Comment -   Duration of Pain Persistent   Duration of Pain Comment -   Frequency of Pain Intermittent   Aggravating Factors Walking   Aggravating Factors Comment -   Limiting Behavior No   Relieving Factors NSAID   Relieving Factors Comment -   Result of Injury No   Work-Related Injury -   Type of Injury -           REVIEW OF SYSTEMS  Constitutional: Negative for fever, chills, or weight change. Respiratory: Negative for cough or shortness of breath. Cardiovascular: Negative for chest pain or palpitations. Gastrointestinal: Negative for incontinence, acid reflux, change in bowel habits, or constipation. Genitourinary: Negative for incontinence, dysuria and flank pain. Musculoskeletal: Positive for neck pain. See HPI. Skin: Negative for rash. Neurological:no radiculopathy. See HPI. Endo/Heme/Allergies: Negative. Psychiatric/Behavioral: Negative. PHYSICAL EXAMINATION  Visit Vitals  /71 (BP 1 Location: Left arm, BP Patient Position: Sitting)   Pulse 89   Temp 98.2 °F (36.8 °C) (Oral)   Ht 5' 8\" (1.727 m)   Wt 178 lb 3.2 oz (80.8 kg)   SpO2 93%   BMI 27.10 kg/m²         Accompanied by self. Constitutional:  Well developed, well nourished, in no acute distress.    Psychiatric: Affect and mood are appropriate. Integumentary: No rashes or abrasions noted on exposed areas. Wound: anterior neck Incision healing well, no drainage, no erythema, no hernia, no seroma, no swelling, no dehiscence, incision well approximated. Cardiovascular/Peripheral Vascular: +2 radial & pedal pulses. No peripheral edema is noted. Lymphatic:  No evidence of lymphedema. No cervical lymphadenopathy. SPINE/MUSCULOSKELETAL EXAM    Cervical spine:      Neck is midline. Normal muscle tone. No focal atrophy is noted. Shoulder ROM intact. Neck ROM intact with flexion, extension, turning right, turning left. Tenderness to palpation none. Negative Gurrola's sign. Sensation grossly intact to light touch. MOTOR:        Biceps  Triceps Deltoids Wrist Ext Wrist Flex Hand Intrin   Right +4/5 +4/5 +4/5 +4/5 +4/5 3/5   Left +4/5 +4/5 +4/5 +4/5 +4/5 -4/5         normal gait and station    Ambulation without assistive device. FWB. PAST MEDICAL HISTORY   Past Medical History:   Diagnosis Date    Benign prostatic hyperplasia, presence of lower urinary tract symptoms unspecified     BPH (benign prostatic hyperplasia)     HLD (hyperlipidemia)     Hypercholesterolemia     Hypertension     Ill-defined condition     Spine   ( back) problem. ..goes for Physical Therapy    Macular degeneration     NAION (non-arteritic anterior ischemic optic neuropathy)     Post-void dribbling     Pseudophakia        Past Surgical History:   Procedure Laterality Date    ENDOSCOPY, COLON, DIAGNOSTIC      HC IMPLANT PROSTATE UROLIFT [facility] USE ONLY  3/17/2020         HX CATARACT REMOVAL Bilateral     HX HEENT Bilateral     eyelid surgery, corrective    HX PROSTATE SURGERY  3/17/2020    HX SHOULDER REPLACEMENT Right 02/2018    HX SHOULDER REPLACEMENT Left 01/07/2019    left shoulder replacement    .       MEDICATIONS      Current Outpatient Medications   Medication Sig Dispense Refill    predniSONE (STERAPRED DS) 10 mg dose pack take as directed      bisacodyL (DULCOLAX) 5 mg EC tablet Take 5 mg by mouth daily as needed for Constipation.  docusate sodium (COLACE) 100 mg capsule Take 100 mg by mouth three (3) times daily as needed for Constipation.  polyethylene glycol (MIRALAX) 17 gram/dose powder Take 17 g by mouth daily.  benazepriL (LOTENSIN) 10 mg tablet Take 10 mg by mouth daily.  saw pal-pumpkin [id number] 320-40-10-15 mg cap Take 1 Tab by mouth daily.  glucosamine HCl/S-Adenosylmet (TRIPLE FLEX MOOD & JOINT MIC-E PO) Take 1 Tab by mouth daily.  co-enzyme Q-10 (Co Q-10) 100 mg capsule Take 100 mg by mouth daily.  garlic (Garlique) 4,393 mcg tab Take 1 Tab by mouth daily.  C,E,zinc,copper 11-omega3s-lut (Ocuvite Adult 50 Plus) 250-5-1 mg cap Take 1 Cap by mouth daily.  acetaminophen (TYLENOL) 500 mg tablet Take 1,000 mg by mouth every six (6) hours as needed for Pain.  acetaminophen (Tylenol Arthritis Pain) 650 mg TbER Take 650 mg by mouth every eight (8) hours as needed for Pain.  diphenhydrAMINE-acetaminophen (Tylenol PM Extra Strength)  mg tab Take 2 Tabs by mouth nightly as needed for Pain.  aspirin delayed-release 81 mg tablet Take 81 mg by mouth daily. Indications: takes daily after dinner      psyllium (METAMUCIL) powd Take 2 Scoops by mouth two (2) times a day.  gabapentin (NEURONTIN) 300 mg capsule Take 300 mg by mouth two (2) times a day.  tamsulosin (FLOMAX) 0.4 mg capsule Take 0.4 mg by mouth daily (after dinner).  cyanocobalamin 1,000 mcg tablet Take 1,000 mcg by mouth daily.  brimonidine (ALPHAGAN P) 0.1 % ophthalmic solution Administer 1 Drop to both eyes two (2) times a day.  acetaminophen (TYLENOL) 325 mg tablet Take 2 Tabs by mouth every six (6) hours as needed for Pain. 60 Tab 0    CINNAMON BARK (CINNAMON PO) Take 1 Tab by mouth two (2) times a day.  CALCIUM PO Take 1 Tab by mouth daily.       lycopene 10 mg cap Take 1 Cap by mouth daily.  lutein 40 mg cap Take 1 Tab by mouth daily.  rosuvastatin (CRESTOR) 10 mg tablet Take 10 mg by mouth nightly.  cholecalciferol, vitamin d3, (VITAMIN D3) 1,000 unit tablet Take 1,000 Units by mouth daily.  MULTIVITAMIN WITH MINERALS PO Take 1 Tab by mouth daily. ALLERGIES  No Known Allergies       SOCIAL HISTORY    Social History     Socioeconomic History    Marital status: SINGLE     Spouse name: Not on file    Number of children: Not on file    Years of education: Not on file    Highest education level: Not on file   Occupational History    Not on file   Social Needs    Financial resource strain: Not on file    Food insecurity     Worry: Not on file     Inability: Not on file    Transportation needs     Medical: Not on file     Non-medical: Not on file   Tobacco Use    Smoking status: Never Smoker    Smokeless tobacco: Never Used   Substance and Sexual Activity    Alcohol use:  Yes     Alcohol/week: 1.0 standard drinks     Types: 1 Glasses of wine per week     Comment: social    Drug use: Never    Sexual activity: Not on file   Lifestyle    Physical activity     Days per week: Not on file     Minutes per session: Not on file    Stress: Not on file   Relationships    Social connections     Talks on phone: Not on file     Gets together: Not on file     Attends Baptism service: Not on file     Active member of club or organization: Not on file     Attends meetings of clubs or organizations: Not on file     Relationship status: Not on file    Intimate partner violence     Fear of current or ex partner: Not on file     Emotionally abused: Not on file     Physically abused: Not on file     Forced sexual activity: Not on file   Other Topics Concern    Not on file   Social History Narrative    Not on file     Socioeconomic History    Marital status: SINGLE     Spouse name: Not on file    Number of children: Not on file    Years of education: Not on file  Highest education level: Not on file   Occupational History    Not on file   Social Needs    Financial resource strain: Not on file    Food insecurity     Worry: Not on file     Inability: Not on file    Transportation needs     Medical: Not on file     Non-medical: Not on file   Tobacco Use    Smoking status: Never Smoker    Smokeless tobacco: Never Used   Substance and Sexual Activity    Alcohol use:  Yes     Alcohol/week: 1.0 standard drinks     Types: 1 Glasses of wine per week     Comment: social    Drug use: Never    Sexual activity: Not on file   Lifestyle    Physical activity     Days per week: Not on file     Minutes per session: Not on file    Stress: Not on file   Relationships    Social connections     Talks on phone: Not on file     Gets together: Not on file     Attends Gnosticism service: Not on file     Active member of club or organization: Not on file     Attends meetings of clubs or organizations: Not on file     Relationship status: Not on file    Intimate partner violence     Fear of current or ex partner: Not on file     Emotionally abused: Not on file     Physically abused: Not on file     Forced sexual activity: Not on file   Other Topics Concern    Not on file   Social History Narrative    Not on file      Problem Relation Age of Onset    Cancer Mother     Heart Disease Father          Any Jerez NP

## 2020-04-25 VITALS
DIASTOLIC BLOOD PRESSURE: 70 MMHG | HEART RATE: 65 BPM | SYSTOLIC BLOOD PRESSURE: 134 MMHG | TEMPERATURE: 98.1 F | OXYGEN SATURATION: 100 %

## 2020-04-25 VITALS
DIASTOLIC BLOOD PRESSURE: 80 MMHG | OXYGEN SATURATION: 99 % | TEMPERATURE: 97.8 F | SYSTOLIC BLOOD PRESSURE: 142 MMHG | RESPIRATION RATE: 18 BRPM | HEART RATE: 77 BPM

## 2020-05-20 ENCOUNTER — VIRTUAL VISIT (OUTPATIENT)
Dept: ORTHOPEDIC SURGERY | Age: 70
End: 2020-05-20

## 2020-05-20 DIAGNOSIS — G95.9 CERVICAL MYELOPATHY (HCC): Primary | ICD-10-CM

## 2020-05-20 DIAGNOSIS — Z98.1 S/P CERVICAL SPINAL FUSION: ICD-10-CM

## 2020-05-20 NOTE — PROGRESS NOTES
Demetrius Brooks is a 65 y.o. male who was seen by synchronous (real-time) audio-video technology on 5/20/2020. He has a history of neck pain. He had an ACDF C3/4 6 weeks ago. Prior to surgery, he had progressive symptoms such as poor balance and gait. at his last visit, he stated he could tell a big difference in his balance. He was feeling good from surgery. He had some intermittent right sided neck pain. He never took any pain medication. He was taking tylenol. Today, he states he still gets shaky when he stands up. Sometimes he feels as if he could fall forward. He still gets a pain on the right side of his, posteriorly. This is intermittent and improves with stretching. Denies bladder/bowel dysfunction, saddle paresthesia, weakness, gait disturbance, or other neurological deficit. Pt at this time desires to  continue with current care/proceed with medication evaluation/proceed with post op care. ASSESSMENT  65 y.o. male with neck pain. Diagnoses and all orders for this visit:    1. Cervical myelopathy ([facility] [address]    2. S/P cervical spinal fusion         IMPRESSION/PLAN    1) Pt was given information on neck exercises. 2) discussed his balance and gait. Hopeful it will continue to improve, but he understands he may be left with some balance/ gait issues but we are hopeful. He also has back pain. He wants to discuss lumbar sx with Dr. Rivka Pete. He has a secone opinion on Friday with a neurosurgeon. Discussed Dr. Rivka Pete would be happy to see him for a SC for his lumbar spine, closer to his 12 week post op adenike. He can call for this apt.   3) can call for PT, gait and balance training   4) Mr. Brian Alcala has a reminder for a \"due or due soon\" health maintenance. I have asked that he contact his primary care provider, Harish Pike MD, for follow-up on this health maintenance.   5) We have informed patient to notify us for immediate appointment if he has any worsening neurogical symptoms or if an emergency situation presents, then call 911  6) Pt will follow-up in 4 weeks for routine post op care or SC for lumbar spine. He will call to set this visit after his apt on Friday. Risks and benefits of ongoing  therapy have been reviewed with the patient. No pain behaviors. Denies thoughts of harming self or others. Pt has a good risk to benefit ratio which allows the pt to function in a home environment without side effects. Assessment & Plan:   Diagnoses and all orders for this visit:    1. Cervical myelopathy ([address]    2. S/P cervical spinal fusion              Subjective:   Keenan Seth was seen for No chief complaint on file. PAST MEDICAL HISTORY  Past Medical History:   Diagnosis Date    Benign prostatic hyperplasia, presence of lower urinary tract symptoms unspecified     BPH (benign prostatic hyperplasia)     HLD (hyperlipidemia)     Hypercholesterolemia     Hypertension     Ill-defined condition     Spine   ( back) problem. ..goes for Physical Therapy    Macular degeneration     NAION (non-arteritic anterior ischemic optic neuropathy)     Post-void dribbling     Pseudophakia         MEDICATIONS  Current Outpatient Medications   Medication Sig Dispense Refill    oxybutynin chloride XL (DITROPAN XL) 10 mg CR tablet Take 1 Tab by mouth daily. 90 Tab 1    bisacodyL (DULCOLAX) 5 mg EC tablet Take 5 mg by mouth daily as needed for Constipation.  docusate sodium (COLACE) 100 mg capsule Take 100 mg by mouth three (3) times daily as needed for Constipation.  polyethylene glycol (MIRALAX) 17 gram/dose powder Take 17 g by mouth daily.  benazepriL (LOTENSIN) 10 mg tablet Take 10 mg by mouth daily.  saw pal-pumpkin mg-yeie-Ja-B6 320-40-10-15 mg cap Take 1 Tab by mouth daily.  glucosamine HCl/S-Adenosylmet (TRIPLE FLEX MOOD & JOINT MIC-E PO) Take 1 Tab by mouth daily.  co-enzyme Q-10 (Co Q-10) 100 mg capsule Take 100 mg by mouth daily.       garlic (Garlique) 3,090 mcg tab Take 1 Tab by mouth daily.  C,E,zinc,copper 11-omega3s-lut (Ocuvite Adult 50 Plus) 250-5-1 mg cap Take 1 Cap by mouth daily.  acetaminophen (Tylenol Arthritis Pain) 650 mg TbER Take 650 mg by mouth every eight (8) hours as needed for Pain.  diphenhydrAMINE-acetaminophen (Tylenol PM Extra Strength)  mg tab Take 2 Tabs by mouth nightly as needed for Pain.  psyllium (METAMUCIL) powd Take 2 Scoops by mouth two (2) times a day.  gabapentin (NEURONTIN) 300 mg capsule Take 300 mg by mouth two (2) times a day.  cyanocobalamin 1,000 mcg tablet Take 1,000 mcg by mouth daily.  acetaminophen (TYLENOL) 325 mg tablet Take 2 Tabs by mouth every six (6) hours as needed for Pain. 60 Tab 0    CINNAMON BARK (CINNAMON PO) Take 1 Tab by mouth two (2) times a day.  CALCIUM PO Take 1 Tab by mouth daily.  lycopene 10 mg cap Take 1 Cap by mouth daily.  lutein 40 mg cap Take 1 Tab by mouth daily.  rosuvastatin (CRESTOR) 10 mg tablet Take 10 mg by mouth nightly.  cholecalciferol, vitamin d3, (VITAMIN D3) 1,000 unit tablet Take 1,000 Units by mouth daily.  MULTIVITAMIN WITH MINERALS PO Take 1 Tab by mouth daily.  predniSONE (STERAPRED DS) 10 mg dose pack take as directed      acetaminophen (TYLENOL) 500 mg tablet Take 1,000 mg by mouth every six (6) hours as needed for Pain.  aspirin delayed-release 81 mg tablet Take 81 mg by mouth daily. Indications: takes daily after dinner      brimonidine (ALPHAGAN P) 0.1 % ophthalmic solution Administer 1 Drop to both eyes two (2) times a day.          ALLERGIES  No Known Allergies    SOCIAL HISTORY    Social History     Socioeconomic History    Marital status: SINGLE     Spouse name: Not on file    Number of children: Not on file    Years of education: Not on file    Highest education level: Not on file   Occupational History    Not on file   Social Needs    Financial resource strain: Not on file   [facility]     Worry: Not on file     Inability: Not on file    Transportation needs     Medical: Not on file     Non-medical: Not on file   Tobacco Use    Smoking status: Never Smoker    Smokeless tobacco: Never Used   Substance and Sexual Activity    Alcohol use: Yes     Alcohol/week: 1.0 standard drinks     Types: 1 Glasses of wine per week     Comment: social    Drug use: Never    Sexual activity: Not on file   Lifestyle    Physical activity     Days per week: Not on file     Minutes per session: Not on file    Stress: Not on file   Relationships    Social connections     Talks on phone: Not on file     Gets together: Not on file     Attends Taoism service: Not on file     Active member of club or organization: Not on file     Attends meetings of clubs or organizations: Not on file     Relationship status: Not on file    Intimate partner violence     Fear of current or ex partner: Not on file     Emotionally abused: Not on file     Physically abused: Not on file     Forced sexual activity: Not on file   Other Topics Concern    Not on file   Social History Narrative    Not on file       Pain Scale: /10    Pain Assessment  4/24/2020   Location of Pain Neck   Location Modifiers Right   Severity of Pain 2   Quality of Pain Aching   Quality of Pain Comment -   Duration of Pain Persistent   Duration of Pain Comment -   Frequency of Pain Intermittent   Aggravating Factors Walking   Aggravating Factors Comment -   Limiting Behavior No   Relieving Factors NSAID   Relieving Factors Comment -   Result of Injury No   Work-Related Injury -   Type of Injury -         ROS      Objective: There were no vitals taken for this visit.      [INSTRUCTIONS:  \"[x]\" Indicates a positive item  \"[]\" Indicates a negative item  -- DELETE ALL ITEMS NOT EXAMINED]    Constitutional: [x] Appears well-developed and well-nourished [x] No apparent distress      [] Abnormal -     Mental status: [x] Alert and awake  [x] Oriented to person/place/time [x] Able to follow commands    [] Abnormal -     Eyes:   EOM    []  Normal    [] Abnormal -   Sclera  []  Normal    [] Abnormal -          Discharge [x]  None visible   [] Abnormal -     HENT: [] Normocephalic, atraumatic  [] Abnormal -     Neck: [] No visualized mass [] Abnormal -     Pulmonary/Chest: [] Respiratory effort normal   [] No visualized signs of difficulty breathing or respiratory distress        [] Abnormal -      Musculoskeletal:   [] Normal gait with no signs of ataxia         [] Normal range of motion of neck        [] Abnormal -        Neurological:        [] No Facial Asymmetry (Cranial nerve 7 motor function) (limited exam due to video visit)          [] No gaze palsy        [] Abnormal -          Skin:        [] No significant exanthematous lesions or discoloration noted on facial skin         [] Abnormal -                          Psychiatric:       [x] Normal Affect [] Abnormal -        [x] No Hallucinations        Due to this being a TeleHealth evaluation, many elements of the physical examination are unable to be assessed. We discussed the expected course, resolution and complications of the diagnosis(es) in detail. Medication risks, benefits, costs, interactions, and alternatives were discussed as indicated. I advised him to contact the office if his condition worsens, changes or fails to improve as anticipated. He expressed understanding with the diagnosis(es) and plan. Consent: Vince Dean, who was seen by synchronous (real-time) audio-video technology, and/or his healthcare decision maker, is aware that this patient-initiated, Telehealth encounter on 5/20/2020 is a billable service, with coverage as determined by his insurance carrier. He is aware that he may receive a bill and has provided verbal consent to proceed: Yes. Vince Dean is a 65 y.o. male who was evaluated by a video visit encounter for concerns as above.  Patient identification was verified prior to start of the visit. A caregiver was present when appropriate. Due to this being a TeleHealth encounter (During NAJJI-56 public health emergency), evaluation of the following organ systems was limited: Vitals/Constitutional/EENT/Resp/CV/GI//MS/Neuro/Skin/Heme-Lymph-Imm. Pursuant to the emergency declaration under the [address] [place] [address], [facility] waiver authority and the [employer] Act, this Virtual  Visit was conducted, with patient's consent, to reduce the patient's risk of exposure to COVID-19 and provide continuity of care for an established patient. Services were provided through real time synchronous discussion virtually to substitute for in-person clinic visit. Patient verbally consented to this type of visit and that they might get a bill from the billing of this visit. This service was provided through telephone,  the patient was located at home and  the provider was located at home. There was only the patient participating in the service. Start time 0815  End [id number].      Deisy Brooks NP

## 2020-05-20 NOTE — PATIENT INSTRUCTIONS
Neck Arthritis: Exercises Introduction Here are some examples of exercises for you to try. The exercises may be suggested for a condition or for rehabilitation. Start each exercise slowly. Ease off the exercises if you start to have pain. You will be told when to start these exercises and which ones will work best for you. How to do the exercises Neck stretches to the side 1. This stretch works best if you keep your shoulder down as you lean away from it. To help you remember to do this, start by relaxing your shoulders and lightly holding on to your thighs or your chair. 2. Tilt your head toward your shoulder and hold for 15 to 30 seconds. Let the weight of your head stretch your muscles. 3. Repeat 2 to 4 times toward each shoulder. Chin tuck 1. Lie on the floor with a rolled-up towel under your neck. Your head should be touching the floor. 2. Slowly bring your chin toward your chest. 
3. Hold for a count of 6, and then relax for up to 10 seconds. 4. Repeat 8 to 12 times. Active cervical rotation 1. Sit in a firm chair, or stand up straight. 2. Keeping your chin level, turn your head to the right, and hold for 15 to 30 seconds. 3. Turn your head to the left and hold for 15 to 30 seconds. 4. Repeat 2 to 4 times to each side. Shoulder blade squeeze 1. While standing, squeeze your shoulder blades together. 2. Do not raise your shoulders up as you are squeezing. 3. Hold for 6 seconds. 4. Repeat 8 to 12 times. Shoulder rolls 1. Sit comfortably with your feet shoulder-width apart. You can also do this exercise standing up. 2. Roll your shoulders up, then back, and then down in a smooth, circular motion. 3. Repeat 2 to 4 times. Follow-up care is a key part of your treatment and safety. Be sure to make and go to all appointments, and call your doctor if you are having problems. It's also a good idea to know your test results and keep a list of the medicines you take. Where can you learn more? Go to http://reji-sigifredo.info/ Enter G003 in the search box to learn more about \"Neck Arthritis: Exercises. \" Current as of: June 26, 2019Content Version: 12.4 © 0968-1656 [employer]. Care instructions adapted under license by [employer] (which disclaims liability or warranty for this information). If you have questions about a medical condition or this instruction, always ask your healthcare professional. [place] [address] any warranty or liability for your use of this information.

## 2024-02-21 NOTE — ROUTINE PROCESS
Bedside and Verbal shift change report given to [facility] LPN (oncoming nurse) by Sandra Rubin RN (offgoing nurse). Report included the following information SBAR, Kardex, MAR and Recent Results.     SITUATION:    Code Status: Full Code   Reason for Admission: Osteoarthritis of right shoulder, unspecified osteoarthritis type [facility] day: 1   Problem List:       Hospital Problems  Date Reviewed: 2/8/2018          Codes Class Noted POA    Osteoarthritis of right shoulder ICD-10-CM: M19.011  ICD-9-CM: 715.91  2/8/2018 Unknown              BACKGROUND:    Past Medical History:   Past Medical History:   Diagnosis Date    Hypercholesterolemia     Hypertension          Patient taking anticoagulants no     ASSESSMENT:    Changes in Assessment Throughout Shift: No     Patient has Central Line: no Reasons if yes: No   Patient has Santiago Cath: no Reasons if yes: No      Last Vitals:     Vitals:    02/08/18 1202 02/08/18 1438 02/08/18 2201 02/09/18 0500   BP:  108/75 128/80 128/78   Pulse: 89 92 94 88   Resp: 18 16 18 18   Temp: 98.1 °F (36.7 °C)  96 °F (35.6 °C) 97.5 °F (36.4 °C)   SpO2: 96% 95% 96% 98%   Weight:       Height:            IV and DRAINS (will only show if present)   Peripheral IV 02/08/18 Left Hand-Site Assessment: Clean, dry, & intact     WOUND (if present)   Wound Type:  none   Dressing present Dressing Present : Yes   Wound Concerns/Notes:  none     PAIN    Pain Assessment    Pain Intensity 1: 10 (02/09/18 0517)    Pain Location 1: Shoulder    Pain Intervention(s) 1: Medication (see MAR)    Patient Stated Pain Goal: 0  o Interventions for Pain:  none  o Intervention effective: no  o Time of last intervention: 0700   o Reassessment Completed: no      Last 3 Weights:  Last 3 Recorded Weights in this Encounter    01/26/18 0835 02/08/18 0557   Weight: 83.9 kg (185 lb) 81.8 kg (180 lb 4 oz)     Weight change:      INTAKE/OUPUT    Current Shift:      Last three shifts: 02/07 1901 - 02/09 0700  In: 3000 [I.V.:3000]  Out: 750 [Urine:750]     LAB RESULTS     Recent Labs      02/09/18   0408   HGB  10.8*   HCT  32.8*      No results for input(s): NA, K, GLU, BUN, CREA, CA, MG, INR in the last 72 hours. No lab exists for component: PT, PTT, INREXT    RECOMMENDATIONS AND DISCHARGE PLANNING     1. Pending tests/procedures/ Plan of Care or Other Needs: TBD     2. Discharge plan for patient and Needs/Barriers: TBD    3. Estimated Discharge Date: TBD Posted on Whiteboard in Patients Room: no      4. The patient's care plan was reviewed with the oncoming nurse. \"HEALS\" SAFETY CHECK      Fall Risk    Total Score: 1    Safety Measures:      A safety check occurred in the patient's room between off going nurse and oncoming nurse listed above. The safety check included the below items  Area Items   H  High Alert Medications - Verify all high alert medication drips (heparin, PCA, etc.)   E  Equipment - Suction is set up for ALL patients (with fernando)  - Red plugs utilized for all equipment (IV pumps, etc.)  - WOWs wiped down at end of shift.  - Room stocked with oxygen, suction, and other unit-specific supplies   A  Alarms - Bed alarm is set for fall risk patients  - Ensure chair alarm is in place and activated if patient is up in a chair   L  Lines - Check IV for any infiltration  - Santiago bag is empty if patient has a Santiago   - Tubing and IV bags are labeled   S  Safety   - Room is clean, patient is clean, and equipment is clean. - Hallways are clear from equipment besides carts. - Fall bracelet on for fall risk patients  - Ensure room is clear and free of clutter  - Suction is set up for ALL patients (with fernando)  - Hallways are clear from equipment besides carts.    - Isolation precautions followed, supplies available outside room, sign posted     Adrienne Vance RN [[email or web address]] : Annual  [de-identified] : 77 year old female with pmh of HTN presenting for an annual physical exam. Patient reports she is doing well but her  isn't. He has meningioma and had recurrent surgery now at [facility]. She is supportive friends. Reports her blood pressure at home has been ranging from 120-130/68-74. She takes advil daily for knee arthritic pain.

## (undated) DEVICE — PREP SKN CHLRAPRP 26ML TNT -- CONVERT TO ITEM 373320

## (undated) DEVICE — SUTURE PERMA-HAND SZ 2-0 L24IN NONABSORBABLE BLK W/O NDL SA75H

## (undated) DEVICE — (D)PREP SKN CHLRAPRP APPL 26ML -- CONVERT TO ITEM 371833

## (undated) DEVICE — GUIDEPIN ORTH L190MM DIA2.5MM METAGLENE CTRL FOR DELT XTEND

## (undated) DEVICE — Z DISCONTINUED BY MEDLINE USE 2711682 TRAY SKIN PREP DRY W/ PREM GLV

## (undated) DEVICE — PREP CHLORAPREP 10.5 ML ORG --

## (undated) DEVICE — REAMER SURG SZ 40/44MM CTRL LO PROF GLOB APG+

## (undated) DEVICE — NEEDLE SUT SZ 2 S STL MAYO CATGUT 1/2 CIR TAPR PT

## (undated) DEVICE — REM POLYHESIVE ADULT PATIENT RETURN ELECTRODE: Brand: VALLEYLAB

## (undated) DEVICE — INSULATED BLADE ELECTRODE: Brand: EDGE

## (undated) DEVICE — Z DISCONTINUED USE 2150869 IMMOBILIZER SHLDR L BASIC SUP ABD SLNG

## (undated) DEVICE — HANDPIECE SET WITH HIGH FLOW TIP AND SUCTION TUBE: Brand: INTERPULSE

## (undated) DEVICE — DRAPE,UNDERBUTTOCKS,PCH,STERILE: Brand: MEDLINE

## (undated) DEVICE — MIXER BNE CEM MINI SMARTMIX

## (undated) DEVICE — STERILE POLYISOPRENE POWDER-FREE SURGICAL GLOVES: Brand: PROTEXIS

## (undated) DEVICE — COVER LT HNDL BLU STRL -- MEDICHOICE

## (undated) DEVICE — SSC BONE WAX: Brand: SSC BONE WAX

## (undated) DEVICE — BIT DRL SZ 40/44 CTRL QUIK CONN GLOB APG+

## (undated) DEVICE — KIT CLN UP BON SECOURS MARYV

## (undated) DEVICE — NEEDLE NRV BLK 21GA L4IN 30DEG INSUL BVL EXTN SET STIMUPLEX

## (undated) DEVICE — SUTURE FIBERWIRE SZ 2 W/ TAPERED NEEDLE BLUE L38IN NONABSORB BLU L26.5MM 1/2 CIRCLE AR7200

## (undated) DEVICE — 4-PORT MANIFOLD: Brand: NEPTUNE 2

## (undated) DEVICE — SUTURE PDS II SZ 1 L36IN ABSRB VLT L36MM CT-1 1/2 CIR Z347H

## (undated) DEVICE — INTENDED FOR TISSUE SEPARATION, AND OTHER PROCEDURES THAT REQUIRE A SHARP SURGICAL BLADE TO PUNCTURE OR CUT.: Brand: BARD-PARKER SAFETY BLADES SIZE 10, STERILE

## (undated) DEVICE — PAD COOL W10.9XL11.3IN UNIV REG HOSE WRP ON THER CLD

## (undated) DEVICE — SUTURE VCRL SZ 3-0 L27IN ABSRB UD L26MM SH 1/2 CIR J416H

## (undated) DEVICE — SHOULDER STABILIZATION KIT,                                    DISPOSABLE 12 PER BOX

## (undated) DEVICE — SOLUTION IV 1000ML 0.9% SOD CHL

## (undated) DEVICE — KENDALL SCD EXPRESS SLEEVES, KNEE LENGTH, MEDIUM: Brand: KENDALL SCD

## (undated) DEVICE — BANDAGE COMPR SELF ADH 5 YDX6 IN TAN STRL PREMIERPRO LF

## (undated) DEVICE — OPTIFOAM GENTLE SA, POSTOP, 4X8: Brand: MEDLINE

## (undated) DEVICE — SOLUTION SCRB 4OZ 4% CHG CLN BASE FOR PT SKIN ANTISEPSIS

## (undated) DEVICE — WATERPROOF, BACTERIA PROOF DRESSING WITH ABSORBENT SEE THROUGH PAD: Brand: OPSITE POST-OP VISIBLE 20X10CM CTN 20

## (undated) DEVICE — T-MAX DISPOSABLE FACE MASK 8 PER BOX

## (undated) DEVICE — SLIM BODY SKIN STAPLER: Brand: APPOSE ULC

## (undated) DEVICE — BULB SYRINGE, IRRIGATION WITH PROTECTIVE CAP, 60 CC, INDIVIDUALLY WRAPPED: Brand: DOVER

## (undated) DEVICE — 2108 SERIES SAGITTAL BLADE (24.7 X 0.89 X 73.7MM)

## (undated) DEVICE — INTENDED FOR TISSUE SEPARATION, AND OTHER PROCEDURES THAT REQUIRE A SHARP SURGICAL BLADE TO PUNCTURE OR CUT.: Brand: BARD-PARKER SAFETY BLADES SIZE 15, STERILE

## (undated) DEVICE — SUTURE VCRL SZ 2-0 L36IN ABSRB UD L36MM CT-1 1/2 CIR J945H

## (undated) DEVICE — GEL BAG FOR WRAPS --

## (undated) DEVICE — 3M™ TEGADERM™ TRANSPARENT FILM DRESSING FRAME STYLE, 1626W, 4 IN X 4-3/4 IN (10 CM X 12 CM), 50/CT 4CT/CASE: Brand: 3M™ TEGADERM™

## (undated) DEVICE — COLLAR CERV L H3.25X23IN M DENS FOAM COT STOCK CVR LO

## (undated) DEVICE — SHEET,DRAPE,70X100,STERILE: Brand: MEDLINE

## (undated) DEVICE — NEEDLE SPNL 22GA L3.5IN BLK HUB S STL REG WALL FIT STYL W/

## (undated) DEVICE — SOLUTION IRRIG 3000ML 0.9% SOD CHL FLX CONT 0797208] ICU MEDICAL INC]

## (undated) DEVICE — SUTURE MCRYL SZ 2-0 L36IN ABSRB UD L36MM CT-1 1/2 CIR Y945H

## (undated) DEVICE — SUTURE VCRL SZ 0 L36IN ABSRB UD L36MM CT-1 1/2 CIR J946H

## (undated) DEVICE — SUT ETHBND 2 30IN OS4 GRN --

## (undated) DEVICE — HEWSON SUTURE RETRIEVER: Brand: HEWSON SUTURE RETRIEVER

## (undated) DEVICE — Device

## (undated) DEVICE — STOCKING ANTIEMB KNEE LG REG --

## (undated) DEVICE — SOFT SILICONE HYDROCELLULAR SACRUM DRESSING WITH LOCK AWAY LAYER: Brand: ALLEVYN LIFE SACRUM (LARGE) PACK OF 10

## (undated) DEVICE — 3M™ STERI-DRAPE™ U-DRAPE 1015: Brand: STERI-DRAPE™

## (undated) DEVICE — HEX-LOCKING BLADE ELECTRODE: Brand: EDGE

## (undated) DEVICE — PACKING 8004000 NEURAY 200PK 13X13MM: Brand: NEURAY ®

## (undated) DEVICE — (D)ADHESIVE TISS HI VISC 1ML -- DISC USE ITEM 346585

## (undated) DEVICE — DRAIN KT WND 10FR RND 400ML --

## (undated) DEVICE — GAUZE SPONGES,USP TYPE VII GAUZE, 12 PLY: Brand: CURITY

## (undated) DEVICE — (D)DRSG BORD MPLX SACRUM 23X23 -- DISC BY MFR USE ITEM 340908

## (undated) DEVICE — GARMENT,MEDLINE,DVT,INT,CALF,MED, GEN2: Brand: MEDLINE

## (undated) DEVICE — NEEDLE SUT 1/2 CIR TAPR TIP MAYO NONSTERILE SZ 5

## (undated) DEVICE — (D)STRIP SKN CLSR 0.5X4IN WHT --

## (undated) DEVICE — SCREW EXT FIX L14MM FOR DISTRCTN

## (undated) DEVICE — GOWN,REINFORCED,POLY,AURORA,XLARGE,STRL: Brand: MEDLINE

## (undated) DEVICE — T5 HOOD WITH PEEL AWAY FACE SHIELD

## (undated) DEVICE — PACK PROCEDURE SURG TOT HIP BSHR LF

## (undated) DEVICE — 3.0MM PRECISION NEURO (MATCH HEAD)

## (undated) DEVICE — FLEX ADVANTAGE 3000CC: Brand: FLEX ADVANTAGE

## (undated) DEVICE — GAUZE SPONGES,16 PLY: Brand: CURITY

## (undated) DEVICE — 1010 S-DRAPE TOWEL DRAPE 10/BX: Brand: STERI-DRAPE™

## (undated) DEVICE — DRAIN SURG W7MMXL20CM SIL FULL PERF HUBLESS FLAT RADPQ STRP

## (undated) DEVICE — APPLIER CLP L9.38IN M LIG TI DISP STR RNG HNDL LIGACLP

## (undated) DEVICE — OCCLUSIVE GAUZE STRIP,3% BISMUTH TRIBROMOPHENATE IN PETROLATUM BLEND: Brand: XEROFORM

## (undated) DEVICE — SUTURE VCRL SZ 2 L27IN ABSRB VLT L65MM TP-1 1/2 CIR J649G

## (undated) DEVICE — APPLICATOR BNDG 1MM ADH PREMIERPRO EXOFIN

## (undated) DEVICE — 3M™ DURAPORE™ SURGICAL TAPE 2 INCHES X 10YARDS (5.0CM X 9.1M) 6ROLLS/CARTON 10CARTONS/CASE 1538-2: Brand: 3M™ DURAPORE™

## (undated) DEVICE — SPONGE LAP 18X18IN STRL -- 5/PK

## (undated) DEVICE — 3M™ STERI-STRIP™ COMPOUND BENZOIN TINCTURE 40 BAGS/CARTON 4 CARTONS/CASE C1544: Brand: 3M™ STERI-STRIP™

## (undated) DEVICE — SPONGE DISSECT PNUT SM 3/8IN -- 5/PK

## (undated) DEVICE — FABRIC REINFORCED, SURGICAL GOWN, LG: Brand: CONVERTORS

## (undated) DEVICE — 3M™ BAIR PAWS FLEX™ WARMING GOWN, STANDARD, 20 PER CASE 81003: Brand: BAIR PAWS™

## (undated) DEVICE — WRAP CLD THER COMPR UNIV BLK SHLDR FOAM REUSE PREM

## (undated) DEVICE — 10FR FRAZIER SUCTION HANDLE: Brand: CARDINAL HEALTH

## (undated) DEVICE — 3M™ TEGADERM™ TRANSPARENT FILM DRESSING FRAME STYLE, 1627, 4 IN X 10 IN (10 CM X 25 CM), 20/CT 4CT/CASE: Brand: 3M™ TEGADERM™

## (undated) DEVICE — SYRINGE MED 3ML NDL 22GA L1 1/2IN REG BVL SFGLDE

## (undated) DEVICE — SUTURE MCRYL SZ 3-0 L27IN ABSRB UD L24MM PS-1 3/8 CIR PRIM Y936H